# Patient Record
Sex: FEMALE | Race: WHITE | NOT HISPANIC OR LATINO | Employment: UNEMPLOYED | URBAN - METROPOLITAN AREA
[De-identification: names, ages, dates, MRNs, and addresses within clinical notes are randomized per-mention and may not be internally consistent; named-entity substitution may affect disease eponyms.]

---

## 2017-01-26 ENCOUNTER — ALLSCRIPTS OFFICE VISIT (OUTPATIENT)
Dept: OTHER | Facility: OTHER | Age: 52
End: 2017-01-26

## 2017-03-01 ENCOUNTER — GENERIC CONVERSION - ENCOUNTER (OUTPATIENT)
Dept: OTHER | Facility: OTHER | Age: 52
End: 2017-03-01

## 2017-04-20 ENCOUNTER — ALLSCRIPTS OFFICE VISIT (OUTPATIENT)
Dept: OTHER | Facility: OTHER | Age: 52
End: 2017-04-20

## 2017-08-31 ENCOUNTER — ALLSCRIPTS OFFICE VISIT (OUTPATIENT)
Dept: OTHER | Facility: OTHER | Age: 52
End: 2017-08-31

## 2018-01-04 ENCOUNTER — ALLSCRIPTS OFFICE VISIT (OUTPATIENT)
Dept: OTHER | Facility: OTHER | Age: 53
End: 2018-01-04

## 2018-01-09 NOTE — MISCELLANEOUS
Message   Recorded as Task   Date: 03/01/2017 10:55 AM, Created By: Carter Lawson   Task Name: Call Back   Assigned To: Clementine Campuzano   Regarding Patient: Gay Munoz, Status: Active   CommentSerge Candy - 01 Mar 2017 10:55 AM     TASK CREATED  Caller: Self; [phone] (Home)  pls call about restless leg syndrome she changed all her meds to am   Spoke with pt and complains of restless legs despite abilify in am  Will add requip 0 25 mgpo qhs        Plan  Bipolar 2 disorder    · ROPINIRole HCl - 0 25 MG Oral Tablet (Requip); 1 po qhs    Signatures   Electronically signed by : RAUDEL Bowman; Mar  1 2017  5:08PM EST                       (Author)

## 2018-01-11 NOTE — PSYCH
Psych Med Mgmt    Appearance: was calm and cooperative  Observed mood: anxious  Observed mood: affect appropriate  Speech: a normal rate  Thought processes: coherent/organized  Hallucinations: no hallucinations present  Thought Content: no delusions  Abnormal Thoughts: The patient has no suicidal thoughts and no homicidal thoughts  Orientation: The patient is oriented to person, place and time  Recent and Remote Memory: short term memory intact and long term memory intact  Attention Span And Concentration: concentration intact  Insight: Insight intact  Judgment: Her judgment was intact  Muscle Strength And Tone  slow andstaedy gait  Individual Psychotherapy minutes provided today  Goals addressed in session: Discussed residual grief with her step daughter and cocnerns with spouse       Treatment Recommendations: Cymbalta 60 mg po qd  Wellbutrin  mgpo qd  Lunesta 3 mg po qhs  Ativan 1 mg po qid prn  Risks, Benefits And Possible Side Effects Of Medications: Risks, benefits, and possible side effects of medications explained to patient and patient verbalizes understanding  Discussed with patient the risks of sedation, respiratory depression, impairment of ability to drive and potential for abuse and addiction related to treatment with benzodiazepine medications  The patient understands risk of treatment with benzodiazepine medications, agrees to not drive if feels impaired and agrees to take medications as prescribed  She reports normal appetite, normal energy level, no weight change and normal number of sleep hours  Pt seen for follow up Bipolar Disorder  Pt had back surgery fusion November 30, 2016 and states she had "horrible" experience  SHe did not get her depression or anxiety med when she was in [facility]  She went through withdrawal and was physically sick vomiting, etc  States when she got home she restarted her meds   She is off pain meds now  She appears in good spirits  She is back working and will be going back full time next week  She also states the holidays were good and family all go along  She is sleeping fairly well but has noted some restless legs  Question if due to abilify- will change time morning  Her husbands daughter unfortunately committed suicide by overdose November 19  He is struggling with that  Discussed grief counseling and support groups  DSM    Provisional Diagnosis: Bipolar Disorder 2  Assessment    1  Bipolar 2 disorder (974 03) (F31 81)    Plan    1  Eszopiclone 3 MG Oral Tablet; 1 po qhs prn   2  LORazepam 1 MG Oral Tablet; 1 tab po qid prn    Review of Systems    Constitutional: No fever, no chills, feels well, no tiredness, no recent weight gain or loss  Cardiovascular: no complaints of slow or fast heart rate, no chest pain, no palpitations  Respiratory: no complaints of shortness of breath, no wheezing, no dyspnea on exertion  Gastrointestinal: no complaints of abdominal pain, no constipation, no nausea, no diarrhea, no vomiting  Genitourinary: no complaints of dysuria, no incontinence, no pelvic pain, no urinary frequency  Musculoskeletal: back pain  Integumentary: no complaints of skin rash, no itching, no dry skin  Neurological: no complaints of headache, no confusion, no numbness, no dizziness  Active Problems    1  Bipolar 2 disorder (107 89) (F31 81)    Allergies    1  Erythromycin Derivatives    Current Meds   1  ARIPiprazole 5 MG Oral Tablet; 1 TAB PO QD; Therapy: 58Kjb2033 to (Last Rx:07Dec2016)  Requested for: 03GQW7366 Ordered   2  BuPROPion HCl ER (XL) 150 MG Oral Tablet Extended Release 24 Hour; 1 tab po q am;   Therapy: 00Hln7466 to (Last Rx:07Dec2016)  Requested for: 80PTP6148 Ordered   3  DULoxetine HCl - 60 MG Oral Capsule Delayed Release Particles; 1 TAB PO QD; Therapy: 57Toq5550 to (Last Rx:07Dec2016)  Requested for: 20CHX3639 Ordered   4   Eszopiclone 3 MG Oral Tablet; 1 po qhs prn; Therapy: 91Ldj4774 to (Last Rx:07Dec2016) Ordered   5  LORazepam 1 MG Oral Tablet; 1 tab po qid prn; Therapy: 50Bqq3186 to (Last Rx:07Dec2016) Ordered    The medication list was reviewed and updated today  Family Psych History  Mother    1  Family history of depression (V17 0) (Z81 8)    The family history was reviewed and updated today  Social History    · Current every day smoker (305 1) (F17 200)   · Light cigarette smoker (1-9 cigarettes per day) (305 1) (F17 210)   ·    · No alcohol use  The social history was reviewed and updated today  The social history was reviewed and is unchanged  End of Encounter Meds    1  ARIPiprazole 5 MG Oral Tablet (Abilify); 1 TAB PO QD; Therapy: 13Bql4506 to (Last Rx:07Dec2016)  Requested for: 95XMO4899 Ordered   2  BuPROPion HCl ER (XL) 150 MG Oral Tablet Extended Release 24 Hour; 1 tab po q am;   Therapy: 45Soj9480 to (Last Rx:07Dec2016)  Requested for: 39TMA6263 Ordered   3  DULoxetine HCl - 60 MG Oral Capsule Delayed Release Particles; 1 TAB PO QD; Therapy: 72Rzw6039 to (Last Rx:07Dec2016)  Requested for: 84XJM5246 Ordered   4  Eszopiclone 3 MG Oral Tablet; 1 po qhs prn; Therapy: 01Rmn3295 to (Last Rx:26Jan2017) Ordered   5  LORazepam 1 MG Oral Tablet; 1 tab po qid prn;    Therapy: 80Vlm6500 to (Last Rx:26Jan2017) Ordered    Signatures   Electronically signed by : Ceferino Gonzalez, [facility]; Jan 26 2017 10:29AM EST                       (Author)    Electronically signed by : Joseph Shannon MD; Jan 30 2017  4:44PM EST

## 2018-01-11 NOTE — PSYCH
Psych Med Mgmt    Appearance: was calm and cooperative, adequate hygiene and grooming and good eye contact  Observed mood: was dysphoric, but mood appropriate  Observed mood: affect appropriate  Speech: a normal rate  Thought processes: coherent/organized  Hallucinations: no hallucinations present  Thought Content: no delusions  Abnormal Thoughts: The patient has no suicidal thoughts and no homicidal thoughts  Orientation: The patient is oriented to person, place and time  Recent and Remote Memory: short term memory intact and long term memory intact  Attention Span And Concentration: concentration intact  Insight: Insight intact  Judgment: Her judgment was intact  Muscle Strength And Tone  Normal gait and station  Treatment Recommendations: Equip 0 5 mg po qhs  Abilify 5 mg po qd  Wellbutrin  mg po qam  Ativan 1 mg po qid prn  Lunesta 3 mg po qhs prn  Risks, Benefits And Possible Side Effects Of Medications: Risks, benefits, and possible side effects of medications explained to patient and patient verbalizes understanding  She reports normal appetite, normal energy level, no weight change and normal number of sleep hours  Pt seen for follow up Bipolar Disorder  Pt overall has been stable  SHe states her moods are okay  She has increased stressors at work due to a new boss/ new  and states he has been very overbearing  She is looking fro other employment although she has been there for 26 years  She also has stressors at home because her daughter and son-in-law are living there as well as her step-daughter  She has been doing much better with requip and restless legs are stable  She is overall feeling as though she is handling htings  DSM    Provisional Diagnosis: Bipolar Disorder II  Assessment    1  Bipolar 2 disorder (674 97) (F35 30)    Plan    1  ARIPiprazole 5 MG Oral Tablet (Abilify); take 1 tablet by mouth once daily   2   BuPROPion HCl ER (XL) 150 MG Oral Tablet Extended Release 24 Hour; take 1   tablet by mouth every morning   3  DULoxetine HCl - 60 MG Oral Capsule Delayed Release Particles; take 1 tablet   by mouth once daily   4  Eszopiclone 3 MG Oral Tablet; 1 po qhs prn   5  LORazepam 1 MG Oral Tablet; 1 tab po qid prn   6  ROPINIRole HCl - 0 5 MG Oral Tablet; Take 1 tablet by mouth at bedtime    Review of Systems    Constitutional: No fever, no chills, feels well, no tiredness, no recent weight gain or loss  Cardiovascular: no complaints of slow or fast heart rate, no chest pain, no palpitations  Respiratory: no complaints of shortness of breath, no wheezing, no dyspnea on exertion  Gastrointestinal: no complaints of abdominal pain, no constipation, no nausea, no diarrhea, no vomiting  Genitourinary: no complaints of dysuria, no incontinence, no pelvic pain, no urinary frequency  Musculoskeletal: no complaints of arthralgia, no myalgias, no limb pain, no joint stiffness  Integumentary: no complaints of skin rash, no itching, no dry skin  Active Problems    1  Bipolar 2 disorder (296 89) (F31 81)    Allergies    1  Erythromycin Derivatives    Current Meds   1  ARIPiprazole 5 MG Oral Tablet; take 1 tablet by mouth once daily; Therapy: 05Oep9547 to (Evaluate:10Sep2017)  Requested for: 09Brp3871; Last   Rx:12Jul2017 Ordered   2  BuPROPion HCl ER (XL) 150 MG Oral Tablet Extended Release 24 Hour; take 1 tablet by   mouth every morning; Therapy: 17Rox1421 to (Evaluate:08Sep2017)  Requested for: 74Cry2771; Last   Rx:78Sub3376 Ordered   3  DULoxetine HCl - 60 MG Oral Capsule Delayed Release Particles; take 1 tablet by mouth   once daily; Therapy: 89Cwc1912 to (Evaluate:10Sep2017)  Requested for: 06Kmn2850; Last   Rx:34Xcl2019 Ordered   4  Eszopiclone 3 MG Oral Tablet; 1 po qhs prn; Therapy: 75Tvy9038 to (Last Rx:20Apr2017) Ordered   5  LORazepam 1 MG Oral Tablet; 1 tab po qid prn;    Therapy: 28Ifh7961 to (Last Rx:20Apr2017) Ordered   6  ROPINIRole HCl - 0 5 MG Oral Tablet; Take 1 tablet by mouth at bedtime; Therapy: 42KNB9059 to (Evaluate:24Hox4567)  Requested for: 11Aug2017; Last   Rx:11Aug2017 Ordered    The medication list was reviewed and updated today  Family Psych History  Mother    1  Family history of depression (V17 0) (Z81 8)    Social History    · Current every day smoker (305 1) (F17 200)   · Light cigarette smoker (1-9 cigarettes per day) (305 1) (F17 210)   ·    · No alcohol use  The social history was reviewed and is unchanged  End of Encounter Meds    1  ARIPiprazole 5 MG Oral Tablet (Abilify); take 1 tablet by mouth once daily; Therapy: 77Rcu8756 to (Evaluate:94Zji8616)  Requested for: 34Ljn4604; Last   Rx:69Esc5694 Ordered   2  BuPROPion HCl ER (XL) 150 MG Oral Tablet Extended Release 24 Hour; take 1 tablet by   mouth every morning; Therapy: 60Ock2338 to (Evaluate:65Fnd1248)  Requested for: 70Ltl3546; Last   Rx:45Wqp8464 Ordered   3  DULoxetine HCl - 60 MG Oral Capsule Delayed Release Particles; take 1 tablet by mouth   once daily; Therapy: 73Sse8436 to (Evaluate:62Xty1440)  Requested for: 57Pfe3835; Last   Rx:29Jws3301 Ordered   4  Eszopiclone 3 MG Oral Tablet; 1 po qhs prn; Therapy: 85Uyo0893 to (Last Rx:45Uud1260) Ordered   5  LORazepam 1 MG Oral Tablet; 1 tab po qid prn; Therapy: 08Evh1077 to (Last Rx:40Iww8723) Ordered   6  ROPINIRole HCl - 0 5 MG Oral Tablet; Take 1 tablet by mouth at bedtime; Therapy: 44UUU8213 to (Evaluate:71Bhk7581)  Requested for: 96Wit5417; Last   Rx:04Wcp1419 Ordered    Signatures   Electronically signed by : Lura Holstein, [facility];  Aug 31 2017  4:03PM EST                       (Author)    Electronically signed by : Shanelle Dallas MD; Aug 31 2017  4:54PM EST

## 2018-01-12 NOTE — PSYCH
Psych Med Mgmt    Appearance: was calm and cooperative and good eye contact  Observed mood: mood appropriate  Observed mood: affect appropriate  Speech: a normal rate  Thought processes: coherent/organized  Hallucinations: no hallucinations present  Thought Content: no delusions  Abnormal Thoughts: The patient has no suicidal thoughts and no homicidal thoughts  Orientation: The patient is oriented to person, place and time  Recent and Remote Memory: short term memory intact and long term memory intact  Attention Span And Concentration: concentration intact  Insight: Insight intact  Judgment: Her judgment was intact  Muscle Strength And Tone  Normal gait and station  Treatment Recommendations: Increase Requip 0 5 mg po qhs  Ativan 1 mg po qid prn  Cymbalta 60 mg po qd   Wellbutrin  mg po qam   Abilify 5 mg po qam  Lunesta 3 mg po qhs prn  Risks, Benefits And Possible Side Effects Of Medications: Risks, benefits, and possible side effects of medications explained to patient and patient verbalizes understanding  Discussed with patient the risks of sedation, respiratory depression, impairment of ability to drive and potential for abuse and addiction related to treatment with benzodiazepine medications  The patient understands risk of treatment with benzodiazepine medications, agrees to not drive if feels impaired and agrees to take medications as prescribed  She reports normal appetite, normal energy level, no weight change and decrease in number of sleep hours   Pt seen for Bipolar Disorder  Pt states she has not been sleeping well and awakens often during the night  The Lunesta causes her to fall asleep but awakens after 2 hours and then repetitively during the night  She has a good appetite  Her back has been much better since the surgery in November  She reports the restless leg is better with the requip   SHe is not feeling depressed or anxious- meds are helping mood  No panic attacks or crying episodes  No side effects with meds and taking abilify in the morning  States her spouse is doing "okay" and he is seeing someone after his daughters suicide  She states her daughter visited with the 2 grandchildren  She continues with stressors/ difficulties with daughter but enjoyed seeing her grandchildren  Vitals  Signs   Recorded: 20Apr2017 09:12AM   Heart Rate: 71  Systolic: 109  Diastolic: 73  Recorded: 39WWU5543 09:05AM   Respiration: 16  Height: 5 ft 7 in  Weight: 180 lb   BMI Calculated: 28 19  BSA Calculated: 1 93    DSM    Provisional Diagnosis: Bipolar Disorder II  Assessment    1  Bipolar 2 disorder (625 89) (F31 81)    Plan    1  ARIPiprazole 5 MG Oral Tablet (Abilify); 1 TAB PO QD   2  BuPROPion HCl ER (XL) 150 MG Oral Tablet Extended Release 24 Hour; 1 tab   po q am   3  DULoxetine HCl - 60 MG Oral Capsule Delayed Release Particles; 1 TAB PO QD   4  Eszopiclone 3 MG Oral Tablet; 1 po qhs prn   5  LORazepam 1 MG Oral Tablet; 1 tab po qid prn   6  ROPINIRole HCl - 0 5 MG Oral Tablet; 1 po qhs    Review of Systems    Constitutional: feeling tired  Cardiovascular: no complaints of slow or fast heart rate, no chest pain, no palpitations  Respiratory: no complaints of shortness of breath, no wheezing, no dyspnea on exertion  Gastrointestinal: no complaints of abdominal pain, no constipation, no nausea, no diarrhea, no vomiting  Genitourinary: no complaints of dysuria, no incontinence, no pelvic pain, no urinary frequency  Musculoskeletal: no complaints of arthralgia, no myalgias, no limb pain, no joint stiffness  Integumentary: no complaints of skin rash, no itching, no dry skin  Neurological: no complaints of headache, no confusion, no numbness, no dizziness  Active Problems    1  Bipolar 2 disorder (327 89) (F31 81)    Allergies    1  Erythromycin Derivatives    Current Meds   1   ARIPiprazole 5 MG Oral Tablet; 1 TAB PO QD;   Therapy: 77Xbh0584 to (Last Rx:01Mar2017)  Requested for: 07CLX6004 Ordered   2  BuPROPion HCl ER (XL) 150 MG Oral Tablet Extended Release 24 Hour; 1 tab po q am;   Therapy: 58Bii1382 to (Last Rx:07Dec2016)  Requested for: 04EPX0983 Ordered   3  DULoxetine HCl - 60 MG Oral Capsule Delayed Release Particles; 1 TAB PO QD; Therapy: 45Jlw2457 to (Last Rx:07Dec2016)  Requested for: 31YZQ6052 Ordered   4  Eszopiclone 3 MG Oral Tablet; 1 po qhs prn; Therapy: 20Ytu4864 to (Last Rx:26Jan2017) Ordered   5  LORazepam 1 MG Oral Tablet; 1 tab po qid prn; Therapy: 15Gum2923 to (Last Rx:26Jan2017) Ordered   6  ROPINIRole HCl - 0 25 MG Oral Tablet; 1 po qhs;   Therapy: 49WEE5924 to (Last Rx:01Mar2017)  Requested for: 02SOE1764 Ordered    The medication list was reviewed and updated today  Family Psych History  Mother    1  Family history of depression (V17 0) (Z81 8)    Social History    · Current every day smoker (305 1) (F17 200)   · Light cigarette smoker (1-9 cigarettes per day) (305 1) (F17 210)   ·    · No alcohol use  The social history was reviewed and is unchanged  End of Encounter Meds    1  ARIPiprazole 5 MG Oral Tablet (Abilify); 1 TAB PO QD; Therapy: 51Xrs3701 to (Last Rx:20Apr2017)  Requested for: 62Ugr1061 Ordered   2  BuPROPion HCl ER (XL) 150 MG Oral Tablet Extended Release 24 Hour; 1 tab po q am;   Therapy: 13Van7421 to (Last Rx:20Apr2017)  Requested for: 03Nrj0756 Ordered   3  DULoxetine HCl - 60 MG Oral Capsule Delayed Release Particles; 1 TAB PO QD; Therapy: 95Wqb7191 to (Last Rx:20Apr2017)  Requested for: 75Vxo9916 Ordered   4  Eszopiclone 3 MG Oral Tablet; 1 po qhs prn; Therapy: 91Myk0788 to (Last Rx:20Apr2017) Ordered   5  LORazepam 1 MG Oral Tablet; 1 tab po qid prn; Therapy: 88Axb6437 to (Last Rx:20Apr2017) Ordered   6   ROPINIRole HCl - 0 5 MG Oral Tablet; 1 po qhs;   Therapy: 95QCO8445 to (Last Rx:20Apr2017)  Requested for: 20Apr2017 Ordered    Signatures Electronically signed by : Ashlyn Hill [facility];  Apr 20 2017  9:20AM EST                       (Author)    Electronically signed by : Deshawn Chery MD; Apr 24 2017  3:28PM EST

## 2018-01-14 VITALS
SYSTOLIC BLOOD PRESSURE: 108 MMHG | BODY MASS INDEX: 28.25 KG/M2 | DIASTOLIC BLOOD PRESSURE: 73 MMHG | WEIGHT: 180 LBS | RESPIRATION RATE: 16 BRPM | HEART RATE: 71 BPM | HEIGHT: 67 IN

## 2018-01-15 NOTE — PSYCH
Psych Med Mgmt    Appearance: was calm and cooperative  Observed mood: mood appropriate  Observed mood: affect appropriate  Speech: a normal rate  Thought processes: coherent/organized  Hallucinations: no hallucinations present  Thought Content: no delusions  Abnormal Thoughts: The patient has no suicidal thoughts  Orientation: The patient is oriented to person, place and time  Recent and Remote Memory: short term memory intact and long term memory intact  Attention Span And Concentration: concentration intact  Insight: Insight intact  Judgment: Her judgment was intact  Muscle Strength And Tone  Normal gait and station  Treatment Recommendations: Continue Abilify 5 mg po qd  Cymbalta 60 mg po qd  Wellbutrin  mg po qd  Lunesta 3 mg po qhs prn  Ativan 1 mg po qid prn  Risks, Benefits And Possible Side Effects Of Medications: Risks, benefits, and possible side effects of medications explained to patient and patient verbalizes understanding  She reports normal appetite, normal energy level, no weight change and normal number of sleep hours  Pt seems at her baseline  Stable relationship with family- her daughter Jenn Jovel still at home  She is sleeping well and appetite is good  Work has been busy but good  She unfortunately has herniated discs in back and will be having injections- she is seeing ortho next week  Vitals  Signs [Data Includes: Current Encounter]   Recorded: 97EFU5919 03:33PM   Heart Rate: 88  Respiration: 16  Systolic: 189  Diastolic: 68  Recorded: 21KAD7920 03:26PM   Height: 5 ft 7 in  Weight: 186 lb   BMI Calculated: 29 13  BSA Calculated: 1 96    DSM    Provisional Diagnosis: Bipolar 2 Disorder  Assessment    1  Bipolar 2 disorder (296 89) (F31 81)    Plan    1  ARIPiprazole 5 MG Oral Tablet (Abilify); 1 TAB PO QD   2  BuPROPion HCl ER (XL) 150 MG Oral Tablet Extended Release 24 Hour; 1 tab   po q am   3   DULoxetine HCl - 60 MG Oral Capsule Delayed Release Particles; 1 TAB PO QD   4  Eszopiclone 3 MG Oral Tablet; 1 po qhs prn   5  LORazepam 1 MG Oral Tablet; 1 tab po qid prn    Review of Systems    Constitutional: No fever, no chills, feels well, no tiredness, no recent weight gain or loss  Cardiovascular: no complaints of slow or fast heart rate, no chest pain, no palpitations  Respiratory: no complaints of shortness of breath, no wheezing, no dyspnea on exertion  Gastrointestinal: no complaints of abdominal pain, no constipation, no nausea, no diarrhea, no vomiting  Genitourinary: no complaints of dysuria, no incontinence, no pelvic pain, no urinary frequency  Musculoskeletal: back pain- herniated disc  Integumentary: no complaints of skin rash, no itching, no dry skin  Active Problems    1  Bipolar 2 disorder (296 89) (F31 81)    Allergies    1  Erythromycin Derivatives    Current Meds   1  ARIPiprazole 5 MG Oral Tablet; 1 TAB PO QD; Therapy: 04Due0572 to (Last Rx:24Mar2016)  Requested for: 24Mar2016 Ordered   2  BuPROPion HCl ER (XL) 150 MG Oral Tablet Extended Release 24 Hour; 1 tab po q am;   Therapy: 81Clr7943 to (Last Rx:20Nov2015)  Requested for: 20Nov2015 Ordered   3  DULoxetine HCl - 60 MG Oral Capsule Delayed Release Particles; 1 TAB PO QD; Therapy: 24Jck1002 to (Last Rx:24Mar2016)  Requested for: 24Mar2016 Ordered   4  Eszopiclone 3 MG Oral Tablet; 1 po qhs prn; Therapy: 48Tiz3022 to (Last Rx:20Nov2015) Ordered   5  LORazepam 1 MG Oral Tablet; 1 tab po qid prn; Therapy: 90Lew5872 to (Last Rx:20Nov2015) Ordered    The medication list was reviewed and updated today  Family Psych History    1  Family history of depression (V17 0) (Z81 8)    The family history was reviewed and updated today  Social History    · Current every day smoker (305 1) (F17 200)   · Light cigarette smoker (1-9 cigarettes per day) (305 1) (F17 210)   ·    · No alcohol use  The social history was reviewed and updated today   The social history was reviewed and is unchanged  End of Encounter Meds    1  ARIPiprazole 5 MG Oral Tablet (Abilify); 1 TAB PO QD; Therapy: 24Fae5417 to (Last Rx:07Apr2016)  Requested for: 07Apr2016 Ordered   2  BuPROPion HCl ER (XL) 150 MG Oral Tablet Extended Release 24 Hour; 1 tab po q am;   Therapy: 84Fmx5840 to (Last Rx:07Apr2016)  Requested for: 07Apr2016 Ordered   3  DULoxetine HCl - 60 MG Oral Capsule Delayed Release Particles; 1 TAB PO QD; Therapy: 14Xpq5253 to (Last Rx:07Apr2016)  Requested for: 07Apr2016 Ordered   4  Eszopiclone 3 MG Oral Tablet; 1 po qhs prn; Therapy: 62Qis0643 to (Last Rx:07Apr2016) Ordered   5  LORazepam 1 MG Oral Tablet; 1 tab po qid prn; Therapy: 76Ajy5831 to (Last Rx:07Apr2016) Ordered    Signatures   Electronically signed by : Rodrigue Nogueira [facility];  Apr 7 2016  3:36PM EST                       (Author)    Electronically signed by : Consuelo Laird MD; Apr 11 2016  3:56PM EST

## 2018-01-18 NOTE — PSYCH
Psych Med Mgmt    Appearance: was calm and cooperative  Observed mood: was dysphoric  Observed mood: affect was constricted  Speech: a normal rate  Thought processes: coherent/organized  Hallucinations: no hallucinations present  Thought Content: no delusions  Abnormal Thoughts: The patient has no suicidal thoughts and no homicidal thoughts  Orientation: The patient is oriented to person, place and time  Recent and Remote Memory: short term memory intact and long term memory intact  Attention Span And Concentration: concentration intact  Insight: Insight intact  Judgment: Her judgment was intact  Muscle Strength And Tone  Normal gait and station  Treatment Recommendations: Lorazepam 0 5 mg po qid prn  Lunesta 3 mg po qhs prn  Wellbutrin  mgo qam  Abilify 5 mgpo qd  Cymbalta 60 mgpo qd  Risks, Benefits And Possible Side Effects Of Medications: Risks, benefits, and possible side effects of medications explained to patient and patient verbalizes understanding  She reports normal appetite, normal energy level, no weight change and normal number of sleep hours  Pt seen for follow up Bipolar Disorder 2  She has had 2 back surgeries and did not help- she is now having ablation Saturday  She also had mammogram and was told has breast cancer- she had to have a repeat xray and ultrasound before insurance will cover MRI and then biopsy  SHe has MRI on Wednesday  She is frustrated with the insurances and "run around"  She is sleeping well- back feels best when she lays down  Her appetite is fair  Vitals  Signs   Recorded: [id number] 03:55PM   Respiration: 16  Height: 5 ft 7 in  Weight: 211 lb   BMI Calculated: 33 05  BSA Calculated: 2 07    DSM    Provisional Diagnosis: Bipolar Disorder 2  Assessment    1  Bipolar 2 disorder (296 89) (F31 81)    Plan    1  ARIPiprazole 5 MG Oral Tablet (Abilify); 1 TAB PO QD   2   BuPROPion HCl ER (XL) 150 MG Oral Tablet Extended Release 24 Hour; 1 tab   po q am   3  DULoxetine HCl - 60 MG Oral Capsule Delayed Release Particles; 1 TAB PO QD   4  Eszopiclone 3 MG Oral Tablet; 1 po qhs prn   5  LORazepam 1 MG Oral Tablet; 1 tab po qid prn    Review of Systems    Cardiovascular: no complaints of slow or fast heart rate, no chest pain, no palpitations  Respiratory: no complaints of shortness of breath, no wheezing, no dyspnea on exertion  Gastrointestinal: no complaints of abdominal pain, no constipation, no nausea, no diarrhea, no vomiting  Genitourinary: no complaints of dysuria, no incontinence, no pelvic pain, no urinary frequency  Musculoskeletal: no complaints of arthralgia, no myalgias, no limb pain, no joint stiffness  Integumentary: no complaints of skin rash, no itching, no dry skin  Active Problems    1  Bipolar 2 disorder (296 89) (F31 81)    Allergies    1  Erythromycin Derivatives    Current Meds   1  ARIPiprazole 5 MG Oral Tablet; 1 TAB PO QD; Therapy: 62Ryr5586 to (Last Rx:07Apr2016)  Requested for: 49Ibg8020 Ordered   2  BuPROPion HCl ER (XL) 150 MG Oral Tablet Extended Release 24 Hour; 1 tab po q am;   Therapy: 18Cma9386 to (Last Rx:74Obx7898)  Requested for: 06Xad1794 Ordered   3  DULoxetine HCl - 60 MG Oral Capsule Delayed Release Particles; 1 TAB PO QD; Therapy: 66Ykt4791 to (Last Rx:99Ihz6784)  Requested for: 64Ulc4335 Ordered   4  Eszopiclone 3 MG Oral Tablet; 1 po qhs prn; Therapy: 54Sba0850 to (Last Rx:99Wcm1691) Ordered   5  LORazepam 1 MG Oral Tablet; 1 tab po qid prn; Therapy: 01Pit8437 to (Last Rx:67Yly7600) Ordered    The medication list was reviewed and updated today  Family Psych History  Mother    1  Family history of depression (V17 0) (Z81 8)    The family history was reviewed and updated today         Social History    · Current every day smoker (305 1) (F17 200)   · Light cigarette smoker (1-9 cigarettes per day) (305 1) (F17 210)   ·    · No alcohol use  The social history was reviewed and updated today  The social history was reviewed and is unchanged  End of Encounter Meds    1  ARIPiprazole 5 MG Oral Tablet (Abilify); 1 TAB PO QD; Therapy: 82Vlp6161 to (Last Rx:78Bnq7639)  Requested for: 40Zev1420 Ordered   2  BuPROPion HCl ER (XL) 150 MG Oral Tablet Extended Release 24 Hour; 1 tab po q am;   Therapy: 92Fjs4147 to (Last Rx:40Nbv0939)  Requested for: 15Aug2016; Status:   ACTIVE - Transmit to Pharmacy - Awaiting Verification Ordered   3  DULoxetine HCl - 60 MG Oral Capsule Delayed Release Particles; 1 TAB PO QD; Therapy: 49Nno3902 to (Last Rx:65Src5157)  Requested for: 15Aug2016 Ordered   4  Eszopiclone 3 MG Oral Tablet; 1 po qhs prn; Therapy: 78Zoy7859 to (Last Rx:17Eqy2164) Ordered   5  LORazepam 1 MG Oral Tablet; 1 tab po qid prn; Therapy: 68Ygd4042 to (Last Rx:16Ivc6564) Ordered    Signatures   Electronically signed by : Lura Holstein, [facility];  Aug 15 2016  4:01PM EST                       (Author)    Electronically signed by : Shanelle Dallas MD; Aug 15 2016  4:02PM EST

## 2018-01-23 NOTE — PSYCH
Psych Med Mgmt    Appearance: was calm and cooperative, adequate hygiene and grooming and good eye contact  Observed mood: depressed, but mood appropriate  Observed mood: affect appropriate  Speech: a normal rate  Thought processes: coherent/organized  Hallucinations: no hallucinations present  Abnormal Thoughts: The patient has no homicidal thoughts  Orientation: The patient is oriented to person, place and time  Recent and Remote Memory: short term memory intact and long term memory intact  Attention Span And Concentration: concentration intact  Insight: Insight intact  Judgment: Her judgment was intact  Muscle Strength And Tone  Normal gait and station  Treatment Recommendations: Abilify 5 mg po qd  Ativan 1 mg po qid prn  Wellbutrin  qam  Cymbalta 60 mg po qd  Lunesta 3 mg po qd  Risks, Benefits And Possible Side Effects Of Medications: Risks, benefits, and possible side effects of medications explained to patient and patient verbalizes understanding  Discussed with patient Black Box warning on concurrent use of benzodiazepines and opioid medications including sedation, respiratory depression, coma and death  Patient understands the risk of treatment with benzodiazepines in addition to opioids and wants to continue taking those medications  She reports normal appetite, normal energy level, no weight change and normal number of sleep hours  Pt seen for follow up Bipolar Disorder 2  Pt unfortunately lost her job after 26 years due to new management  She has had stressors with that and unable to find a job  SHe is handling things  Overall moods are stable  She is doing well with her meds  No adverse effects with meds  Fair sleep and appetite  Family is doing okay  She had a good holiday and family got along okay  DSM    Provisional Diagnosis: Bipolar Disorder 2  Assessment    1  Bipolar 2 disorder (594 16) (O07 96)    Plan    1  ARIPiprazole 5 MG Oral Tablet (Abilify); take 1 tablet by mouth once daily   2  BuPROPion HCl ER (XL) 150 MG Oral Tablet Extended Release 24 Hour; take 1   tablet by mouth every morning   3  DULoxetine HCl - 60 MG Oral Capsule Delayed Release Particles; take 1 tablet   by mouth once daily   4  Eszopiclone 3 MG Oral Tablet; 1 po qhs prn   5  LORazepam 1 MG Oral Tablet; 1 tab po qid prn   6  ROPINIRole HCl - 0 5 MG Oral Tablet; Take 1 tablet by mouth at bedtime    Review of Systems    Constitutional: No fever, no chills, feels well, no tiredness, no recent weight gain or loss  Cardiovascular: no complaints of slow or fast heart rate, no chest pain, no palpitations  Respiratory: no complaints of shortness of breath, no wheezing, no dyspnea on exertion  Gastrointestinal: no complaints of abdominal pain, no constipation, no nausea, no diarrhea, no vomiting  Genitourinary: no complaints of dysuria, no incontinence, no pelvic pain, no urinary frequency  Musculoskeletal: no complaints of arthralgia, no myalgias, no limb pain, no joint stiffness  Integumentary: no complaints of skin rash, no itching, no dry skin  Active Problems    1  Bipolar 2 disorder (296 89) (F31 81)    Allergies    1  Erythromycin Derivatives    Current Meds   1  ARIPiprazole 5 MG Oral Tablet; take 1 tablet by mouth once daily; Therapy: 10Fse4620 to (Evaluate:53Nos0284)  Requested for: 31Aug2017; Last   Rx:75Pfc5671 Ordered   2  BuPROPion HCl ER (XL) 150 MG Oral Tablet Extended Release 24 Hour; take 1 tablet by   mouth every morning; Therapy: 15Nsv8790 to (Evaluate:47Qpg9725)  Requested for: 40Cmd5163; Last   Rx:81Slw4278 Ordered   3  DULoxetine HCl - 60 MG Oral Capsule Delayed Release Particles; take 1 tablet by mouth   once daily; Therapy: 30Ycr1787 to (Evaluate:07Hkf6397)  Requested for: 43Mrn5521; Last   Rx:30Wts6673 Ordered   4  Eszopiclone 3 MG Oral Tablet; 1 po qhs prn;    Therapy: 34Iwy6908 to (Last Rx:11Ntu2402) Ordered 5  LORazepam 1 MG Oral Tablet; 1 tab po qid prn; Therapy: 92Crp5989 to (Last Rx:85Nqs5710) Ordered   6  ROPINIRole HCl - 0 5 MG Oral Tablet; Take 1 tablet by mouth at bedtime; Therapy: 32CRS1775 to (Evaluate:14Tbz9272)  Requested for: 62Nfv4141; Last   Rx:87Xux7583 Ordered   7  VESIcare 10 MG Oral Tablet; Therapy: ([id number]) to Recorded    The medication list was reviewed and updated today  Family Psych History  Mother    1  Family history of depression (V17 0) (Z81 8)    Social History    · Current every day smoker (305 1) (F17 200)   · Light cigarette smoker (1-9 cigarettes per day) (305 1) (F17 210)   ·    · No alcohol use  The social history was reviewed and is unchanged  End of Encounter Meds    1  ARIPiprazole 5 MG Oral Tablet (Abilify); take 1 tablet by mouth once daily; Therapy: 28Aug2015 to ([employer])  Requested for: 73IOR5542; Last   Rx:04Jan2018 Ordered   2  BuPROPion HCl ER (XL) 150 MG Oral Tablet Extended Release 24 Hour; take 1 tablet by   mouth every morning; Therapy: 28Aug2015 to ([employer])  Requested for: 17NVL5280; Last   Rx:04Jan2018 Ordered   3  DULoxetine HCl - 60 MG Oral Capsule Delayed Release Particles; take 1 tablet by mouth   once daily; Therapy: 28Aug2015 to ([employer])  Requested for: 15ATS6457; Last   Rx:04Jan2018 Ordered   4  Eszopiclone 3 MG Oral Tablet; 1 po qhs prn; Therapy: 22Gqz2067 to (Last Rx:04Jan2018) Ordered   5  LORazepam 1 MG Oral Tablet; 1 tab po qid prn; Therapy: 81Ebf5654 to (Last Rx:04Jan2018) Ordered   6  ROPINIRole HCl - 0 5 MG Oral Tablet; Take 1 tablet by mouth at bedtime; Therapy: 06LZU9940 to ([employer])  Requested for: 52XWQ7163; Last   Rx:04Jan2018 Ordered    7  VESIcare 10 MG Oral Tablet;    Therapy: (KVNG:86BJY4734) to Recorded    Signatures   Electronically signed by : Patricia Franco, [facility]; Jan 4 2018  1:49PM EST                       (Author)    Electronically signed by : Ioana Alas MD; Jan 4 2018  3:24PM EST

## 2018-02-07 RX ORDER — ROPINIROLE 0.25 MG/1
TABLET, FILM COATED ORAL
Qty: 30 TABLET | Refills: 0 | OUTPATIENT
Start: 2018-02-07

## 2018-02-08 ENCOUNTER — TELEPHONE (OUTPATIENT)
Dept: PSYCHIATRY | Facility: CLINIC | Age: 53
End: 2018-02-08

## 2018-02-09 NOTE — TELEPHONE ENCOUNTER
She is no longer on this dose- I have notified [facility] now about this- can you please call [facility] and reinforce to take this off her list of meds? ?  thx

## 2018-02-15 ENCOUNTER — CONSULT (OUTPATIENT)
Dept: UROLOGY | Facility: AMBULATORY SURGERY CENTER | Age: 53
End: 2018-02-15
Payer: COMMERCIAL

## 2018-02-15 VITALS
BODY MASS INDEX: 30.45 KG/M2 | WEIGHT: 194 LBS | HEIGHT: 67 IN | SYSTOLIC BLOOD PRESSURE: 134 MMHG | DIASTOLIC BLOOD PRESSURE: 80 MMHG

## 2018-02-15 DIAGNOSIS — N39.3 STRESS INCONTINENCE: Primary | ICD-10-CM

## 2018-02-15 PROCEDURE — 99244 OFF/OP CNSLTJ NEW/EST MOD 40: CPT | Performed by: UROLOGY

## 2018-02-15 RX ORDER — BUPROPION HYDROCHLORIDE 150 MG/1
1 TABLET ORAL
COMMUNITY
Start: 2015-08-28 | End: 2018-04-26 | Stop reason: SDUPTHER

## 2018-02-15 RX ORDER — LORAZEPAM 1 MG/1
TABLET ORAL
COMMUNITY
Start: 2015-08-28 | End: 2018-04-26 | Stop reason: SDUPTHER

## 2018-02-15 RX ORDER — SOLIFENACIN SUCCINATE 10 MG/1
TABLET, FILM COATED ORAL
COMMUNITY
End: 2018-04-26

## 2018-02-15 RX ORDER — ESZOPICLONE 3 MG/1
TABLET, FILM COATED ORAL
COMMUNITY
Start: 2015-08-28 | End: 2018-04-16

## 2018-02-15 RX ORDER — ROPINIROLE 0.5 MG/1
1 TABLET, FILM COATED ORAL
COMMUNITY
Start: 2017-03-01 | End: 2018-04-26 | Stop reason: SDUPTHER

## 2018-02-15 RX ORDER — ARIPIPRAZOLE 5 MG/1
1 TABLET ORAL DAILY
COMMUNITY
Start: 2015-08-28 | End: 2018-04-26 | Stop reason: SDUPTHER

## 2018-02-15 RX ORDER — DULOXETIN HYDROCHLORIDE 60 MG/1
1 CAPSULE, DELAYED RELEASE ORAL DAILY
COMMUNITY
Start: 2015-08-28 | End: 2018-04-26 | Stop reason: SDUPTHER

## 2018-02-15 NOTE — PROGRESS NOTES
2/15/2018    Miguelina Sabillon Rom  1965  [id number]        Assessment  Mixed incontinence      Discussion  We discussed both her urgency of urination as well as her stress incontinence  It appears that the stress component is the major issue at hand  We discussed evaluation with flexible cystoscopy  Assuming that she demonstrates stress incontinence without significant prolapse we discussed insertion of a mid urethral sling  In the interim I recommend that she continue her VESIcare 10 mg daily  History of Present Illness  45 y o  female with a history of mixed incontinence  Symptoms have been present for at least 2-3 years  She reports 4 vaginal deliveries  She has significant stress incontinence  She is wearing 3-4 depends per day  She is wet every day  She is often wet at night  She has been using VESIcare 10 mg daily which has decreased her nighttime accidents  She requests evaluation for her incontinence  AUA Symptom Score      Review of Systems  Review of Systems   Constitutional: Negative  HENT: Negative  Eyes: Negative  Respiratory: Negative  Cardiovascular: Negative  Gastrointestinal: Negative  Endocrine: Negative  Genitourinary:        Mixed incontinence  3-4 depends per day  Musculoskeletal: Negative  Skin: Negative  Allergic/Immunologic: Negative  Neurological: Negative  Hematological: Negative  Psychiatric/Behavioral: Negative  Past Medical History  No past medical history on file  Past Social History  No past surgical history on file  Past Family History  No family history on file  Past Social history  Social History     Social History    Marital status: /Civil Union     Spouse name: N/A    Number of children: N/A    Years of education: N/A     Occupational History    Not on file       Social History Main Topics    Smoking status: Current Every Day Smoker     Packs/day: 1 00     Types: Cigarettes    Smokeless tobacco: Never Used    Alcohol use Yes      Comment: social     Drug use: No    Sexual activity: Not on file     Other Topics Concern    Not on file     Social History Narrative    No narrative on file       Current Medications  Current Outpatient Prescriptions   Medication Sig Dispense Refill    ARIPiprazole (ABILIFY) 5 mg tablet Take 1 tablet by mouth daily      buPROPion (WELLBUTRIN XL) 150 mg 24 hr tablet Take 1 tablet by mouth      DULoxetine (CYMBALTA) 60 mg delayed release capsule Take 1 tablet by mouth daily      eszopiclone (LUNESTA) tablet Take by mouth      LORazepam (ATIVAN) 1 mg tablet Take by mouth      rOPINIRole (REQUIP) 0 5 mg tablet Take 1 tablet by mouth      solifenacin (VESICARE) 10 MG tablet Take by mouth       No current facility-administered medications for this visit  Allergies  Allergies   Allergen Reactions    Erythromycin        Past Medical History, Social History, Family History, medications and allergies were reviewed  Vitals  Vitals:    02/15/18 0841   BP: 134/80   BP Location: Left arm   Weight: 88 kg (194 lb)   Height: 5' 7" (1 702 m)       Physical Exam  Physical Exam   On examination she is in no acute distress  Her abdomen is soft nontender nondistended   examination reveals no CVA tenderness  Pelvic examination is deferred until the time of cystoscopy  Skin is warm  Both extremities without edema    Neurologic is grossly intact and nonfocal   Gait normal   Affect normal         Results  No results found for: PSA  No results found for: GLUCOSE, CALCIUM, NA, K, CO2, CL, BUN, CREATININE  No results found for: WBC, HGB, HCT, MCV, PLT      Office Urine Dip  No results found for this or any previous visit (from the past 1 hour(s)) ]

## 2018-03-13 ENCOUNTER — PROCEDURE VISIT (OUTPATIENT)
Dept: UROLOGY | Facility: AMBULATORY SURGERY CENTER | Age: 53
End: 2018-03-13
Payer: COMMERCIAL

## 2018-03-13 VITALS
DIASTOLIC BLOOD PRESSURE: 80 MMHG | WEIGHT: 180 LBS | HEART RATE: 78 BPM | BODY MASS INDEX: 28.25 KG/M2 | SYSTOLIC BLOOD PRESSURE: 136 MMHG | HEIGHT: 67 IN

## 2018-03-13 DIAGNOSIS — N39.3 STRESS INCONTINENCE OF URINE: Primary | ICD-10-CM

## 2018-03-13 LAB
SL AMB  POCT GLUCOSE, UA: NORMAL
SL AMB LEUKOCYTE ESTERASE,UA: NORMAL
SL AMB POCT BILIRUBIN,UA: NORMAL
SL AMB POCT BLOOD,UA: NORMAL
SL AMB POCT CLARITY,UA: NORMAL
SL AMB POCT COLOR,UA: YELLOW
SL AMB POCT KETONES,UA: NORMAL
SL AMB POCT NITRITE,UA: NORMAL
SL AMB POCT PH,UA: 5
SL AMB POCT SPECIFIC GRAVITY,UA: 1.02
SL AMB POCT URINE PROTEIN: NORMAL
SL AMB POCT UROBILINOGEN: NORMAL

## 2018-03-13 PROCEDURE — 99213 OFFICE O/P EST LOW 20 MIN: CPT | Performed by: UROLOGY

## 2018-03-13 PROCEDURE — 52000 CYSTOURETHROSCOPY: CPT | Performed by: UROLOGY

## 2018-03-13 PROCEDURE — 81002 URINALYSIS NONAUTO W/O SCOPE: CPT | Performed by: UROLOGY

## 2018-03-13 RX ORDER — SODIUM CHLORIDE 9 MG/ML
125 INJECTION, SOLUTION INTRAVENOUS CONTINUOUS
Status: CANCELLED | OUTPATIENT
Start: 2018-03-13

## 2018-03-13 NOTE — PROGRESS NOTES
[address] is a 38-year-old female who complains of stress as well as urge incontinence  She has been taking VESIcare 10 mg daily which has helped with some of the urgency, however, she still reports market stress incontinence  She states that with a full bladder upon standing or with cough or laughing she will leak significantly  She is wearing multiple pads per day  (up to 3 or 4 depends)  She has had 4 children all through vaginal deliveries  She is status post open unilateral oophorectomy via Pfannenstiel incision  She returns to the office today to undergo flexible cystoscopy to evaluate her bladder, to evaluate for possible prolapse, and to assess for stress incontinence  Cystoscopy Procedure note    Risk and benefits of flexible cystoscopy were discussed  Informed consent was obtained  A urine dip is adequate for cystoscopy  The patient was placed into the modified supine position  Her genitalia was prepped and draped in a sterile fashion  Viscous lidocaine was instilled into the urethra  Flexible cystoscopy was then performed  The bladder was thoroughly inspected  Both ureteral orifices were visualized with clear efflux of urine  The bladder mucosa was thoroughly inspected  There was no evidence of mucosal abnormalities, stones, or lesions  Retroflexion was normal   Overall this was a negative cystoscopy  A female office staff member was present throughout the entire procedure  There was no evidence of prolapse identified  With a full bladder the patient was asked to cough and there was mild leakage  The patient was then asked to stand and with cough there was more significant incontinence appreciated  On examination she is in no acute distress  Lungs are clear  Cardiac is regular  Her abdomen is soft nontender nondistended  A Pfannenstiel scar is noted skin is warm  Extremities without edema    Neurologic is grossly intact and nonfocal   Gait normal   Affect normal     My impression is mixed incontinence with urge and stress components noted  I recommend that she continue the VESIcare 10 mg daily  We discussed Kegel exercises  The patient and I also discussed a possible mid urethral sling  The patient is advocate in for mid urethral sling at this time  Risk and benefits of the procedure were discussed and reviewed informed consent was obtained  The risk of mass insertion was extensively discussed and reviewed  Abisai Manifold

## 2018-03-13 NOTE — H&P
Stephanie Taylor is a 49-year-old female who complains of stress as well as urge incontinence  She has been taking VESIcare 10 mg daily which has helped with some of the urgency, however, she still reports market stress incontinence  She states that with a full bladder upon standing or with cough or laughing she will leak significantly  She is wearing multiple pads per day  (up to 3 or 4 depends)  She has had 4 children all through vaginal deliveries  She is status post open unilateral oophorectomy via Pfannenstiel incision  She returns to the office today to undergo flexible cystoscopy to evaluate her bladder, to evaluate for possible prolapse, and to assess for stress incontinence  Cystoscopy Procedure note    Risk and benefits of flexible cystoscopy were discussed  Informed consent was obtained  A urine dip is adequate for cystoscopy  The patient was placed into the modified supine position  Her genitalia was prepped and draped in a sterile fashion  Viscous lidocaine was instilled into the urethra  Flexible cystoscopy was then performed  The bladder was thoroughly inspected  Both ureteral orifices were visualized with clear efflux of urine  The bladder mucosa was thoroughly inspected  There was no evidence of mucosal abnormalities, stones, or lesions  Retroflexion was normal   Overall this was a negative cystoscopy  A female office staff member was present throughout the entire procedure  There was no evidence of prolapse identified  With a full bladder the patient was asked to cough and there was mild leakage  The patient was then asked to stand and with cough there was more significant incontinence appreciated  On examination she is in no acute distress  Lungs are clear  Cardiac is regular  Her abdomen is soft nontender nondistended  A Pfannenstiel scar is noted skin is warm  Extremities without edema    Neurologic is grossly intact and nonfocal   Gait normal   Affect normal     My impression is mixed incontinence with urge and stress components noted  I recommend that she continue the VESIcare 10 mg daily  We discussed Kegel exercises  The patient and I also discussed a possible mid urethral sling  The patient is advocate in for mid urethral sling at this time  Risk and benefits of the procedure were discussed and reviewed informed consent was obtained  The risk of mass insertion was extensively discussed and reviewed  Christo Antoine

## 2018-03-14 RX ORDER — ROPINIROLE 0.25 MG/1
TABLET, FILM COATED ORAL
Qty: 30 TABLET | Refills: 0 | OUTPATIENT
Start: 2018-03-14

## 2018-03-20 PROBLEM — N39.3 STRESS INCONTINENCE OF URINE: Status: ACTIVE | Noted: 2018-03-20

## 2018-04-04 RX ORDER — ROPINIROLE 0.25 MG/1
TABLET, FILM COATED ORAL
Qty: 30 TABLET | Refills: 0 | OUTPATIENT
Start: 2018-04-04

## 2018-04-04 NOTE — PROGRESS NOTES
4/6/2018  Miguelina Ornelas  1965  [id number]      Assessment  -Mixed urinary incontinence    Discussion  Camila Cifuentes is a 45 y o  female being managed by Dr Corine Andrade  She is scheduled for upcoming surgery for mid urethral sling on 4/24/18  We reviewed the risks and benefits of this procedure including but not limited to cardiopulmonary complications, bleeding, infection, damage to nearby structures such as bladder/ureter/kidney, need for nephrostomy tube, need for additional surgeries  Patient verbalized understanding  Consent will be obtained on the day of procedure by performing surgeon  All questions were answered  History of Present Illness  45 y o  female presents today to discuss her upcoming surgery  Patient is scheduled for mid urethral sling on  4/24/18  Patient last seen by Dr Corine Andrade on 3/13/18  Patient stated that she continued to have significant stress urinary incontinence despite being on VESIcare 10mg daily  She continues to wear up to 4 incontinent briefs/day  Review of Systems  Review of Systems   Constitutional: Negative  HENT: Negative  Respiratory: Negative  Cardiovascular: Negative  Gastrointestinal: Negative  Genitourinary: Positive for urgency  Negative for decreased urine volume, difficulty urinating, dysuria, flank pain, frequency and hematuria  Stress incontinence     Musculoskeletal: Negative  Skin: Negative  Neurological: Negative  Psychiatric/Behavioral: Negative  Past Medical History  No past medical history on file      Surgical History  Past Surgical History:   Procedure Laterality Date    CYSTOSCOPY  03/13/2018    Dr Mesa        Family History  Family History   Problem Relation Age of Onset    Family history unknown: Yes       Social History  Social History     Social History    Marital status: /Civil Union     Spouse name: N/A    Number of children: N/A    Years of education: N/A     Occupational History    Not on file  Social History Main Topics    Smoking status: Current Every Day Smoker     Packs/day: 1 00     Types: Cigarettes    Smokeless tobacco: Never Used    Alcohol use Yes      Comment: social     Drug use: No    Sexual activity: Not on file     Other Topics Concern    Not on file     Social History Narrative    No narrative on file       Current Medications  Current Outpatient Prescriptions   Medication Sig Dispense Refill    ARIPiprazole (ABILIFY) 5 mg tablet Take 1 tablet by mouth daily      buPROPion (WELLBUTRIN XL) 150 mg 24 hr tablet Take 1 tablet by mouth      DULoxetine (CYMBALTA) 60 mg delayed release capsule Take 1 tablet by mouth daily      eszopiclone (LUNESTA) tablet Take by mouth      LORazepam (ATIVAN) 1 mg tablet Take by mouth      rOPINIRole (REQUIP) 0 5 mg tablet Take 1 tablet by mouth      solifenacin (VESICARE) 10 MG tablet Take by mouth       No current facility-administered medications for this visit  Allergies  Allergies   Allergen Reactions    Erythromycin        Vitals  There were no vitals filed for this visit  Physical Exam  Physical Exam   Constitutional: She is oriented to person, place, and time  She appears well-developed and well-nourished  HENT:   Head: Normocephalic  Eyes: Pupils are equal, round, and reactive to light  Neck: Normal range of motion  Cardiovascular: Normal rate, regular rhythm, normal heart sounds and intact distal pulses  Pulmonary/Chest: Effort normal and breath sounds normal    Abdominal: Soft  Normal appearance  There is no CVA tenderness  Musculoskeletal: Normal range of motion  Neurological: She is alert and oriented to person, place, and time  She has normal reflexes  Skin: Skin is warm and dry  Psychiatric: She has a normal mood and affect   Her behavior is normal  Judgment and thought content normal

## 2018-04-06 ENCOUNTER — OFFICE VISIT (OUTPATIENT)
Dept: UROLOGY | Facility: AMBULATORY SURGERY CENTER | Age: 53
End: 2018-04-06
Payer: COMMERCIAL

## 2018-04-06 VITALS
HEART RATE: 76 BPM | BODY MASS INDEX: 30.76 KG/M2 | HEIGHT: 67 IN | WEIGHT: 196 LBS | DIASTOLIC BLOOD PRESSURE: 80 MMHG | SYSTOLIC BLOOD PRESSURE: 136 MMHG

## 2018-04-06 DIAGNOSIS — N39.46 MIXED STRESS AND URGE URINARY INCONTINENCE: Primary | ICD-10-CM

## 2018-04-06 PROCEDURE — 99213 OFFICE O/P EST LOW 20 MIN: CPT | Performed by: NURSE PRACTITIONER

## 2018-04-06 RX ORDER — MELOXICAM 15 MG/1
TABLET ORAL
Refills: 0 | COMMUNITY
Start: 2018-04-02 | End: 2019-01-25

## 2018-04-06 NOTE — LETTER
April 6, 2018     Ankita Dick MD  [address] [address]    Patient: Otto Galeazzi   YOB: 1965   Date of Visit: 4/6/2018       Dear Dr Zach Elias:    Thank you for referring Avis Cade to me for evaluation  Below are my notes for this consultation  If you have questions, please do not hesitate to call me  I look forward to following your patient along with you  Sincerely,        OLEGARIO Caruso        CC: No Recipients  OLEGARIO Caruso  4/6/2018 12:11 PM  Sign at close encounter  4/6/2018  Otto Galeazzi  1965  [id number]      Assessment  -Mixed urinary incontinence    Discussion  Charl Kayser is a 45 y o  female being managed by Dr Zach Elias  She is scheduled for upcoming surgery for mid urethral sling on 4/24/18  We reviewed the risks and benefits of this procedure including but not limited to cardiopulmonary complications, bleeding, infection, damage to nearby structures such as bladder/ureter/kidney, need for nephrostomy tube, need for additional surgeries  Patient verbalized understanding  Consent will be obtained on the day of procedure by performing surgeon  All questions were answered  History of Present Illness  45 y o  female presents today to discuss her upcoming surgery  Patient is scheduled for mid urethral sling on  4/24/18  Patient last seen by Dr Zach Elias on 3/13/18  Patient stated that she continued to have significant stress urinary incontinence despite being on VESIcare 10mg daily  She continues to wear up to 4 incontinent briefs/day  Review of Systems  Review of Systems   Constitutional: Negative  HENT: Negative  Respiratory: Negative  Cardiovascular: Negative  Gastrointestinal: Negative  Genitourinary: Positive for urgency  Negative for decreased urine volume, difficulty urinating, dysuria, flank pain, frequency and hematuria          Stress incontinence     Musculoskeletal: Negative  Skin: Negative  Neurological: Negative  Psychiatric/Behavioral: Negative  Past Medical History  No past medical history on file  Surgical History  Past Surgical History:   Procedure Laterality Date    CYSTOSCOPY  03/13/2018    Dr Mesa        Family History  Family History   Problem Relation Age of Onset    Family history unknown: Yes       Social History  Social History     Social History    Marital status: /Civil Union     Spouse name: N/A    Number of children: N/A    Years of education: N/A     Occupational History    Not on file  Social History Main Topics    Smoking status: Current Every Day Smoker     Packs/day: 1 00     Types: Cigarettes    Smokeless tobacco: Never Used    Alcohol use Yes      Comment: social     Drug use: No    Sexual activity: Not on file     Other Topics Concern    Not on file     Social History Narrative    No narrative on file       Current Medications  Current Outpatient Prescriptions   Medication Sig Dispense Refill    ARIPiprazole (ABILIFY) 5 mg tablet Take 1 tablet by mouth daily      buPROPion (WELLBUTRIN XL) 150 mg 24 hr tablet Take 1 tablet by mouth      DULoxetine (CYMBALTA) 60 mg delayed release capsule Take 1 tablet by mouth daily      eszopiclone (LUNESTA) tablet Take by mouth      LORazepam (ATIVAN) 1 mg tablet Take by mouth      rOPINIRole (REQUIP) 0 5 mg tablet Take 1 tablet by mouth      solifenacin (VESICARE) 10 MG tablet Take by mouth       No current facility-administered medications for this visit  Allergies  Allergies   Allergen Reactions    Erythromycin        Vitals  There were no vitals filed for this visit  Physical Exam  Physical Exam   Constitutional: She is oriented to person, place, and time  She appears well-developed and well-nourished  HENT:   Head: Normocephalic  Eyes: Pupils are equal, round, and reactive to light  Neck: Normal range of motion     Cardiovascular: Normal rate, regular rhythm, normal heart sounds and intact distal pulses  Pulmonary/Chest: Effort normal and breath sounds normal    Abdominal: Soft  Normal appearance  There is no CVA tenderness  Musculoskeletal: Normal range of motion  Neurological: She is alert and oriented to person, place, and time  She has normal reflexes  Skin: Skin is warm and dry  Psychiatric: She has a normal mood and affect   Her behavior is normal  Judgment and thought content normal

## 2018-04-09 RX ORDER — ARIPIPRAZOLE 5 MG/1
TABLET ORAL DAILY
Qty: 30 TABLET | Refills: 3 | OUTPATIENT
Start: 2018-04-09

## 2018-04-09 RX ORDER — DULOXETIN HYDROCHLORIDE 60 MG/1
CAPSULE, DELAYED RELEASE ORAL DAILY
Qty: 30 CAPSULE | Refills: 3 | OUTPATIENT
Start: 2018-04-09

## 2018-04-09 RX ORDER — BUPROPION HYDROCHLORIDE 150 MG/1
TABLET ORAL
Qty: 30 TABLET | Refills: 3 | OUTPATIENT
Start: 2018-04-09

## 2018-04-16 NOTE — PRE-PROCEDURE INSTRUCTIONS
Pre-Surgery Instructions:   Medication Instructions    ARIPiprazole (ABILIFY) 5 mg tablet Instructed patient per Anesthesia Guidelines   buPROPion (WELLBUTRIN XL) 150 mg 24 hr tablet Instructed patient per Anesthesia Guidelines   DULoxetine (CYMBALTA) 60 mg delayed release capsule Instructed patient per Anesthesia Guidelines   LORazepam (ATIVAN) 1 mg tablet Instructed patient per Anesthesia Guidelines   meloxicam (MOBIC) 15 mg tablet Instructed patient per Anesthesia Guidelines   rOPINIRole (REQUIP) 0 5 mg tablet Instructed patient per Anesthesia Guidelines   solifenacin (VESICARE) 10 MG tablet Instructed patient per Anesthesia Guidelines  REVIEWED  PRINTED SURGICAL INSTRUCTIONS WITH PATIENT , PATIENT VERBALIZED UNDERSTANDING   MEDICATIONS REVIEWED

## 2018-04-17 ENCOUNTER — APPOINTMENT (OUTPATIENT)
Dept: LAB | Facility: HOSPITAL | Age: 53
End: 2018-04-17
Attending: UROLOGY
Payer: COMMERCIAL

## 2018-04-17 ENCOUNTER — OFFICE VISIT (OUTPATIENT)
Dept: LAB | Facility: HOSPITAL | Age: 53
End: 2018-04-17
Attending: UROLOGY
Payer: COMMERCIAL

## 2018-04-17 DIAGNOSIS — N39.3 FEMALE STRESS INCONTINENCE: ICD-10-CM

## 2018-04-17 LAB
ANION GAP SERPL CALCULATED.3IONS-SCNC: 8 MMOL/L (ref 4–13)
BASOPHILS # BLD AUTO: 0.07 THOUSANDS/ΜL (ref 0–0.1)
BASOPHILS NFR BLD AUTO: 1 % (ref 0–1)
BUN SERPL-MCNC: 16 MG/DL (ref 5–25)
CALCIUM SERPL-MCNC: 9.4 MG/DL (ref 8.3–10.1)
CHLORIDE SERPL-SCNC: 110 MMOL/L (ref 100–108)
CO2 SERPL-SCNC: 25 MMOL/L (ref 21–32)
CREAT SERPL-MCNC: 0.96 MG/DL (ref 0.6–1.3)
EOSINOPHIL # BLD AUTO: 0.23 THOUSAND/ΜL (ref 0–0.61)
EOSINOPHIL NFR BLD AUTO: 3 % (ref 0–6)
ERYTHROCYTE [DISTWIDTH] IN BLOOD BY AUTOMATED COUNT: 14 % (ref 11.6–15.1)
GFR SERPL CREATININE-BSD FRML MDRD: 67 ML/MIN/1.73SQ M
GLUCOSE P FAST SERPL-MCNC: 99 MG/DL (ref 65–99)
HCT VFR BLD AUTO: 46.7 % (ref 34.8–46.1)
HGB BLD-MCNC: 15.3 G/DL (ref 11.5–15.4)
LYMPHOCYTES # BLD AUTO: 2.24 THOUSANDS/ΜL (ref 0.6–4.47)
LYMPHOCYTES NFR BLD AUTO: 26 % (ref 14–44)
MCH RBC QN AUTO: 33.2 PG (ref 26.8–34.3)
MCHC RBC AUTO-ENTMCNC: 32.8 G/DL (ref 31.4–37.4)
MCV RBC AUTO: 101 FL (ref 82–98)
MONOCYTES # BLD AUTO: 0.53 THOUSAND/ΜL (ref 0.17–1.22)
MONOCYTES NFR BLD AUTO: 6 % (ref 4–12)
NEUTROPHILS # BLD AUTO: 5.38 THOUSANDS/ΜL (ref 1.85–7.62)
NEUTS SEG NFR BLD AUTO: 64 % (ref 43–75)
NRBC BLD AUTO-RTO: 0 /100 WBCS
PLATELET # BLD AUTO: 216 THOUSANDS/UL (ref 149–390)
PMV BLD AUTO: 11.7 FL (ref 8.9–12.7)
POTASSIUM SERPL-SCNC: 3.8 MMOL/L (ref 3.5–5.3)
RBC # BLD AUTO: 4.61 MILLION/UL (ref 3.81–5.12)
SODIUM SERPL-SCNC: 143 MMOL/L (ref 136–145)
WBC # BLD AUTO: 8.48 THOUSAND/UL (ref 4.31–10.16)

## 2018-04-17 PROCEDURE — 80048 BASIC METABOLIC PNL TOTAL CA: CPT

## 2018-04-17 PROCEDURE — 85025 COMPLETE CBC W/AUTO DIFF WBC: CPT

## 2018-04-17 PROCEDURE — 36415 COLL VENOUS BLD VENIPUNCTURE: CPT

## 2018-04-17 PROCEDURE — 93005 ELECTROCARDIOGRAM TRACING: CPT

## 2018-04-18 LAB
ATRIAL RATE: 73 BPM
P AXIS: 25 DEGREES
PR INTERVAL: 128 MS
QRS AXIS: 25 DEGREES
QRSD INTERVAL: 76 MS
QT INTERVAL: 390 MS
QTC INTERVAL: 429 MS
T WAVE AXIS: 54 DEGREES
VENTRICULAR RATE: 73 BPM

## 2018-04-18 PROCEDURE — 93010 ELECTROCARDIOGRAM REPORT: CPT | Performed by: INTERNAL MEDICINE

## 2018-04-23 ENCOUNTER — ANESTHESIA EVENT (OUTPATIENT)
Dept: PERIOP | Facility: HOSPITAL | Age: 53
End: 2018-04-23
Payer: COMMERCIAL

## 2018-04-24 ENCOUNTER — HOSPITAL ENCOUNTER (OUTPATIENT)
Facility: HOSPITAL | Age: 53
Setting detail: OUTPATIENT SURGERY
Discharge: HOME | End: 2018-04-24
Attending: UROLOGY | Admitting: UROLOGY
Payer: COMMERCIAL

## 2018-04-24 ENCOUNTER — ANESTHESIA (OUTPATIENT)
Dept: PERIOP | Facility: HOSPITAL | Age: 53
End: 2018-04-24
Payer: COMMERCIAL

## 2018-04-24 VITALS
RESPIRATION RATE: 16 BRPM | SYSTOLIC BLOOD PRESSURE: 100 MMHG | TEMPERATURE: 97.6 F | HEART RATE: 82 BPM | DIASTOLIC BLOOD PRESSURE: 74 MMHG | OXYGEN SATURATION: 98 %

## 2018-04-24 DIAGNOSIS — N39.3 STRESS INCONTINENCE OF URINE: Primary | ICD-10-CM

## 2018-04-24 PROCEDURE — 57288 REPAIR BLADDER DEFECT: CPT | Performed by: UROLOGY

## 2018-04-24 PROCEDURE — C1771 REP DEV, URINARY, W/SLING: HCPCS | Performed by: UROLOGY

## 2018-04-24 DEVICE — SLING SYS MID URETHRAL HALO OBTRYX II: Type: IMPLANTABLE DEVICE | Site: VAGINA | Status: FUNCTIONAL

## 2018-04-24 RX ORDER — HYDROCODONE BITARTRATE AND ACETAMINOPHEN 5; 325 MG/1; MG/1
2 TABLET ORAL EVERY 4 HOURS PRN
Status: DISCONTINUED | OUTPATIENT
Start: 2018-04-24 | End: 2018-04-24 | Stop reason: HOSPADM

## 2018-04-24 RX ORDER — FENTANYL CITRATE/PF 50 MCG/ML
25 SYRINGE (ML) INJECTION
Status: DISCONTINUED | OUTPATIENT
Start: 2018-04-24 | End: 2018-04-24 | Stop reason: HOSPADM

## 2018-04-24 RX ORDER — MIDAZOLAM HYDROCHLORIDE 1 MG/ML
INJECTION INTRAMUSCULAR; INTRAVENOUS AS NEEDED
Status: DISCONTINUED | OUTPATIENT
Start: 2018-04-24 | End: 2018-04-24 | Stop reason: SURG

## 2018-04-24 RX ORDER — HYDROCODONE BITARTRATE AND ACETAMINOPHEN 5; 325 MG/1; MG/1
2 TABLET ORAL EVERY 6 HOURS PRN
Qty: 15 TABLET | Refills: 0 | Status: SHIPPED | OUTPATIENT
Start: 2018-04-24 | End: 2018-04-26

## 2018-04-24 RX ORDER — ONDANSETRON 2 MG/ML
4 INJECTION INTRAMUSCULAR; INTRAVENOUS ONCE AS NEEDED
Status: DISCONTINUED | OUTPATIENT
Start: 2018-04-24 | End: 2018-04-24 | Stop reason: HOSPADM

## 2018-04-24 RX ORDER — FENTANYL CITRATE 50 UG/ML
INJECTION, SOLUTION INTRAMUSCULAR; INTRAVENOUS AS NEEDED
Status: DISCONTINUED | OUTPATIENT
Start: 2018-04-24 | End: 2018-04-24 | Stop reason: SURG

## 2018-04-24 RX ORDER — GINSENG 100 MG
CAPSULE ORAL AS NEEDED
Status: DISCONTINUED | OUTPATIENT
Start: 2018-04-24 | End: 2018-04-24 | Stop reason: HOSPADM

## 2018-04-24 RX ORDER — SODIUM CHLORIDE 9 MG/ML
125 INJECTION, SOLUTION INTRAVENOUS CONTINUOUS
Status: DISCONTINUED | OUTPATIENT
Start: 2018-04-24 | End: 2018-04-24 | Stop reason: HOSPADM

## 2018-04-24 RX ORDER — SODIUM CHLORIDE, SODIUM LACTATE, POTASSIUM CHLORIDE, CALCIUM CHLORIDE 600; 310; 30; 20 MG/100ML; MG/100ML; MG/100ML; MG/100ML
125 INJECTION, SOLUTION INTRAVENOUS CONTINUOUS
Status: DISCONTINUED | OUTPATIENT
Start: 2018-04-24 | End: 2018-04-24 | Stop reason: HOSPADM

## 2018-04-24 RX ORDER — ONDANSETRON 2 MG/ML
INJECTION INTRAMUSCULAR; INTRAVENOUS AS NEEDED
Status: DISCONTINUED | OUTPATIENT
Start: 2018-04-24 | End: 2018-04-24 | Stop reason: SURG

## 2018-04-24 RX ORDER — PROPOFOL 10 MG/ML
INJECTION, EMULSION INTRAVENOUS AS NEEDED
Status: DISCONTINUED | OUTPATIENT
Start: 2018-04-24 | End: 2018-04-24 | Stop reason: SURG

## 2018-04-24 RX ORDER — KETOROLAC TROMETHAMINE 30 MG/ML
INJECTION, SOLUTION INTRAMUSCULAR; INTRAVENOUS AS NEEDED
Status: DISCONTINUED | OUTPATIENT
Start: 2018-04-24 | End: 2018-04-24 | Stop reason: SURG

## 2018-04-24 RX ORDER — BUPIVACAINE HYDROCHLORIDE AND EPINEPHRINE 2.5; 5 MG/ML; UG/ML
INJECTION, SOLUTION INFILTRATION; PERINEURAL AS NEEDED
Status: DISCONTINUED | OUTPATIENT
Start: 2018-04-24 | End: 2018-04-24 | Stop reason: HOSPADM

## 2018-04-24 RX ORDER — METOCLOPRAMIDE HYDROCHLORIDE 5 MG/ML
10 INJECTION INTRAMUSCULAR; INTRAVENOUS ONCE AS NEEDED
Status: DISCONTINUED | OUTPATIENT
Start: 2018-04-24 | End: 2018-04-24 | Stop reason: HOSPADM

## 2018-04-24 RX ORDER — ALBUTEROL SULFATE 2.5 MG/3ML
2.5 SOLUTION RESPIRATORY (INHALATION) AS NEEDED
Status: DISCONTINUED | OUTPATIENT
Start: 2018-04-24 | End: 2018-04-24 | Stop reason: HOSPADM

## 2018-04-24 RX ORDER — MIDAZOLAM HYDROCHLORIDE 1 MG/ML
INJECTION INTRAMUSCULAR; INTRAVENOUS
Status: COMPLETED
Start: 2018-04-24 | End: 2018-04-24

## 2018-04-24 RX ORDER — MAGNESIUM HYDROXIDE 1200 MG/15ML
LIQUID ORAL AS NEEDED
Status: DISCONTINUED | OUTPATIENT
Start: 2018-04-24 | End: 2018-04-24 | Stop reason: HOSPADM

## 2018-04-24 RX ADMIN — KETOROLAC TROMETHAMINE 30 MG: 30 INJECTION, SOLUTION INTRAMUSCULAR at 13:25

## 2018-04-24 RX ADMIN — PROPOFOL 200 MG: 10 INJECTION, EMULSION INTRAVENOUS at 12:47

## 2018-04-24 RX ADMIN — MIDAZOLAM 2 MG: 1 INJECTION INTRAMUSCULAR; INTRAVENOUS at 12:41

## 2018-04-24 RX ADMIN — LIDOCAINE HYDROCHLORIDE 100 MG: 20 INJECTION, SOLUTION INTRAVENOUS at 12:47

## 2018-04-24 RX ADMIN — SODIUM CHLORIDE, SODIUM LACTATE, POTASSIUM CHLORIDE, AND CALCIUM CHLORIDE 125 ML/HR: .6; .31; .03; .02 INJECTION, SOLUTION INTRAVENOUS at 11:21

## 2018-04-24 RX ADMIN — DEXAMETHASONE SODIUM PHOSPHATE 4 MG: 10 INJECTION INTRAMUSCULAR; INTRAVENOUS at 12:53

## 2018-04-24 RX ADMIN — FENTANYL CITRATE 25 MCG: 50 INJECTION, SOLUTION INTRAMUSCULAR; INTRAVENOUS at 14:15

## 2018-04-24 RX ADMIN — ONDANSETRON 4 MG: 2 INJECTION INTRAMUSCULAR; INTRAVENOUS at 12:53

## 2018-04-24 RX ADMIN — FENTANYL CITRATE 50 MCG: 50 INJECTION, SOLUTION INTRAMUSCULAR; INTRAVENOUS at 12:56

## 2018-04-24 RX ADMIN — Medication 2000 MG: at 12:43

## 2018-04-24 NOTE — DISCHARGE INSTRUCTIONS
Today you underwent insertion of a mid urethral sling  Make sure to remove the vaginal packing on the morning of Wednesday April 25, 2018  Call immediately if he were unable to urinate  Avoid strenuous activity or exercise for the next 3-4 weeks until the sling and incisions have healed  Call for follow-up with Dr William Fernandes in the next 3 weeks [phone]    Bladder Sling Procedure   WHAT YOU NEED TO KNOW:   A bladder sling procedure is surgery to treat urinary incontinence in women  The sling acts as a hammock to keep your urethra in place and hold it closed when your bladder is full  You may have vaginal bleeding or discharge for up to a week after your surgery  Use sanitary pads  Do not use tampons  You may have some pelvic discomfort or trouble urinating  DISCHARGE INSTRUCTIONS:   Call 911 for any of the following:   · You have sudden trouble breathing  Seek care immediately if:   · Your bleeding gets worse  · You have yellow or foul smelling discharge from your vagina  · You cannot urinate, or you are urinating less than what is normal for you  · You feel confused  Contact your healthcare provider if:   · You have a fever  · You do not feel like you are able to empty your bladder completely when you urinate  · You feel the need to urinate very suddenly  · You have burning or stinging when you urinate  · You have blood in your urine  · Your skin is itchy, swollen, or you have a rash  · You have questions or concerns about your condition or care  Medicines:   · Prescription pain medicine  may be given  Ask your how to take this medicine safely  · Take your medicine as directed  Contact your healthcare provider if you think your medicine is not helping or if you have side effects  Tell him or her if you are allergic to any medicine  Keep a list of the medicines, vitamins, and herbs you take  Include the amounts, and when and why you take them   Bring the list or the pill bottles to follow-up visits  Carry your medicine list with you in case of an emergency  Self-catheterization:  You may need to put a catheter into your bladder after you urinate to empty any remaining urine  A catheter is a small rubber tube used to drain urine  Healthcare providers will teach you how to put the catheter in safely  This may be needed until you are completely emptying your bladder  Nichole catheter: You may have a Nichole catheter for a short period of time  The Nichole is a tube put into your bladder to drain urine into a bag  Keep the bag below your waist  This will prevent urine from flowing back into your bladder and causing an infection or other problems  Also, keep the tube free of kinks so the urine will drain properly  Do not pull on the catheter  This can cause pain and bleeding, and may cause the catheter to come out  Activity:  Do not lift heavy objects for 6 weeks after your procedure  Do not have intercourse for 4 to 6 weeks  Do not use a tampon for 4 weeks  Ask your healthcare provider when you can return to work or your usual activities  Do pelvic muscle exercises: These are also called Kegel exercises  These exercises help strengthen your pelvic muscles and help prevent urine leakage  Tighten the muscles of your pelvis and hold them tight for 5 seconds, then relax for 5 seconds  Gradually work up to tightening them for 10 seconds and relaxing for 10 seconds  Do this 3 times each day  Keep a record:  Keep a record of when you urinate and if you leak any urine  Write down what you were doing when you leaked urine, such as coughing or sneezing  Bring the log to your follow-up visits  Prevent constipation:  Drink liquids as directed  You may need to drink more water than usual to soften your bowel movements  Eat a variety of healthy foods, especially fruit and foods high in fiber  You may need to use an over-the-counter bowel movement softener    Follow up with your healthcare provider as directed: You may need a test to check how much urine remains in your bladder after you urinate  This will help show how the sling is working  Write down your questions so you remember to ask them during your visits  © 2017 [employer] Information is for End User's use only and may not be sold, redistributed or otherwise used for commercial purposes  All illustrations and images included in CareNotes® are the copyrighted property of [employer]  or [employer]  The above information is an  only  It is not intended as medical advice for individual conditions or treatments  Talk to your doctor, nurse or pharmacist before following any medical regimen to see if it is safe and effective for you

## 2018-04-24 NOTE — ANESTHESIA PREPROCEDURE EVALUATION
Review of Systems/Medical History  Patient summary reviewed  Chart reviewed  No history of anesthetic complications     Cardiovascular  Negative cardio ROS Exercise tolerance: good,     Pulmonary  Smoker cigarette smoker  ,        GI/Hepatic  Negative GI/hepatic ROS   No GERD ,          Comment: Incontinence, for sling     Endo/Other  Negative endo/other ROS      GYN  Negative gynecology ROS          Hematology  Negative hematology ROS      Musculoskeletal  Negative musculoskeletal ROS        Neurology  Negative neurology ROS      Psychology   Anxiety, Depression ,              Physical Exam    Airway  Comment: Small mouth  Mallampati score: III  TM Distance: >3 FB  Neck ROM: full     Dental   No notable dental hx     Cardiovascular  Comment: Negative ROS,     Pulmonary      Other Findings      Lab Results   Component Value Date    WBC 8 48 04/17/2018    HGB 15 3 04/17/2018     04/17/2018     Lab Results   Component Value Date     04/17/2018    K 3 8 04/17/2018    BUN 16 04/17/2018    CREATININE 0 96 04/17/2018     Anesthesia Plan  ASA Score- 2     Anesthesia Type- general with ASA Monitors  Additional Monitors:   Airway Plan: LMA  Plan Factors-    Induction- intravenous  Postoperative Plan-     Informed Consent- Anesthetic plan and risks discussed with patient and spouse  I personally reviewed this patient with the CRNA  Discussed and agreed on the Anesthesia Plan with the CRNA  Domitila Goss

## 2018-04-24 NOTE — OP NOTE
OPERATIVE REPORT  PATIENT NAME: Galileo Morris    :  1965  MRN: [id number]  Pt Location: [facility] OR ROOM 05    SURGERY DATE: 2018    Surgeon(s) and Role:     * Wm Junior MD - Primary    Preop Diagnosis:  Stress incontinence of urine [N39 3]    Post-Op Diagnosis Codes:     * Stress incontinence of urine [N39 3]    Procedure(s) (LRB):  INSERTION PUBOVAGINAL SLING (FEMALE) Trans obturator mid urethral sling insertion, CYSTOSCOPY (N/A)    Specimen(s):  * No specimens in log *    Estimated Blood Loss:   Minimal    Drains:  None        Anesthesia Type:   General    Operative Indications:  Stress incontinence of urine [N39 3]      Operative Findings:  Standard trans obturator mid urethral sling insertion  Cystoscopy after trocar placement normal     Complications:   None    Procedure and Technique:   Mitchell Angeles is a 18-year-old female with primary stress urinary incontinence  She is wearing at least 3 pads per day  She leaks daily with stress maneuvers  Evaluation in the office confirmed stress incontinence with a full bladder  Risk and benefits of trans obturator mid urethral sling insertion were discussed and reviewed  Risk of mesh implantation was discussed and reviewed  Risk included, but are not limited to, mesh erosion, persistent incontinence, urinary retention, need for revision, painful intercourse  The patient was brought to the operating room 2018  After the smooth induction of general LMA anesthesia, the patient was placed in the dorsal lithotomy position  Venodyne boots were applied  Intravenous antibiotics were administered  A time-out was performed with all members of the operative team confirming the patient's identity and procedure to be performed  Her entire genitalia was prepped and draped in sterile fashion  A Nichole catheter was inserted in the bladder was drained  The Nichole catheter was then clamped  Labial stay sutures were placed   0 25% Marcaine with epinephrine was utilized to anesthetize the anterior vaginal wall  A sub 2 cm midline anterior vaginal wall incision was then made approximately 1 cm proximal to the urethral meatus  Flaps of vaginal mucosa were developed with Metzenbaum scissors  Dissection was performed to the level of the endopelvic fascia but the endopelvic fascia was not violated  A gloved finger passed easily into the incision on either side  Two small stab incisions were then made over the medial border of the obturator fossa at the superior aspect of the vaginal opening  Bilateral trans obturator [employer] trocars were then passed in an outside in fashion over a gloved finger as the Nichole catheter remained in place in the urethra was deviated with the gloved index finger in the incision  Once both trocars were passed cystoscopy was performed with a rigid scope with a 70 degree lens  There was no hematuria  Bladder mucosa was normal   The obturators were not visualized  There was no evidence of bladder perforation  Nichole catheter was reinserted  The mesh sling was then loaded into position the of the trocars  The sling was properly centered and tension was placed just to allow easy passage of Metzenbaum scissors between the urethra and the sling  The sling was then cut just below the level of the skin on either stab incision in the groin  All incisions were irrigated  The vaginal incision was closed with running 2 0 Vicryl  The 2 small skin incisions were closed with subcuticular 4 0 Monocryl and histacryl  The labial stay sutures were removed  The area was washed and dried  The Nichole catheter was removed  Vaginal packing with bacitracin ointment was placed      Patient Disposition:  PACU stable and extubated    SIGNATURE: Kalpesh Lomas MD  DATE: April 24, 2018  TIME: 2:06 PM

## 2018-04-24 NOTE — ANESTHESIA POSTPROCEDURE EVALUATION
Post-Op Assessment Note      CV Status:  Stable    Mental Status:  Alert and awake    Hydration Status:  Euvolemic    PONV Controlled:  Controlled    Airway Patency:  Patent    Post Op Vitals Reviewed: Yes          Staff: Anesthesiologist, CRNA           BP   145/63   Temp   97 3   Pulse  97   Resp   12   SpO2   95

## 2018-04-24 NOTE — INTERVAL H&P NOTE
H&P reviewed  After examining the patient I find no changes in the patients condition since the H&P had been written   + SHAHIDA  For transobturator mid urethral sling

## 2018-04-24 NOTE — H&P (VIEW-ONLY)
4/6/2018  Miguelina Wesley Signs  1965  [id number]      Assessment  -Mixed urinary incontinence    Discussion  Elvis Rome is a 45 y o  female being managed by Dr Gregorio Dawson  She is scheduled for upcoming surgery for mid urethral sling on 4/24/18  We reviewed the risks and benefits of this procedure including but not limited to cardiopulmonary complications, bleeding, infection, damage to nearby structures such as bladder/ureter/kidney, need for nephrostomy tube, need for additional surgeries  Patient verbalized understanding  Consent will be obtained on the day of procedure by performing surgeon  All questions were answered  History of Present Illness  45 y o  female presents today to discuss her upcoming surgery  Patient is scheduled for mid urethral sling on  4/24/18  Patient last seen by Dr Gregorio Dawson on 3/13/18  Patient stated that she continued to have significant stress urinary incontinence despite being on VESIcare 10mg daily  She continues to wear up to 4 incontinent briefs/day  Review of Systems  Review of Systems   Constitutional: Negative  HENT: Negative  Respiratory: Negative  Cardiovascular: Negative  Gastrointestinal: Negative  Genitourinary: Positive for urgency  Negative for decreased urine volume, difficulty urinating, dysuria, flank pain, frequency and hematuria  Stress incontinence     Musculoskeletal: Negative  Skin: Negative  Neurological: Negative  Psychiatric/Behavioral: Negative  Past Medical History  No past medical history on file      Surgical History  Past Surgical History:   Procedure Laterality Date    CYSTOSCOPY  03/13/2018    Dr Mesa        Family History  Family History   Problem Relation Age of Onset    Family history unknown: Yes       Social History  Social History     Social History    Marital status: /Civil Union     Spouse name: N/A    Number of children: N/A    Years of education: N/A     Occupational History    Not on file  Social History Main Topics    Smoking status: Current Every Day Smoker     Packs/day: 1 00     Types: Cigarettes    Smokeless tobacco: Never Used    Alcohol use Yes      Comment: social     Drug use: No    Sexual activity: Not on file     Other Topics Concern    Not on file     Social History Narrative    No narrative on file       Current Medications  Current Outpatient Prescriptions   Medication Sig Dispense Refill    ARIPiprazole (ABILIFY) 5 mg tablet Take 1 tablet by mouth daily      buPROPion (WELLBUTRIN XL) 150 mg 24 hr tablet Take 1 tablet by mouth      DULoxetine (CYMBALTA) 60 mg delayed release capsule Take 1 tablet by mouth daily      eszopiclone (LUNESTA) tablet Take by mouth      LORazepam (ATIVAN) 1 mg tablet Take by mouth      rOPINIRole (REQUIP) 0 5 mg tablet Take 1 tablet by mouth      solifenacin (VESICARE) 10 MG tablet Take by mouth       No current facility-administered medications for this visit  Allergies  Allergies   Allergen Reactions    Erythromycin        Vitals  There were no vitals filed for this visit  Physical Exam  Physical Exam   Constitutional: She is oriented to person, place, and time  She appears well-developed and well-nourished  HENT:   Head: Normocephalic  Eyes: Pupils are equal, round, and reactive to light  Neck: Normal range of motion  Cardiovascular: Normal rate, regular rhythm, normal heart sounds and intact distal pulses  Pulmonary/Chest: Effort normal and breath sounds normal    Abdominal: Soft  Normal appearance  There is no CVA tenderness  Musculoskeletal: Normal range of motion  Neurological: She is alert and oriented to person, place, and time  She has normal reflexes  Skin: Skin is warm and dry  Psychiatric: She has a normal mood and affect   Her behavior is normal  Judgment and thought content normal

## 2018-04-25 ENCOUNTER — TELEPHONE (OUTPATIENT)
Dept: UROLOGY | Facility: AMBULATORY SURGERY CENTER | Age: 53
End: 2018-04-25

## 2018-04-25 NOTE — TELEPHONE ENCOUNTER
PC from patient calling regarding her 3 week post op and question regarding Vesicare  Per Franco Nurse, if patient is doing well, she may stop Vesicare    Patient stated that she is doing well and ov scheduled

## 2018-04-26 ENCOUNTER — OFFICE VISIT (OUTPATIENT)
Dept: PSYCHIATRY | Facility: CLINIC | Age: 53
End: 2018-04-26
Payer: COMMERCIAL

## 2018-04-26 DIAGNOSIS — N39.3 STRESS INCONTINENCE OF URINE: ICD-10-CM

## 2018-04-26 DIAGNOSIS — F31.81 BIPOLAR 2 DISORDER (HCC): Primary | ICD-10-CM

## 2018-04-26 DIAGNOSIS — M79.7 FIBROMYALGIA: ICD-10-CM

## 2018-04-26 PROCEDURE — 99214 OFFICE O/P EST MOD 30 MIN: CPT | Performed by: PSYCHIATRY & NEUROLOGY

## 2018-04-26 RX ORDER — BUPROPION HYDROCHLORIDE 150 MG/1
150 TABLET ORAL EVERY MORNING
Qty: 30 TABLET | Refills: 3 | Status: SHIPPED | OUTPATIENT
Start: 2018-04-26 | End: 2018-07-18 | Stop reason: SDUPTHER

## 2018-04-26 RX ORDER — LORAZEPAM 1 MG/1
TABLET ORAL
Qty: 120 TABLET | Refills: 3 | Status: SHIPPED | OUTPATIENT
Start: 2018-04-26 | End: 2018-07-18 | Stop reason: SDUPTHER

## 2018-04-26 RX ORDER — ARIPIPRAZOLE 5 MG/1
5 TABLET ORAL DAILY
Qty: 30 TABLET | Refills: 3 | Status: SHIPPED | OUTPATIENT
Start: 2018-04-26 | End: 2018-07-18 | Stop reason: SDUPTHER

## 2018-04-26 RX ORDER — ROPINIROLE 1 MG/1
1 TABLET, FILM COATED ORAL
Qty: 30 TABLET | Refills: 3 | Status: SHIPPED | OUTPATIENT
Start: 2018-04-26 | End: 2018-07-18 | Stop reason: SDUPTHER

## 2018-04-26 RX ORDER — ZALEPLON 10 MG/1
CAPSULE ORAL
Qty: 30 CAPSULE | Refills: 3 | Status: SHIPPED | OUTPATIENT
Start: 2018-04-26 | End: 2018-07-18

## 2018-04-26 RX ORDER — DULOXETIN HYDROCHLORIDE 60 MG/1
60 CAPSULE, DELAYED RELEASE ORAL DAILY
Qty: 30 CAPSULE | Refills: 3 | Status: SHIPPED | OUTPATIENT
Start: 2018-04-26 | End: 2018-07-18 | Stop reason: SDUPTHER

## 2018-04-26 NOTE — PSYCH
PROGRESS NOTE        [facility]      Name and Date of Birth:  Daksha Madrid 45 y o  1965    Date of Visit: 04/26/18    SUBJECTIVE:  Pt seen for follow up  She states she has not been sleeping well the past few weeks and has been sleeping 3 hours  She does have restless legs  She denies any other issues, denies depression  No panic attacks and but high anxiety  She is stressed about not working and has been looking for work  Physically she also had bladder sling surgery on Tuesday  Recuperating well from this  She denies suicidal ideation, intent or plan at present, has no suicidal ideation, intent or plan at present  She denies any auditory hallucinations and visual hallucinations, denies any other delusional thinking, denies any delusional thinking  She denies any side effects from medications    HPI ROS Appetite Changes and Sleep: normal appetite, normal sleep    Review Of Systems:      Constitutional Negative   ENT Negative   Cardiovascular Negative   Respiratory Negative   Gastrointestinal Negative   Genitourinary Negative   Musculoskeletal Negative   Integumentary Negative   Neurological Negative   Endocrine Negative   Other Symptoms Negative and None       Laboratory Results: No results found for this or any previous visit  Substance Abuse History:    History   Drug Use No       Family Psychiatric History:     Family History   Problem Relation Age of Onset    No Known Problems Father     No Known Problems Mother        The following portions of the patient's history were reviewed and updated as appropriate: past family history, past medical history, past social history, past surgical history and problem list     Social History     Social History    Marital status: /Civil Union     Spouse name: N/A    Number of children: N/A    Years of education: N/A     Occupational History    Not on file       Social History Main Topics    Smoking status: Current Every Day Smoker     Packs/day: 0 50     Types: Cigarettes    Smokeless tobacco: Never Used    Alcohol use Yes      Comment: social     Drug use: No    Sexual activity: Not on file     Other Topics Concern    Not on file     Social History Narrative    No narrative on file     Social History     Social History Narrative    No narrative on file        Social History     Tobacco History     Smoking Status  Current Every Day Smoker Smoking Frequency  0 5 packs/day Smoking Tobacco Type  Cigarettes    Smokeless Tobacco Use  Never Used          Alcohol History     Alcohol Use Status  Yes Comment  social           Drug Use     Drug Use Status  No          Sexual Activity     Sexually Active  Not Asked          Activities of Daily Living    Not Asked                     OBJECTIVE:     Mental Status Evaluation:    Appearance age appropriate, casually dressed   Behavior pleasant, cooperative   Speech normal volume, normal pitch   Mood appropriate   Affect euthymic   Thought Processes logical   Associations intact associations   Thought Content normal   Perceptual Disturbances: none   Abnormal Thoughts  Risk Potential Suicidal ideation - None  Homicidal ideation - None  Potential for aggression - No   Orientation oriented to person, place, time/date and situation   Memory recent and remote memory grossly intact   Cosciousness alert and awake   Attention Span attention span and concentration are age appropriate   Intellect Appears to be of Average Intelligence   Insight age appropriate    Judgement good    Muscle Strength and  Gait muscle strength and tone were normal   Language no difficulty naming common objects   Fund of Knowledge displays adequate knowledge of current events   Pain none   Pain Scale 0       Assessment/Plan:       Diagnoses and all orders for this visit:    Bipolar 2 disorder (HCC)  -     zaleplon (SONATA) 10 MG capsule; 1 po qhs prn  -     rOPINIRole (REQUIP) 1 mg tablet; Take 1 tablet (1 mg total) by mouth daily at bedtime  -     ARIPiprazole (ABILIFY) 5 mg tablet; Take 1 tablet (5 mg total) by mouth daily  -     buPROPion (WELLBUTRIN XL) 150 mg 24 hr tablet; Take 1 tablet (150 mg total) by mouth every morning  -     DULoxetine (CYMBALTA) 60 mg delayed release capsule; Take 1 capsule (60 mg total) by mouth daily  -     LORazepam (ATIVAN) 1 mg tablet; 1 po qid prn    Fibromyalgia    Stress incontinence of urine          Treatment Recommendations/Precautions:  She has been on Ambien, Lunesta, Melatonin    Trial Sonata and if this ineffective will consider Belsomra  Increase requip to 1 mg po qhs      Risks/Benefits      Risks, Benefits And Possible Side Effects Of Medications:    Risks, benefits, and possible side effects of medications explained to patient and patient verbalizes understanding and agreement for treatment      Controlled Medication Discussion:     Not applicable    Psychotherapy Provided:     Individual psychotherapy provided: No

## 2018-05-04 RX ORDER — ROPINIROLE 0.25 MG/1
TABLET, FILM COATED ORAL
Qty: 30 TABLET | Refills: 0 | OUTPATIENT
Start: 2018-05-04

## 2018-05-16 ENCOUNTER — OFFICE VISIT (OUTPATIENT)
Dept: UROLOGY | Facility: AMBULATORY SURGERY CENTER | Age: 53
End: 2018-05-16

## 2018-05-16 VITALS
SYSTOLIC BLOOD PRESSURE: 108 MMHG | WEIGHT: 200.38 LBS | HEART RATE: 72 BPM | DIASTOLIC BLOOD PRESSURE: 72 MMHG | BODY MASS INDEX: 31.45 KG/M2 | HEIGHT: 67 IN

## 2018-05-16 DIAGNOSIS — N39.3 FEMALE STRESS INCONTINENCE: Primary | ICD-10-CM

## 2018-05-16 PROCEDURE — 99024 POSTOP FOLLOW-UP VISIT: CPT | Performed by: UROLOGY

## 2018-05-16 RX ORDER — SOLIFENACIN SUCCINATE 10 MG/1
TABLET, FILM COATED ORAL
Refills: 0 | COMMUNITY
Start: 2018-05-06 | End: 2019-01-25

## 2018-05-16 NOTE — PROGRESS NOTES
5/16/2018    Dawn Sanna Severance  1965  [id number]        Assessment  Stress urinary incontinence status post mid urethral sling insertion      Discussion  The patient is very pleased with her mid urethral sling  I provided her with samples of Toviaz 4 mg daily for her urgency  I recommend that she discontinue the Toviaz in approximately 4-5 weeks time  Follow-up will be in 1 year sooner if needed  History of Present Illness  52 y o  female with a history of significant stress incontinence wearing multiple pads  She is status post mid urethral sling  Overall she is quite pleased with the results of the procedure  She denies any further incontinence  She feels like she is voiding well with an adequate urinary stream   She complains only of some urgency of urination  AUA Symptom Score      Review of Systems  Review of Systems   Constitutional: Negative  HENT: Negative  Eyes: Negative  Respiratory: Negative  Cardiovascular: Negative  Gastrointestinal: Negative  Endocrine: Negative  Genitourinary: Negative  Musculoskeletal: Negative  Skin: Negative  Allergic/Immunologic: Negative  Neurological: Negative  Hematological: Negative  Psychiatric/Behavioral: Negative  All other systems reviewed and are negative          Past Medical History  Past Medical History:   Diagnosis Date    Anxiety     Depression     Urinary incontinence        Past Social History  Past Surgical History:   Procedure Laterality Date    BACK SURGERY      fusion    CERVICAL SPINE SURGERY      CYSTOSCOPY  03/13/2018    Dr Mesa     MI LAP,SLING OPERATION N/A 4/24/2018    Procedure: INSERTION PUBOVAGINAL SLING (FEMALE) Trans obturator mid urethral sling insertion, CYSTOSCOPY;  Surgeon: Gregorio Zapien MD;  Location: [facility];  Service: Urology       Past Family History  Family History   Problem Relation Age of Onset    No Known Problems Father     No Known Problems Mother Past Social history  Social History     Social History    Marital status: /Civil Union     Spouse name: N/A    Number of children: N/A    Years of education: N/A     Occupational History    Not on file  Social History Main Topics    Smoking status: Current Every Day Smoker     Packs/day: 0 50     Types: Cigarettes    Smokeless tobacco: Never Used    Alcohol use Yes      Comment: social     Drug use: No    Sexual activity: Not on file     Other Topics Concern    Not on file     Social History Narrative    No narrative on file       Current Medications  Current Outpatient Prescriptions   Medication Sig Dispense Refill    ARIPiprazole (ABILIFY) 5 mg tablet Take 1 tablet (5 mg total) by mouth daily 30 tablet 3    buPROPion (WELLBUTRIN XL) 150 mg 24 hr tablet Take 1 tablet (150 mg total) by mouth every morning 30 tablet 3    DULoxetine (CYMBALTA) 60 mg delayed release capsule Take 1 capsule (60 mg total) by mouth daily 30 capsule 3    LORazepam (ATIVAN) 1 mg tablet 1 po qid prn 120 tablet 3    meloxicam (MOBIC) 15 mg tablet take 1 tablet by mouth once daily after meals  0    rOPINIRole (REQUIP) 1 mg tablet Take 1 tablet (1 mg total) by mouth daily at bedtime 30 tablet 3    VESICARE 10 MG tablet   0    zaleplon (SONATA) 10 MG capsule 1 po qhs prn 30 capsule 3     No current facility-administered medications for this visit  Allergies  Allergies   Allergen Reactions    Azithromycin Rash    Erythromycin Rash       Past Medical History, Social History, Family History, medications and allergies were reviewed      Vitals  Vitals:    05/16/18 0855   BP: 108/72   BP Location: Left arm   Patient Position: Sitting   Cuff Size: Standard   Pulse: 72   Weight: 90 9 kg (200 lb 6 oz)   Height: 5' 7" (1 702 m)       Physical Exam  Physical Exam        Results  No results found for: PSA  Lab Results   Component Value Date    CALCIUM 9 4 04/17/2018     04/17/2018    K 3 8 04/17/2018    CO2 25 04/17/2018     (H) 04/17/2018    BUN 16 04/17/2018    CREATININE 0 96 04/17/2018     Lab Results   Component Value Date    WBC 8 48 04/17/2018    HGB 15 3 04/17/2018    HCT 46 7 (H) 04/17/2018     (H) 04/17/2018     04/17/2018         Office Urine Dip  No results found for this or any previous visit (from the past 1 hour(s))  ]      Total visit time was 15 minutes of which over 50% was spent on counseling

## 2018-06-04 RX ORDER — ROPINIROLE 0.25 MG/1
TABLET, FILM COATED ORAL
Qty: 30 TABLET | Refills: 0 | OUTPATIENT
Start: 2018-06-04

## 2018-06-11 ENCOUNTER — TELEPHONE (OUTPATIENT)
Dept: UROLOGY | Facility: AMBULATORY SURGERY CENTER | Age: 53
End: 2018-06-11

## 2018-06-11 NOTE — TELEPHONE ENCOUNTER
Patient of Dr William Fernandes needs to be evaluated for disability based on urological condition  I am unable to find an appointment before September  Please help secure an appointment any date and time, but must be at [facility]

## 2018-06-27 NOTE — TELEPHONE ENCOUNTER
Left VM for pt to call back to schedule appt  There is currently an opening at 10:00 6/28 in [place]  If still available when pt calls that can be offered  If this appt is given please let Doretha Vasquez know immediately so chart can be prepped

## 2018-07-03 RX ORDER — ROPINIROLE 0.25 MG/1
TABLET, FILM COATED ORAL
Qty: 30 TABLET | Refills: 0 | OUTPATIENT
Start: 2018-07-03

## 2018-07-03 NOTE — TELEPHONE ENCOUNTER
Please try to call patient and schedule an appointment for her in next available follow up appointment in [place] office  Message was left for her when there was cancelled appointment available but patient apparently did not call back

## 2018-07-05 RX ORDER — ESZOPICLONE 3 MG/1
TABLET, FILM COATED ORAL
Qty: 30 TABLET | Refills: 3 | OUTPATIENT
Start: 2018-07-05

## 2018-07-18 ENCOUNTER — OFFICE VISIT (OUTPATIENT)
Dept: PSYCHIATRY | Facility: CLINIC | Age: 53
End: 2018-07-18

## 2018-07-18 DIAGNOSIS — F31.81 BIPOLAR 2 DISORDER (HCC): Primary | ICD-10-CM

## 2018-07-18 PROCEDURE — 99214 OFFICE O/P EST MOD 30 MIN: CPT | Performed by: PHYSICIAN ASSISTANT

## 2018-07-18 RX ORDER — LORAZEPAM 1 MG/1
TABLET ORAL
Qty: 120 TABLET | Refills: 5 | Status: SHIPPED | OUTPATIENT
Start: 2018-07-18 | End: 2019-01-25 | Stop reason: SDUPTHER

## 2018-07-18 RX ORDER — DULOXETIN HYDROCHLORIDE 60 MG/1
60 CAPSULE, DELAYED RELEASE ORAL DAILY
Qty: 30 CAPSULE | Refills: 5 | Status: SHIPPED | OUTPATIENT
Start: 2018-07-18 | End: 2019-01-25 | Stop reason: SDUPTHER

## 2018-07-18 RX ORDER — DICLOFENAC POTASSIUM 50 MG/1
TABLET, FILM COATED ORAL
Refills: 0 | COMMUNITY
Start: 2018-07-03 | End: 2019-01-25

## 2018-07-18 RX ORDER — ARIPIPRAZOLE 5 MG/1
5 TABLET ORAL DAILY
Qty: 30 TABLET | Refills: 5 | Status: SHIPPED | OUTPATIENT
Start: 2018-07-18 | End: 2019-01-25

## 2018-07-18 RX ORDER — BUPROPION HYDROCHLORIDE 150 MG/1
150 TABLET ORAL EVERY MORNING
Qty: 30 TABLET | Refills: 5 | Status: SHIPPED | OUTPATIENT
Start: 2018-07-18 | End: 2019-01-25 | Stop reason: SDUPTHER

## 2018-07-18 RX ORDER — ROPINIROLE 1 MG/1
1 TABLET, FILM COATED ORAL
Qty: 30 TABLET | Refills: 5 | Status: SHIPPED | OUTPATIENT
Start: 2018-07-18 | End: 2019-04-26 | Stop reason: SDUPTHER

## 2018-07-18 NOTE — PSYCH
PROGRESS NOTE        [facility]      Name and Date of Birth:  Az Gooden 52 y o  1965    Date of Visit: 07/18/18    SUBJECTIVE:  Pt seen for follow up  She states the Sonata did not help  She falls aaleep okay but awakens after a couple hours  She wakes up feeling anxious  Physically she has been having increased back/hip pain  She has bursitis in her hips  She his continuing to have injections but thus far has not been helpful  She is having increased difficulty with walking and standing for any length of time  More lethargic, dysphoric due to pain and inablilty to do things as she had  She has also been looking for work and not found anything yet  Frustrated and has no health insurance  She denies suicidal ideation, intent or plan at present, has no suicidal ideation, intent or plan at present  She denies any auditory hallucinations and visual hallucinations, denies any other delusional thinking, denies any delusional thinking  She denies any side effects from medications    HPI ROS Appetite Changes and Sleep: normal appetite, decreased sleep    Review Of Systems:      Constitutional Negative   ENT Negative   Cardiovascular Negative   Respiratory Negative   Gastrointestinal Negative   Genitourinary Negative   Musculoskeletal Negative   Integumentary Negative   Neurological Negative   Endocrine Negative   Other Symptoms Negative and None       Laboratory Results: No results found for this or any previous visit      Substance Abuse History:    History   Drug Use No       Family Psychiatric History:     Family History   Problem Relation Age of Onset    No Known Problems Father     No Known Problems Mother        The following portions of the patient's history were reviewed and updated as appropriate: past family history, past medical history, past social history, past surgical history and problem list     Social History Social History    Marital status: /Civil Union     Spouse name: N/A    Number of children: N/A    Years of education: N/A     Occupational History    Not on file       Social History Main Topics    Smoking status: Current Every Day Smoker     Packs/day: 0 50     Types: Cigarettes    Smokeless tobacco: Never Used    Alcohol use Yes      Comment: social     Drug use: No    Sexual activity: Not on file     Other Topics Concern    Not on file     Social History Narrative    No narrative on file     Social History     Social History Narrative    No narrative on file        Social History     Tobacco History     Smoking Status  Current Every Day Smoker Smoking Frequency  0 5 packs/day Smoking Tobacco Type  Cigarettes    Smokeless Tobacco Use  Never Used          Alcohol History     Alcohol Use Status  Yes Comment  social           Drug Use     Drug Use Status  No          Sexual Activity     Sexually Active  Not Asked          Activities of Daily Living    Not Asked                     OBJECTIVE:     Mental Status Evaluation:    Appearance age appropriate, casually dressed   Behavior pleasant, cooperative   Speech normal volume, normal pitch   Mood dysphoric   Affect Mood congruent   Thought Processes logical   Associations intact associations   Thought Content normal   Perceptual Disturbances: none   Abnormal Thoughts  Risk Potential Suicidal ideation - None  Homicidal ideation - None  Potential for aggression - No   Orientation oriented to person, place, time/date and situation   Memory recent and remote memory grossly intact   Cosciousness alert and awake   Attention Span attention span and concentration are age appropriate   Intellect Appears to be of Average Intelligence   Insight age appropriate    Judgement good    Muscle Strength and  Gait muscle strength and tone were normal   Language no difficulty naming common objects   Fund of Knowledge displays adequate knowledge of current events Pain none   Pain Scale 0       Assessment/Plan:       Diagnoses and all orders for this visit:    Bipolar 2 disorder (HCC)  -     ARIPiprazole (ABILIFY) 5 mg tablet; Take 1 tablet (5 mg total) by mouth daily  -     buPROPion (WELLBUTRIN XL) 150 mg 24 hr tablet; Take 1 tablet (150 mg total) by mouth every morning  -     rOPINIRole (REQUIP) 1 mg tablet; Take 1 tablet (1 mg total) by mouth daily at bedtime  -     DULoxetine (CYMBALTA) 60 mg delayed release capsule; Take 1 capsule (60 mg total) by mouth daily  -     LORazepam (ATIVAN) 1 mg tablet; 1 po qid prn    Other orders  -     diclofenac potassium (CATAFLAM) 50 mg tablet;           Treatment Recommendations/Precautions: Add Neurontin 300 mg po qhs prn  Wellbutrin  m g po qam  Gabapentin 300 mg po qhs  Abilify 5 mg po qd  Cymblata 60 mg po qd  Ativan 1 mg po qid prn      Continue current medications    Risks/Benefits      Risks, Benefits And Possible Side Effects Of Medications:    Risks, benefits, and possible side effects of medications explained to patient and patient verbalizes understanding and agreement for treatment      Controlled Medication Discussion:     Not applicable    Psychotherapy Provided:     Individual psychotherapy provided: No

## 2018-07-27 ENCOUNTER — TELEPHONE (OUTPATIENT)
Dept: UROLOGY | Facility: HOSPITAL | Age: 53
End: 2018-07-27

## 2018-07-27 NOTE — TELEPHONE ENCOUNTER
[place] [employer] sent signed medical record release for records   Records faxed to [phone] total of [facility]

## 2018-07-30 ENCOUNTER — TELEPHONE (OUTPATIENT)
Dept: PSYCHIATRY | Facility: CLINIC | Age: 53
End: 2018-07-30

## 2018-08-01 DIAGNOSIS — F41.9 ANXIETY: Primary | ICD-10-CM

## 2018-08-01 RX ORDER — GABAPENTIN 300 MG/1
300 CAPSULE ORAL
Qty: 90 CAPSULE | Refills: 1 | Status: SHIPPED | OUTPATIENT
Start: 2018-08-01 | End: 2018-09-06 | Stop reason: SDUPTHER

## 2018-08-02 RX ORDER — ROPINIROLE 0.25 MG/1
TABLET, FILM COATED ORAL
Qty: 30 TABLET | Refills: 0 | OUTPATIENT
Start: 2018-08-02

## 2018-08-08 RX ORDER — ESZOPICLONE 3 MG/1
TABLET, FILM COATED ORAL
Qty: 30 TABLET | Refills: 3 | OUTPATIENT
Start: 2018-08-08

## 2018-09-05 RX ORDER — ESZOPICLONE 3 MG/1
TABLET, FILM COATED ORAL
Qty: 30 TABLET | Refills: 3 | OUTPATIENT
Start: 2018-09-05

## 2018-09-06 ENCOUNTER — TELEPHONE (OUTPATIENT)
Dept: PSYCHIATRY | Facility: CLINIC | Age: 53
End: 2018-09-06

## 2018-09-06 DIAGNOSIS — F41.9 ANXIETY: ICD-10-CM

## 2018-09-06 RX ORDER — GABAPENTIN 300 MG/1
300 CAPSULE ORAL
Qty: 90 CAPSULE | Refills: 0 | Status: SHIPPED | OUTPATIENT
Start: 2018-09-06 | End: 2019-01-25

## 2018-09-26 ENCOUNTER — OFFICE VISIT (OUTPATIENT)
Dept: FAMILY MEDICINE CLINIC | Facility: CLINIC | Age: 53
End: 2018-09-26

## 2018-09-26 VITALS
HEART RATE: 86 BPM | TEMPERATURE: 97.8 F | BODY MASS INDEX: 31.45 KG/M2 | RESPIRATION RATE: 16 BRPM | OXYGEN SATURATION: 95 % | DIASTOLIC BLOOD PRESSURE: 78 MMHG | SYSTOLIC BLOOD PRESSURE: 110 MMHG | HEIGHT: 67 IN | WEIGHT: 200.4 LBS

## 2018-09-26 DIAGNOSIS — L03.115 CELLULITIS OF RIGHT LOWER EXTREMITY: Primary | ICD-10-CM

## 2018-09-26 PROCEDURE — 99212 OFFICE O/P EST SF 10 MIN: CPT | Performed by: NURSE PRACTITIONER

## 2018-09-26 RX ORDER — CEPHALEXIN 500 MG/1
500 CAPSULE ORAL EVERY 8 HOURS SCHEDULED
Qty: 30 CAPSULE | Refills: 0 | Status: SHIPPED | OUTPATIENT
Start: 2018-09-26 | End: 2018-10-06

## 2018-09-26 NOTE — PROGRESS NOTES
Assessment/Plan:      Diagnoses and all orders for this visit:    Cellulitis of right lower extremity  Comments:  Mild cellulitis of the right lower leg  Will start on Keflex t i d  for 10 days  Patient is instructed to elevate legs and apply warm compresses for 20 minute  Orders:  -     cephalexin (KEFLEX) 500 mg capsule; Take 1 capsule (500 mg total) by mouth every 8 (eight) hours for 10 days          Subjective:     Patient ID: John Marquez is a 52 y o  female here post fall 3weeks ago    HPI  She fell down steps and hurt her right leg  It still is edematous and red  She has had a scab on her right knee which is healing but slowly  Review of Systems   Constitutional: Positive for activity change  Respiratory: Negative  Cardiovascular: Negative  Musculoskeletal: Positive for arthralgias  Skin: Positive for color change (redness at the right shin)  Allergic/Immunologic: Negative  Psychiatric/Behavioral: Negative  Objective:     Physical Exam   Constitutional: She is oriented to person, place, and time  She appears well-developed and well-nourished  HENT:   Head: Normocephalic  Eyes: Conjunctivae and EOM are normal    Cardiovascular: Normal rate, regular rhythm, normal heart sounds and intact distal pulses  Pulmonary/Chest: Effort normal    Musculoskeletal: She exhibits edema (right lower leg  Edema is +2)  Neurological: She is alert and oriented to person, place, and time  Skin: Skin is warm and dry  There is erythema (Right lower leg is red, edematous with peeling skin and increased warmth )  Psychiatric: Her behavior is normal  Judgment normal  Her affect is blunt  Her speech is delayed   Cognition and memory are normal

## 2018-10-03 RX ORDER — ESZOPICLONE 3 MG/1
TABLET, FILM COATED ORAL
Qty: 30 TABLET | Refills: 3 | OUTPATIENT
Start: 2018-10-03

## 2019-01-25 ENCOUNTER — OFFICE VISIT (OUTPATIENT)
Dept: PSYCHIATRY | Facility: CLINIC | Age: 54
End: 2019-01-25

## 2019-01-25 VITALS — BODY MASS INDEX: 31.86 KG/M2 | RESPIRATION RATE: 18 BRPM | HEIGHT: 67 IN | WEIGHT: 203 LBS

## 2019-01-25 DIAGNOSIS — F31.81 BIPOLAR 2 DISORDER (HCC): Primary | ICD-10-CM

## 2019-01-25 PROCEDURE — 99214 OFFICE O/P EST MOD 30 MIN: CPT | Performed by: PHYSICIAN ASSISTANT

## 2019-01-25 RX ORDER — TRAZODONE HYDROCHLORIDE 100 MG/1
100 TABLET ORAL
Qty: 30 TABLET | Refills: 3 | Status: SHIPPED | OUTPATIENT
Start: 2019-01-25 | End: 2019-04-26 | Stop reason: SDUPTHER

## 2019-01-25 RX ORDER — BUPROPION HYDROCHLORIDE 300 MG/1
300 TABLET ORAL EVERY MORNING
Qty: 30 TABLET | Refills: 3 | Status: SHIPPED | OUTPATIENT
Start: 2019-01-25 | End: 2019-04-26 | Stop reason: SDUPTHER

## 2019-01-25 RX ORDER — DULOXETIN HYDROCHLORIDE 60 MG/1
60 CAPSULE, DELAYED RELEASE ORAL DAILY
Qty: 30 CAPSULE | Refills: 3 | Status: SHIPPED | OUTPATIENT
Start: 2019-01-25 | End: 2019-05-06

## 2019-01-25 RX ORDER — LORAZEPAM 1 MG/1
TABLET ORAL
Qty: 120 TABLET | Refills: 3 | Status: SHIPPED | OUTPATIENT
Start: 2019-01-25 | End: 2019-04-26 | Stop reason: SDUPTHER

## 2019-01-25 NOTE — PSYCH
PROGRESS NOTE        [facility]      Name and Date of Birth:  Colin Mcclure 52 y o  1965    Date of Visit: 01/25/19    SUBJECTIVE:    Patient reports she no longer has insurance coverage so she stopped taking Abilify  Patient has not been sleeping well, able to fall asleep but will wake after 2 hours and has difficulty getting back to sleep  Patient rates her anxiety level at 10 out of 10, 10 being the worst    filed for bankruptcy, significant financial issues  Patient reports her depression at a 5/10, 10 being the worst   She states she has a decreased interest in her normal daily activities  She continues to report her pain at a 5/10, 10 being the worst which is chronic in nature in her back and bilateral hips  She continues to see pain management and receive injections for her bursitis  She reports difficulty with mobility though she is not using assistive devices at today's visit though she does use a cane from time to time  She is now on disability and reports that she needs to wait 2 years to receive Medicare benefits which is a significant stressor for her  She denies suicidal ideation, intent or plan at present, has no suicidal ideation, intent or plan at present  She denies any auditory hallucinations and visual hallucinations, denies any other delusional thinking, denies any delusional thinking  She denies any side effects from medications    HPI ROS Appetite Changes and Sleep:  Decreased appetite with no weight change, decreased sleep    Review Of Systems:      Constitutional Negative   ENT Negative   Cardiovascular Negative   Respiratory Negative   Gastrointestinal Negative   Genitourinary Negative   Musculoskeletal Negative   Integumentary Negative   Neurological Negative   Endocrine Negative   Other Symptoms Negative and None       Laboratory Results: No results found for this or any previous visit    Patient reports she has not received lab work recently due to lack of health insurance  Substance Abuse History:    History   Drug Use No       Family Psychiatric History:     Family History   Problem Relation Age of Onset    No Known Problems Father     No Known Problems Mother        The following portions of the patient's history were reviewed and updated as appropriate: past family history, past medical history, past social history, past surgical history and problem list     Social History     Social History    Marital status: /Civil Union     Spouse name: N/A    Number of children: N/A    Years of education: N/A     Occupational History    Not on file       Social History Main Topics    Smoking status: Current Every Day Smoker     Packs/day: 0 50     Types: Cigarettes    Smokeless tobacco: Never Used    Alcohol use Yes      Comment: social     Drug use: No    Sexual activity: Not on file     Other Topics Concern    Not on file     Social History Narrative    No narrative on file     Social History     Social History Narrative    No narrative on file        Social History     Tobacco History     Smoking Status  Current Every Day Smoker Smoking Frequency  0 5 packs/day Smoking Tobacco Type  Cigarettes    Smokeless Tobacco Use  Never Used          Alcohol History     Alcohol Use Status  Yes Comment  social           Drug Use     Drug Use Status  No          Sexual Activity     Sexually Active  Not Asked          Activities of Daily Living    Not Asked                     OBJECTIVE:     Mental Status Evaluation:    Appearance age appropriate, casually dressed   Behavior pleasant, cooperative   Speech Soft and decreased rate   Mood Depressed, anxious , dysphoric   Affect Mood congruent   Thought Processes logical   Associations intact associations   Thought Content normal   Perceptual Disturbances: none   Abnormal Thoughts  Risk Potential Suicidal ideation - None  Homicidal ideation - None  Potential for aggression - No   Orientation oriented to person, place, time/date and situation   Memory recent and remote memory grossly intact   Cosciousness alert and awake   Attention Span attention span and concentration are age appropriate   Intellect Appears to be of Average Intelligence   Insight age appropriate    Judgement good    Muscle Strength and  Gait muscle strength and tone were normal   Language no difficulty naming common objects   Fund of Knowledge displays adequate knowledge of current events   Pain none   Pain Scale 5 back and both hips sees pain management       Assessment/Plan:       Diagnoses and all orders for this visit:    Bipolar 2 disorder ([facility] [address] )  -     traZODone (DESYREL) 100 mg tablet; Take 1 tablet (100 mg total) by mouth daily at bedtime Take 1/2 tab to 1 tab PO Q HS PRN  -     buPROPion (WELLBUTRIN XL) 300 mg 24 hr tablet; Take 1 tablet (300 mg total) by mouth every morning  -     DULoxetine (CYMBALTA) 60 mg delayed release capsule; Take 1 capsule (60 mg total) by mouth daily  -     LORazepam (ATIVAN) 1 mg tablet; 1 po qid prn      Follow-up in 3 months    Treatment Recommendations/Precautions:    Continue current medications: Add trazodone 100 mg p o  Q h s  Take half to 1 tablet q h s  P r n  for decreased sleep  Increase Wellbutrin from 150 mg p o  Q a m  To 300 mg p o  Q a m  for continued depression  Continue Cymbalta 60 mg delayed release q a m  Continue Ativan 1 mg p o  Q i d  P r n  Patient discontinued on her own the following medications:  Neurontin 300 mg q h s  P r n  Abilify 5 mg p  O  Daily due to inability to afford  Requip 1 mg p o  Q h s  Risks/Benefits      Risks, Benefits And Possible Side Effects Of Medications:    Risks, benefits, and possible side effects of medications explained to patient and patient verbalizes understanding and agreement for treatment      Controlled Medication Discussion:     Not applicable    Psychotherapy Provided:     Individual psychotherapy provided: No

## 2019-01-29 DIAGNOSIS — F31.81 BIPOLAR 2 DISORDER (HCC): ICD-10-CM

## 2019-01-30 RX ORDER — BUPROPION HYDROCHLORIDE 150 MG/1
TABLET ORAL
Qty: 30 TABLET | Refills: 5 | OUTPATIENT
Start: 2019-01-30

## 2019-01-30 RX ORDER — DULOXETIN HYDROCHLORIDE 60 MG/1
CAPSULE, DELAYED RELEASE ORAL
Qty: 30 CAPSULE | Refills: 5 | OUTPATIENT
Start: 2019-01-30

## 2019-01-31 RX ORDER — ESZOPICLONE 3 MG/1
TABLET, FILM COATED ORAL
Qty: 30 TABLET | Refills: 3 | OUTPATIENT
Start: 2019-01-31

## 2019-01-31 NOTE — TELEPHONE ENCOUNTER
Received fax from [facility] requesting vesicare refill  Confirmed with pt , no longer on med  Faxed from back to pharmacy no longer on medication

## 2019-01-31 NOTE — TELEPHONE ENCOUNTER
Received voicemail and left patient a message regarding trazodone and instructed to take 150 mg total at bedtime  Patient instructed to call if any questions or issues

## 2019-02-28 ENCOUNTER — TELEPHONE (OUTPATIENT)
Dept: FAMILY MEDICINE CLINIC | Facility: CLINIC | Age: 54
End: 2019-02-28

## 2019-02-28 DIAGNOSIS — F31.81 BIPOLAR 2 DISORDER (HCC): ICD-10-CM

## 2019-02-28 RX ORDER — DULOXETIN HYDROCHLORIDE 60 MG/1
CAPSULE, DELAYED RELEASE ORAL
Qty: 30 CAPSULE | Refills: 5 | OUTPATIENT
Start: 2019-02-28

## 2019-02-28 RX ORDER — BUPROPION HYDROCHLORIDE 150 MG/1
TABLET ORAL
Qty: 30 TABLET | Refills: 5 | OUTPATIENT
Start: 2019-02-28

## 2019-03-02 RX ORDER — ESZOPICLONE 3 MG/1
TABLET, FILM COATED ORAL
Qty: 30 TABLET | Refills: 3 | OUTPATIENT
Start: 2019-03-02

## 2019-03-28 ENCOUNTER — TELEPHONE (OUTPATIENT)
Dept: PSYCHIATRY | Facility: CLINIC | Age: 54
End: 2019-03-28

## 2019-03-28 ENCOUNTER — TELEPHONE (OUTPATIENT)
Dept: FAMILY MEDICINE CLINIC | Facility: CLINIC | Age: 54
End: 2019-03-28

## 2019-03-28 NOTE — TELEPHONE ENCOUNTER
Spoke with patient's spouse, Kennyjen Abrahamjennifer had called during the night and left at a message with the answering service  He was upset that he did not receive a call back  He reports that Miguelina overdosed on her tramadol last night with alcohol in a suicide attempt and he called the police  She was subsequently taken to [facility]   He states that she is currently there in the emergency room and will be admitted to the psychiatric unit  Kennyjen Abrahamjennifer also tells me that she has been drinking for the past five years which she has been hiding from everyone including all her doctors  I will attempt to contact [facility] for continuity care  Thierry did tell me that they are aware that [facility] is seen here at our office

## 2019-03-28 NOTE — TELEPHONE ENCOUNTER
Jordan Rojas called to inform Kelly Lockwood pt tried to comit suicide last night and was taken to [facility]  Jazz Agarwal states to call him at [phone] should you have any questions

## 2019-03-28 NOTE — TELEPHONE ENCOUNTER
Demetrio Mas at number provided by the Emergency Room [phone]  For behavioral health and there was no answer and unable to leave a voicemail

## 2019-03-30 DIAGNOSIS — F31.81 BIPOLAR 2 DISORDER (HCC): ICD-10-CM

## 2019-04-01 RX ORDER — DULOXETIN HYDROCHLORIDE 60 MG/1
CAPSULE, DELAYED RELEASE ORAL
Qty: 30 CAPSULE | Refills: 5 | OUTPATIENT
Start: 2019-04-01

## 2019-04-01 RX ORDER — BUPROPION HYDROCHLORIDE 150 MG/1
TABLET ORAL
Qty: 30 TABLET | Refills: 5 | OUTPATIENT
Start: 2019-04-01

## 2019-04-01 RX ORDER — ESZOPICLONE 3 MG/1
TABLET, FILM COATED ORAL
Qty: 30 TABLET | Refills: 3 | OUTPATIENT
Start: 2019-04-01

## 2019-04-12 ENCOUNTER — TELEPHONE (OUTPATIENT)
Dept: PSYCHIATRY | Facility: CLINIC | Age: 54
End: 2019-04-12

## 2019-04-16 ENCOUNTER — OFFICE VISIT (OUTPATIENT)
Dept: FAMILY MEDICINE CLINIC | Facility: CLINIC | Age: 54
End: 2019-04-16

## 2019-04-16 VITALS
HEIGHT: 67 IN | BODY MASS INDEX: 29.35 KG/M2 | DIASTOLIC BLOOD PRESSURE: 80 MMHG | TEMPERATURE: 97 F | HEART RATE: 109 BPM | OXYGEN SATURATION: 97 % | WEIGHT: 187 LBS | RESPIRATION RATE: 16 BRPM | SYSTOLIC BLOOD PRESSURE: 112 MMHG

## 2019-04-16 DIAGNOSIS — M54.50 CHRONIC BILATERAL LOW BACK PAIN WITHOUT SCIATICA: ICD-10-CM

## 2019-04-16 DIAGNOSIS — G89.29 CHRONIC BILATERAL LOW BACK PAIN WITHOUT SCIATICA: ICD-10-CM

## 2019-04-16 DIAGNOSIS — R05.8 COUGH WITH EXPECTORATION: Primary | ICD-10-CM

## 2019-04-16 PROCEDURE — 99214 OFFICE O/P EST MOD 30 MIN: CPT | Performed by: NURSE PRACTITIONER

## 2019-04-16 RX ORDER — TIZANIDINE 4 MG/1
4 TABLET ORAL EVERY 8 HOURS PRN
Qty: 90 TABLET | Refills: 1 | Status: SHIPPED | OUTPATIENT
Start: 2019-04-16 | End: 2019-06-27 | Stop reason: SDUPTHER

## 2019-04-16 RX ORDER — AMOXICILLIN 500 MG/1
500 TABLET, FILM COATED ORAL 2 TIMES DAILY
Qty: 14 TABLET | Refills: 0 | Status: SHIPPED | OUTPATIENT
Start: 2019-04-16 | End: 2019-04-23

## 2019-04-16 RX ORDER — NAPROXEN 500 MG/1
500 TABLET ORAL 2 TIMES DAILY WITH MEALS
Qty: 60 TABLET | Refills: 1 | Status: SHIPPED | OUTPATIENT
Start: 2019-04-16 | End: 2020-03-11

## 2019-04-18 ENCOUNTER — TELEPHONE (OUTPATIENT)
Dept: FAMILY MEDICINE CLINIC | Facility: CLINIC | Age: 54
End: 2019-04-18

## 2019-04-26 ENCOUNTER — OFFICE VISIT (OUTPATIENT)
Dept: PSYCHIATRY | Facility: CLINIC | Age: 54
End: 2019-04-26

## 2019-04-26 VITALS — HEIGHT: 67 IN | WEIGHT: 187 LBS | RESPIRATION RATE: 18 BRPM | BODY MASS INDEX: 29.35 KG/M2

## 2019-04-26 DIAGNOSIS — F31.81 BIPOLAR 2 DISORDER (HCC): Primary | ICD-10-CM

## 2019-04-26 DIAGNOSIS — F10.10 ALCOHOL ABUSE: ICD-10-CM

## 2019-04-26 DIAGNOSIS — F31.81 BIPOLAR 2 DISORDER (HCC): ICD-10-CM

## 2019-04-26 PROCEDURE — 99213 OFFICE O/P EST LOW 20 MIN: CPT | Performed by: PHYSICIAN ASSISTANT

## 2019-04-26 RX ORDER — LORAZEPAM 1 MG/1
TABLET ORAL
Qty: 120 TABLET | Refills: 3 | Status: SHIPPED | OUTPATIENT
Start: 2019-04-26 | End: 2019-04-26 | Stop reason: SDUPTHER

## 2019-04-26 RX ORDER — TRAZODONE HYDROCHLORIDE 100 MG/1
100 TABLET ORAL
Qty: 30 TABLET | Refills: 3 | Status: SHIPPED | OUTPATIENT
Start: 2019-04-26 | End: 2019-07-05

## 2019-04-26 RX ORDER — OXCARBAZEPINE 150 MG/1
TABLET, FILM COATED ORAL
Qty: 120 TABLET | Refills: 3 | Status: SHIPPED | OUTPATIENT
Start: 2019-04-26 | End: 2019-05-08 | Stop reason: SDUPTHER

## 2019-04-26 RX ORDER — LORAZEPAM 1 MG/1
TABLET ORAL
Qty: 120 TABLET | Refills: 3 | Status: SHIPPED | OUTPATIENT
Start: 2019-04-26 | End: 2019-08-20 | Stop reason: SDUPTHER

## 2019-04-26 RX ORDER — BUPROPION HYDROCHLORIDE 300 MG/1
300 TABLET ORAL EVERY MORNING
Qty: 30 TABLET | Refills: 3 | Status: SHIPPED | OUTPATIENT
Start: 2019-04-26 | End: 2019-07-05 | Stop reason: SDUPTHER

## 2019-04-26 RX ORDER — ROPINIROLE 1 MG/1
1 TABLET, FILM COATED ORAL
Qty: 30 TABLET | Refills: 3 | Status: SHIPPED | OUTPATIENT
Start: 2019-04-26 | End: 2019-07-05 | Stop reason: SDUPTHER

## 2019-04-29 ENCOUNTER — TELEPHONE (OUTPATIENT)
Dept: PSYCHIATRY | Facility: CLINIC | Age: 54
End: 2019-04-29

## 2019-04-29 DIAGNOSIS — F31.81 BIPOLAR 2 DISORDER (HCC): ICD-10-CM

## 2019-04-29 RX ORDER — BUPROPION HYDROCHLORIDE 150 MG/1
TABLET ORAL
Qty: 30 TABLET | Refills: 5 | OUTPATIENT
Start: 2019-04-29

## 2019-04-29 RX ORDER — DULOXETIN HYDROCHLORIDE 60 MG/1
CAPSULE, DELAYED RELEASE ORAL
Qty: 30 CAPSULE | Refills: 5 | OUTPATIENT
Start: 2019-04-29

## 2019-04-30 ENCOUNTER — TELEPHONE (OUTPATIENT)
Dept: PSYCHIATRY | Facility: CLINIC | Age: 54
End: 2019-04-30

## 2019-05-01 RX ORDER — ESZOPICLONE 3 MG/1
TABLET, FILM COATED ORAL
Qty: 30 TABLET | Refills: 3 | OUTPATIENT
Start: 2019-05-01

## 2019-05-06 ENCOUNTER — OFFICE VISIT (OUTPATIENT)
Dept: FAMILY MEDICINE CLINIC | Facility: CLINIC | Age: 54
End: 2019-05-06

## 2019-05-06 VITALS
TEMPERATURE: 97.9 F | HEIGHT: 67 IN | OXYGEN SATURATION: 98 % | BODY MASS INDEX: 30.32 KG/M2 | SYSTOLIC BLOOD PRESSURE: 114 MMHG | DIASTOLIC BLOOD PRESSURE: 88 MMHG | HEART RATE: 87 BPM | RESPIRATION RATE: 16 BRPM | WEIGHT: 193.2 LBS

## 2019-05-06 DIAGNOSIS — Z12.11 ENCOUNTER FOR SCREENING FOR MALIGNANT NEOPLASM OF COLON: ICD-10-CM

## 2019-05-06 DIAGNOSIS — Z12.31 ENCOUNTER FOR SCREENING MAMMOGRAM FOR MALIGNANT NEOPLASM OF BREAST: Primary | ICD-10-CM

## 2019-05-06 DIAGNOSIS — J30.1 NON-SEASONAL ALLERGIC RHINITIS DUE TO POLLEN: ICD-10-CM

## 2019-05-06 PROCEDURE — 99213 OFFICE O/P EST LOW 20 MIN: CPT | Performed by: NURSE PRACTITIONER

## 2019-05-08 ENCOUNTER — TELEPHONE (OUTPATIENT)
Dept: PSYCHIATRY | Facility: CLINIC | Age: 54
End: 2019-05-08

## 2019-05-08 ENCOUNTER — TELEPHONE (OUTPATIENT)
Dept: FAMILY MEDICINE CLINIC | Facility: CLINIC | Age: 54
End: 2019-05-08

## 2019-05-08 DIAGNOSIS — F31.81 BIPOLAR 2 DISORDER (HCC): ICD-10-CM

## 2019-05-08 RX ORDER — OXCARBAZEPINE 300 MG/1
TABLET, FILM COATED ORAL
Qty: 90 TABLET | Refills: 1 | Status: SHIPPED | OUTPATIENT
Start: 2019-05-08 | End: 2019-05-17 | Stop reason: SINTOL

## 2019-05-09 ENCOUNTER — TELEPHONE (OUTPATIENT)
Dept: FAMILY MEDICINE CLINIC | Facility: CLINIC | Age: 54
End: 2019-05-09

## 2019-05-13 ENCOUNTER — TELEPHONE (OUTPATIENT)
Dept: PSYCHIATRY | Facility: CLINIC | Age: 54
End: 2019-05-13

## 2019-05-16 ENCOUNTER — TELEPHONE (OUTPATIENT)
Dept: FAMILY MEDICINE CLINIC | Facility: CLINIC | Age: 54
End: 2019-05-16

## 2019-05-17 ENCOUNTER — TELEPHONE (OUTPATIENT)
Dept: PSYCHIATRY | Facility: CLINIC | Age: 54
End: 2019-05-17

## 2019-05-17 DIAGNOSIS — F31.81 BIPOLAR 2 DISORDER (HCC): ICD-10-CM

## 2019-05-17 RX ORDER — LAMOTRIGINE 25 MG/1
TABLET ORAL
Qty: 60 TABLET | Refills: 1 | Status: SHIPPED | OUTPATIENT
Start: 2019-05-17 | End: 2019-07-05 | Stop reason: SDUPTHER

## 2019-05-22 ENCOUNTER — TELEPHONE (OUTPATIENT)
Dept: FAMILY MEDICINE CLINIC | Facility: CLINIC | Age: 54
End: 2019-05-22

## 2019-05-22 DIAGNOSIS — F31.81 BIPOLAR 2 DISORDER (HCC): ICD-10-CM

## 2019-05-24 RX ORDER — LORAZEPAM 1 MG/1
TABLET ORAL
Qty: 120 TABLET | Refills: 3 | OUTPATIENT
Start: 2019-05-24

## 2019-05-31 DIAGNOSIS — F31.81 BIPOLAR 2 DISORDER (HCC): ICD-10-CM

## 2019-05-31 RX ORDER — ESZOPICLONE 3 MG/1
TABLET, FILM COATED ORAL
Qty: 30 TABLET | Refills: 3 | OUTPATIENT
Start: 2019-05-31

## 2019-05-31 RX ORDER — DULOXETIN HYDROCHLORIDE 60 MG/1
CAPSULE, DELAYED RELEASE ORAL
Qty: 30 CAPSULE | Refills: 5 | OUTPATIENT
Start: 2019-05-31

## 2019-05-31 RX ORDER — BUPROPION HYDROCHLORIDE 150 MG/1
TABLET ORAL
Qty: 30 TABLET | Refills: 5 | OUTPATIENT
Start: 2019-05-31

## 2019-06-05 ENCOUNTER — OFFICE VISIT (OUTPATIENT)
Dept: PSYCHIATRY | Facility: CLINIC | Age: 54
End: 2019-06-05

## 2019-06-05 DIAGNOSIS — F10.11 HISTORY OF ALCOHOL ABUSE: ICD-10-CM

## 2019-06-05 DIAGNOSIS — F51.01 PRIMARY INSOMNIA: ICD-10-CM

## 2019-06-05 DIAGNOSIS — F31.62 BIPOLAR 1 DISORDER, MIXED, MODERATE (HCC): Primary | ICD-10-CM

## 2019-06-05 PROCEDURE — 99213 OFFICE O/P EST LOW 20 MIN: CPT | Performed by: PHYSICIAN ASSISTANT

## 2019-06-05 RX ORDER — GABAPENTIN 100 MG/1
CAPSULE ORAL
Qty: 180 CAPSULE | Refills: 1 | Status: SHIPPED | OUTPATIENT
Start: 2019-06-05 | End: 2019-07-05 | Stop reason: SDUPTHER

## 2019-06-24 ENCOUNTER — TELEPHONE (OUTPATIENT)
Dept: FAMILY MEDICINE CLINIC | Facility: CLINIC | Age: 54
End: 2019-06-24

## 2019-06-25 ENCOUNTER — TELEPHONE (OUTPATIENT)
Dept: OTHER | Facility: OTHER | Age: 54
End: 2019-06-25

## 2019-06-27 ENCOUNTER — TELEPHONE (OUTPATIENT)
Dept: FAMILY MEDICINE CLINIC | Facility: CLINIC | Age: 54
End: 2019-06-27

## 2019-06-27 DIAGNOSIS — M54.50 CHRONIC BILATERAL LOW BACK PAIN WITHOUT SCIATICA: ICD-10-CM

## 2019-06-27 DIAGNOSIS — G89.29 CHRONIC BILATERAL LOW BACK PAIN WITHOUT SCIATICA: ICD-10-CM

## 2019-06-28 RX ORDER — TIZANIDINE 4 MG/1
4 TABLET ORAL EVERY 8 HOURS PRN
Qty: 90 TABLET | Refills: 1 | Status: SHIPPED | OUTPATIENT
Start: 2019-06-28 | End: 2019-09-04 | Stop reason: SDUPTHER

## 2019-07-05 ENCOUNTER — OFFICE VISIT (OUTPATIENT)
Dept: PSYCHIATRY | Facility: CLINIC | Age: 54
End: 2019-07-05

## 2019-07-05 DIAGNOSIS — F31.62 BIPOLAR 1 DISORDER, MIXED, MODERATE (HCC): ICD-10-CM

## 2019-07-05 DIAGNOSIS — F31.81 BIPOLAR 2 DISORDER (HCC): ICD-10-CM

## 2019-07-05 PROCEDURE — 99213 OFFICE O/P EST LOW 20 MIN: CPT | Performed by: PHYSICIAN ASSISTANT

## 2019-07-05 RX ORDER — LAMOTRIGINE 150 MG/1
TABLET ORAL
Qty: 30 TABLET | Refills: 2 | Status: SHIPPED | OUTPATIENT
Start: 2019-07-05 | End: 2019-10-01 | Stop reason: SDUPTHER

## 2019-07-05 RX ORDER — BUPROPION HYDROCHLORIDE 300 MG/1
300 TABLET ORAL EVERY MORNING
Qty: 30 TABLET | Refills: 2 | Status: SHIPPED | OUTPATIENT
Start: 2019-07-05 | End: 2019-09-12 | Stop reason: SDUPTHER

## 2019-07-05 RX ORDER — ROPINIROLE 1 MG/1
1 TABLET, FILM COATED ORAL
Qty: 30 TABLET | Refills: 2 | Status: SHIPPED | OUTPATIENT
Start: 2019-07-05 | End: 2019-10-13 | Stop reason: SDUPTHER

## 2019-07-05 RX ORDER — GABAPENTIN 300 MG/1
CAPSULE ORAL
Qty: 120 CAPSULE | Refills: 2 | Status: SHIPPED | OUTPATIENT
Start: 2019-07-05 | End: 2019-09-26 | Stop reason: SDUPTHER

## 2019-07-05 NOTE — PSYCH
PROGRESS NOTE        Massimo Catalan      Name and Date of Birth:  Vasiliy Mar 62 y o  1965    Date of Visit: 07/05/19    SUBJECTIVE:  Patient seen for follow-up of bipolar disorder, insomnia and history of alcohol abuse  States she is continuing to have mood lability and some anger outbursts  She is feeling slightly better though since being on higher dose of lamictal   No side effects with meds, no rash  The stepdaughter moved out which has been better for her at home  States that her daughter is coming to visit though next week with the grandchildren so she has anxiety about that  Her and her daughter have also had stressors and the past and she states that her daughter is psychologist   Reports that at time she has been irritable with her spouse but states that this is also improving  She continues with some impulsivity and blurting things out  Overall she is slightly improved over the past couple months though  States that she is taking her medications as prescribed  Reports that the gabapentin has been helping more with her anxiety than the Ativan  Sleep has fair been disrupted but she is taking a nap in the afternoons  She denies suicidal ideation, intent or plan at present, has no suicidal ideation, intent or plan at present  She denies any auditory hallucinations and visual hallucinations, denies any other delusional thinking, denies any delusional thinking  She denies any side effects from medications      HPI ROS Appetite Changes and Sleep: normal appetite, disrupted sleep    Review Of Systems:      Constitutional Negative   ENT Negative   Cardiovascular Negative   Respiratory Negative   Gastrointestinal Negative   Genitourinary Negative   Musculoskeletal Negative   Integumentary Negative   Neurological Negative   Endocrine Negative   Other Symptoms Negative and None       Laboratory Results: No results found for this or any previous visit      Substance Abuse History:    Social History     Substance and Sexual Activity   Drug Use No       Family Psychiatric History:     Family History   Problem Relation Age of Onset    No Known Problems Father     No Known Problems Mother        The following portions of the patient's history were reviewed and updated as appropriate: past family history, past medical history, past social history, past surgical history and problem list     Social History     Socioeconomic History    Marital status: /Civil Union     Spouse name: Not on file    Number of children: Not on file    Years of education: Not on file    Highest education level: Not on file   Occupational History    Not on file   Social Needs    Financial resource strain: Not on file    Food insecurity:     Worry: Not on file     Inability: Not on file    Transportation needs:     Medical: Not on file     Non-medical: Not on file   Tobacco Use    Smoking status: Current Every Day Smoker     Packs/day: 0 50     Types: Cigarettes    Smokeless tobacco: Never Used   Substance and Sexual Activity    Alcohol use: Yes     Comment: social     Drug use: No    Sexual activity: Not on file   Lifestyle    Physical activity:     Days per week: Not on file     Minutes per session: Not on file    Stress: Not on file   Relationships    Social connections:     Talks on phone: Not on file     Gets together: Not on file     Attends Church service: Not on file     Active member of club or organization: Not on file     Attends meetings of clubs or organizations: Not on file     Relationship status: Not on file    Intimate partner violence:     Fear of current or ex partner: Not on file     Emotionally abused: Not on file     Physically abused: Not on file     Forced sexual activity: Not on file   Other Topics Concern    Not on file   Social History Narrative    Not on file     Social History     Social History Narrative    Not on file Social History     Tobacco History     Smoking Status  Current Every Day Smoker Smoking Frequency  0 5 packs/day Smoking Tobacco Type  Cigarettes    Smokeless Tobacco Use  Never Used          Alcohol History     Alcohol Use Status  Yes Comment  social           Drug Use     Drug Use Status  No          Sexual Activity     Sexually Active  Not Asked          Activities of Daily Living    Not Asked                     OBJECTIVE:     Mental Status Evaluation:    Appearance age appropriate, casually dressed   Behavior pleasant, cooperative   Speech normal volume, normal pitch   Mood Labile   Affect Congruent   Thought Processes logical   Associations intact associations   Thought Content normal   Perceptual Disturbances: none   Abnormal Thoughts  Risk Potential Suicidal ideation - None  Homicidal ideation - None  Potential for aggression - No   Orientation oriented to person, place, time/date and situation   Memory recent and remote memory grossly intact   Cosciousness alert and awake   Attention Span attention span and concentration are age appropriate   Intellect Appears to be of Average Intelligence   Insight age appropriate    Judgement good    Muscle Strength and  Gait muscle strength and tone were normal   Language no difficulty naming common objects   Fund of Knowledge displays adequate knowledge of current events   Pain none   Pain Scale 0       Assessment/Plan:       Diagnoses and all orders for this visit:    Bipolar 1 disorder, mixed, moderate (HCC)  -     gabapentin (NEURONTIN) 300 mg capsule; 1 po bid and 2 po qhs    Bipolar 2 disorder (HCC)  -     lamoTRIgine (LaMICtal) 150 MG tablet; 1 po qhs  -     buPROPion (WELLBUTRIN XL) 300 mg 24 hr tablet; Take 1 tablet (300 mg total) by mouth every morning  -     rOPINIRole (REQUIP) 1 mg tablet; Take 1 tablet (1 mg total) by mouth daily at bedtime          Treatment Recommendations/Precautions:  Increased Gabapentin to 300 mg b i d   And 600 mg at HS  Increased Lamictal 150 mg at HS starting July 8th  Continue Wellbutrin  mg q a m  Lorazepam q i d  P r n -decreased to 2-3 per day p r n  Requip 1 mg at HS  Discontinue Trazodone 100 mg HS p r n  due to inefficacy        Risks/Benefits      Risks, Benefits And Possible Side Effects Of Medications:    Risks, benefits, and possible side effects of medications explained to patient and patient verbalizes understanding and agreement for treatment      Controlled Medication Discussion:     Taking as prescribed    Psychotherapy Provided:     Individual psychotherapy provided: No

## 2019-07-09 ENCOUNTER — TELEPHONE (OUTPATIENT)
Dept: PSYCHIATRY | Facility: CLINIC | Age: 54
End: 2019-07-09

## 2019-07-09 NOTE — TELEPHONE ENCOUNTER
Miguelina called said she picked up her medications, she doesn't recall Oxcarbavepine 300mg as one of her medications and is wondering if the pharmacy made a mistake  Patient would like a call back        Contact# [phone]

## 2019-07-09 NOTE — TELEPHONE ENCOUNTER
Returned call and spoke with pt and reviewed her current meds which include lamictal, wellbutrin, requip, prn lorazepam and gabapentin

## 2019-07-18 DIAGNOSIS — F31.81 BIPOLAR 2 DISORDER (HCC): ICD-10-CM

## 2019-07-18 RX ORDER — LAMOTRIGINE 25 MG/1
TABLET ORAL
Qty: 60 TABLET | Refills: 1 | OUTPATIENT
Start: 2019-07-18

## 2019-08-15 DIAGNOSIS — F31.81 BIPOLAR 2 DISORDER (HCC): ICD-10-CM

## 2019-08-20 DIAGNOSIS — F31.81 BIPOLAR 2 DISORDER (HCC): ICD-10-CM

## 2019-08-20 RX ORDER — LORAZEPAM 1 MG/1
TABLET ORAL
Qty: 90 TABLET | Refills: 3 | Status: SHIPPED | OUTPATIENT
Start: 2019-08-20 | End: 2019-11-01 | Stop reason: SDUPTHER

## 2019-08-20 RX ORDER — LORAZEPAM 1 MG/1
TABLET ORAL
Qty: 120 TABLET | Refills: 3 | OUTPATIENT
Start: 2019-08-20

## 2019-09-04 DIAGNOSIS — M54.50 CHRONIC BILATERAL LOW BACK PAIN WITHOUT SCIATICA: ICD-10-CM

## 2019-09-04 DIAGNOSIS — G89.29 CHRONIC BILATERAL LOW BACK PAIN WITHOUT SCIATICA: ICD-10-CM

## 2019-09-04 RX ORDER — TIZANIDINE 4 MG/1
4 TABLET ORAL EVERY 8 HOURS PRN
Qty: 90 TABLET | Refills: 1 | Status: SHIPPED | OUTPATIENT
Start: 2019-09-04 | End: 2020-03-11

## 2019-09-04 NOTE — TELEPHONE ENCOUNTER
Pt requesting refill of Tizanidine 4mg    Said she is not able to get to the office until this month but has no appt

## 2019-09-12 ENCOUNTER — TELEPHONE (OUTPATIENT)
Dept: PSYCHIATRY | Facility: CLINIC | Age: 54
End: 2019-09-12

## 2019-09-12 DIAGNOSIS — F31.81 BIPOLAR 2 DISORDER (HCC): ICD-10-CM

## 2019-09-12 RX ORDER — BUPROPION HYDROCHLORIDE 150 MG/1
150 TABLET ORAL EVERY MORNING
Qty: 30 TABLET | Refills: 2 | Status: SHIPPED | OUTPATIENT
Start: 2019-09-12 | End: 2019-11-01

## 2019-09-12 RX ORDER — DULOXETIN HYDROCHLORIDE 30 MG/1
30 CAPSULE, DELAYED RELEASE ORAL DAILY
Qty: 30 CAPSULE | Refills: 2 | Status: SHIPPED | OUTPATIENT
Start: 2019-09-12 | End: 2019-11-01 | Stop reason: SDUPTHER

## 2019-09-12 NOTE — TELEPHONE ENCOUNTER
Thierry called asked if you can contact him regarding Miguelina's medication  The Neurontin she is still in pain and very emotional    Ghazal Sharp would also like to know why [facility] is not taking Cymbalta         Contact# [phone]

## 2019-09-12 NOTE — TELEPHONE ENCOUNTER
Spoke with patient's spouse  Patient stated that she felt better when she was taking the Cymbalta  This was discontinued in April after her hospital stay due to concerns for increasing mood lability  She had also been utilizing alcohol that time though  We will restart low-dose Cymbalta 30 mg daily but reduce her Wellbutrin to 150  she will call me if any issues before her appointment

## 2019-09-16 DIAGNOSIS — F31.81 BIPOLAR 2 DISORDER (HCC): ICD-10-CM

## 2019-09-16 RX ORDER — LORAZEPAM 1 MG/1
TABLET ORAL
Qty: 90 TABLET | Refills: 3 | OUTPATIENT
Start: 2019-09-16

## 2019-09-20 ENCOUNTER — TELEPHONE (OUTPATIENT)
Dept: FAMILY MEDICINE CLINIC | Facility: CLINIC | Age: 54
End: 2019-09-20

## 2019-09-20 NOTE — TELEPHONE ENCOUNTER
I spoke with pt she currently has no insurance for mammogram or colon screening   I did give her number to [facility] to at least schedule her mammogram  Pt did take down number and will call to schedule

## 2019-09-21 ENCOUNTER — TELEPHONE (OUTPATIENT)
Dept: OTHER | Facility: OTHER | Age: 54
End: 2019-09-21

## 2019-09-21 NOTE — TELEPHONE ENCOUNTER
Offered to look at schedule for appt today  Pt unable to be seen in office this am   She will be able to be seen at [address]       Health Call  Patient Name: Avis Cade  Gender: Female  : 1965  Age: 62 Y 4 M 11 D  Return Phone  Number: [phone] (Home)  Address: [address] [place] [address] [address]  City/State/Zip: [place] [address]  Practice Name: [address] [place] [address]  Practice Charged:  Physician:  [facility] Name:  Relationship To  Patient: Self  Return Phone Number: [phone] (Home)  Presenting Problem: "I woke up with a bladder infection "  Service Type: Triage  Charged Service 1: N/A  Pharmacy Name and  Number:  Nurse Assessment  Nurse: Fara Morris RN, Denise Date/Time: 2019 9:20:01 AM  Type of assessment required:  ---General (Adult or Child)  Duration of Current S/S  ---Today  Location/Radiation  ---Urinary  Temperature (F) and route:  ---98 7 (oral)  Symptom Specific Meds (Dose/Time):  ---None  Other S/S  ---Dysuria, hematuria, urgency/frequency  Unable to assess if back pain is worse due to  sciatica  "I have good days and bad days " Denies flank pain, N/V/D  Pain Scale on scale of 1-10, 10 being the worst:  ---4  Symptom progression:  ---worse  Intake and Output  ---Adequate see s/s  Protocol Title Nurse Date/Time  Urination Pain - Female TimerSTACY Dennison Harari 2019 9:21:34 AM  Question Caller Affirmed  Disp  Time Disposition Final User  2019 9:33:38 AM See Physician within 24 Hours STACY Stanley Dennison Harari  2019 9:34:15 AM RN Triaged Yes TimeSTACY pratt, [facility] Advice Given Per Protocol  SEE PHYSICIAN WITHIN 24 HOURS: REASSURANCE: This could be an urinary tract infection  You should see your PCP to be  examined and tested  FLUIDS: Drink extra fluids  Drink 8-10 glasses of liquids a day (Reason: to produce a dilute, non-irritating urine)  *  IF OFFICE WILL BE CLOSED AND NO PCP TRIAGE: You need to be seen within the next 24 hours   An urgent care center is often a  good source of care if your doctor's office is closed  CAUTION - FLUIDS: * Increased fluid intake may be contraindicated in adults with  renal failure or heart failure  * You can discuss this with your doctor  CRANBERRY JUICE: * Some people think that drinking cranberry  juice may help in fighting urinary tract infections  However, there is no good research that has ever proved this  * Dosage Cranberry Juice  Cocktail: 8 oz (240 ml) twice a day  * Dosage 100% Cranberry Juice: 1 oz (30 ml) twice a day  CAUTION - CRANBERRY JUICE: *  Do not drink more than 16 oz (480 ml) of cranberry juice cocktail per day (Reason: too much cranberry juice can also be irritating to the  bladder)  CARE ADVICE given per Urination Pain - Female (Adult) guideline  CALL BACK IF: * Fever or back pain occurs * You  become worse  WARM SALINE SITZ BATHS TO REDUCE PAIN: Sit in a warm saline bath for 20 minutes to cleanse the area and to  reduce pain  Add 2 oz  of table salt or baking soda to a tub of water    Caller Understands: Yes  Caller Disagree/Comply: Comply  PreDisposition: Unsure

## 2019-09-23 NOTE — TELEPHONE ENCOUNTER
Called pt to f/u  Pt states she did not got to urgent care  Offered appt for today, pt declined appt  States she no longer has UTI symproms but has been vomiting since 1:30 am  Again offered appt and pt declined , pt will go to urgent care if needed

## 2019-09-26 DIAGNOSIS — F31.62 BIPOLAR 1 DISORDER, MIXED, MODERATE (HCC): ICD-10-CM

## 2019-09-26 RX ORDER — GABAPENTIN 300 MG/1
CAPSULE ORAL
Qty: 120 CAPSULE | Refills: 2 | Status: SHIPPED | OUTPATIENT
Start: 2019-09-26 | End: 2019-11-01 | Stop reason: SDUPTHER

## 2019-10-01 DIAGNOSIS — F31.81 BIPOLAR 2 DISORDER (HCC): ICD-10-CM

## 2019-10-02 ENCOUNTER — OFFICE VISIT (OUTPATIENT)
Dept: FAMILY MEDICINE CLINIC | Facility: CLINIC | Age: 54
End: 2019-10-02

## 2019-10-02 VITALS
SYSTOLIC BLOOD PRESSURE: 124 MMHG | RESPIRATION RATE: 16 BRPM | WEIGHT: 195 LBS | HEIGHT: 67 IN | BODY MASS INDEX: 30.61 KG/M2 | TEMPERATURE: 98.6 F | OXYGEN SATURATION: 97 % | HEART RATE: 105 BPM | DIASTOLIC BLOOD PRESSURE: 84 MMHG

## 2019-10-02 DIAGNOSIS — Z12.11 COLON CANCER SCREENING: ICD-10-CM

## 2019-10-02 DIAGNOSIS — Z12.39 BREAST CANCER SCREENING: ICD-10-CM

## 2019-10-02 DIAGNOSIS — M54.50 CHRONIC BILATERAL LOW BACK PAIN WITHOUT SCIATICA: Primary | ICD-10-CM

## 2019-10-02 DIAGNOSIS — G89.29 CHRONIC BILATERAL LOW BACK PAIN WITHOUT SCIATICA: Primary | ICD-10-CM

## 2019-10-02 LAB
SL AMB  POCT GLUCOSE, UA: ABNORMAL
SL AMB LEUKOCYTE ESTERASE,UA: ABNORMAL
SL AMB POCT BILIRUBIN,UA: ABNORMAL
SL AMB POCT BLOOD,UA: ABNORMAL
SL AMB POCT CLARITY,UA: CLEAR
SL AMB POCT COLOR,UA: YELLOW
SL AMB POCT KETONES,UA: ABNORMAL
SL AMB POCT NITRITE,UA: ABNORMAL
SL AMB POCT PH,UA: 7
SL AMB POCT SPECIFIC GRAVITY,UA: 1
SL AMB POCT URINE PROTEIN: ABNORMAL
SL AMB POCT UROBILINOGEN: ABNORMAL

## 2019-10-02 PROCEDURE — 81002 URINALYSIS NONAUTO W/O SCOPE: CPT | Performed by: PHYSICIAN ASSISTANT

## 2019-10-02 PROCEDURE — 99213 OFFICE O/P EST LOW 20 MIN: CPT | Performed by: PHYSICIAN ASSISTANT

## 2019-10-02 RX ORDER — LAMOTRIGINE 150 MG/1
TABLET ORAL
Qty: 30 TABLET | Refills: 2 | Status: SHIPPED | OUTPATIENT
Start: 2019-10-02 | End: 2019-11-01 | Stop reason: SDUPTHER

## 2019-10-02 NOTE — PROGRESS NOTES
Assessment/Plan:    Urine dip done today is negative except for mild ketones  I did advise her on the importance of doing daily stretches with regards to her chronic back pain  I did recommend she take the tizanidine only as needed  Recommend heat 3 times daily for 10 minutes to low back    Patient Instructions   Recommend calling the [facility] at [phone] for information about free breast cancer screening    Check online for [facility]  for the price for the fit test/stool check for colon cancer screen  The sample would need to be taken to a [email or web address] so you could pay the cheaper price at the time of registration      M*Modal software was used to dictate this note  It may contain errors with dictating incorrect words/spelling  Please contact provider directly for any questions  BMI Counseling: Body mass index is 30 54 kg/m²  The BMI is above normal  Nutrition recommendations include reducing portion sizes and decreasing overall calorie intake  Exercise recommendations include exercising 3-5 times per week  Diagnoses and all orders for this visit:    Chronic bilateral low back pain without sciatica    Breast cancer screening  -     Mammo screening bilateral w cad; Future    Colon cancer screening  -     Occult Blood, Fecal Immunochemical; Future          Subjective:      Patient ID: Fernando Mills is a 62 y o  female  Patient presents today for her chronic low back pain  She does take tizanidine regularly every 8 hours for her chronic pain  She states she did have back surgery in the past   She has been noticing some more soreness over the last couple a days  She was unsure if it was related to a possible urinary tract infection  She denies any mid back pain  She does notice a little bit of pressure above the bladder  She denies any dysuria, increased urinary frequency or blood in the urine, nausea, vomiting, fever, chills    She does not do any type of home stretches  She states that exercise has made her symptoms worse  The following portions of the patient's history were reviewed and updated as appropriate:   She  has a past medical history of Anxiety, Depression, Ovarian cyst, and Urinary incontinence  She   Patient Active Problem List    Diagnosis Date Noted    Breast cancer screening 10/02/2019    Colon cancer screening 10/02/2019    Cough with expectoration 04/16/2019    Chronic bilateral low back pain without sciatica 04/16/2019    Cellulitis of right lower extremity 09/26/2018    Stress incontinence of urine 03/20/2018    Bipolar 1 disorder ([facility] ) 08/28/2015    Major depression, recurrent ([facility] ) 04/24/2015    Fibromyalgia 04/24/2015     She  has a past surgical history that includes Cystoscopy (03/13/2018); Back surgery; Cervical spine surgery; pr lap,sling operation (N/A, 4/24/2018); and Oophorectomy (Right)  Her family history includes No Known Problems in her father and mother  She  reports that she has been smoking cigarettes  She has been smoking about 0 50 packs per day  She has never used smokeless tobacco  She reports that she drinks alcohol  She reports that she does not use drugs    Current Outpatient Medications   Medication Sig Dispense Refill    buPROPion (WELLBUTRIN XL) 150 mg 24 hr tablet Take 1 tablet (150 mg total) by mouth every morning 30 tablet 2    DULoxetine (CYMBALTA) 30 mg delayed release capsule Take 1 capsule (30 mg total) by mouth daily 30 capsule 2    gabapentin (NEURONTIN) 300 mg capsule TAKE ONE CAPSULE BY MOUTH TWICE A DAY AND TAKE TWO CAPSULES BY MOUTH AT BEDTIME 120 capsule 2    lamoTRIgine (LaMICtal) 150 MG tablet TAKE ONE TABLET BY MOUTH AT BEDTIME 30 tablet 2    LORazepam (ATIVAN) 1 mg tablet one tablet as needed 2 to 3 times daily 90 tablet 3    naproxen (NAPROSYN) 500 mg tablet Take 1 tablet (500 mg total) by mouth 2 (two) times a day with meals 60 tablet 1    OXcarbazepine (TRILEPTAL) 300 mg tablet Take 300 mg by mouth 3 (three) times a day  1    rOPINIRole (REQUIP) 1 mg tablet Take 1 tablet (1 mg total) by mouth daily at bedtime 30 tablet 2    tiZANidine (ZANAFLEX) 4 mg tablet Take 1 tablet (4 mg total) by mouth every 8 (eight) hours as needed for muscle spasms 90 tablet 1    traZODone (DESYREL) 100 mg tablet Take 100 mg by mouth daily at bedtime  3     No current facility-administered medications for this visit  Current Outpatient Medications on File Prior to Visit   Medication Sig    buPROPion (WELLBUTRIN XL) 150 mg 24 hr tablet Take 1 tablet (150 mg total) by mouth every morning    DULoxetine (CYMBALTA) 30 mg delayed release capsule Take 1 capsule (30 mg total) by mouth daily    gabapentin (NEURONTIN) 300 mg capsule TAKE ONE CAPSULE BY MOUTH TWICE A DAY AND TAKE TWO CAPSULES BY MOUTH AT BEDTIME    lamoTRIgine (LaMICtal) 150 MG tablet TAKE ONE TABLET BY MOUTH AT BEDTIME    LORazepam (ATIVAN) 1 mg tablet one tablet as needed 2 to 3 times daily    naproxen (NAPROSYN) 500 mg tablet Take 1 tablet (500 mg total) by mouth 2 (two) times a day with meals    OXcarbazepine (TRILEPTAL) 300 mg tablet Take 300 mg by mouth 3 (three) times a day    rOPINIRole (REQUIP) 1 mg tablet Take 1 tablet (1 mg total) by mouth daily at bedtime    tiZANidine (ZANAFLEX) 4 mg tablet Take 1 tablet (4 mg total) by mouth every 8 (eight) hours as needed for muscle spasms    traZODone (DESYREL) 100 mg tablet Take 100 mg by mouth daily at bedtime     No current facility-administered medications on file prior to visit  She is allergic to azithromycin and erythromycin       Review of Systems   Constitutional: Negative for chills and fever     Genitourinary:        As stated in HPI   Musculoskeletal:        As stated in HPI         Objective:      /84 (BP Location: Left arm, Patient Position: Sitting, Cuff Size: Large)   Pulse 105   Temp 98 6 °F (37 °C) (Tympanic)   Resp 16   Ht 5' 7" (1 702 m)   Wt 88 5 kg (195 lb)   SpO2 97%   BMI 30 54 kg/m²          Physical Exam   Constitutional: She appears well-developed and well-nourished  No distress  Patient examined in a chair because she states that she is not able to get up on the exam table because of her back pain  HENT:   Head: Normocephalic and atraumatic  Neck: Neck supple  Cardiovascular: Normal rate, regular rhythm and normal heart sounds  No murmur heard  Pulmonary/Chest: Effort normal and breath sounds normal  No respiratory distress  She has no wheezes  She has no rales  Abdominal: Soft  Bowel sounds are normal  There is no tenderness  No CVA tenderness bilaterally   Lymphadenopathy:     She has no cervical adenopathy

## 2019-10-02 NOTE — PATIENT INSTRUCTIONS
Recommend calling the [facility] at [phone] for information about free breast cancer screening    Check online for [facility]  for the price for the fit test/stool check for colon cancer screen    The sample would need to be taken to a [facility] so you could pay the cheaper price at the time of registration

## 2019-10-13 DIAGNOSIS — F31.81 BIPOLAR 2 DISORDER (HCC): ICD-10-CM

## 2019-10-15 RX ORDER — ROPINIROLE 1 MG/1
TABLET, FILM COATED ORAL
Qty: 30 TABLET | Refills: 2 | Status: SHIPPED | OUTPATIENT
Start: 2019-10-15 | End: 2019-11-01 | Stop reason: SDUPTHER

## 2019-11-01 ENCOUNTER — OFFICE VISIT (OUTPATIENT)
Dept: PSYCHIATRY | Facility: CLINIC | Age: 54
End: 2019-11-01

## 2019-11-01 VITALS — RESPIRATION RATE: 18 BRPM | BODY MASS INDEX: 30.61 KG/M2 | WEIGHT: 195 LBS | HEIGHT: 67 IN

## 2019-11-01 DIAGNOSIS — F31.62 BIPOLAR 1 DISORDER, MIXED, MODERATE (HCC): Primary | ICD-10-CM

## 2019-11-01 DIAGNOSIS — F31.81 BIPOLAR 2 DISORDER (HCC): ICD-10-CM

## 2019-11-01 DIAGNOSIS — F10.11 HISTORY OF ALCOHOL ABUSE: ICD-10-CM

## 2019-11-01 DIAGNOSIS — F51.01 PRIMARY INSOMNIA: ICD-10-CM

## 2019-11-01 PROCEDURE — 99213 OFFICE O/P EST LOW 20 MIN: CPT | Performed by: PHYSICIAN ASSISTANT

## 2019-11-01 RX ORDER — LORAZEPAM 1 MG/1
TABLET ORAL
Qty: 90 TABLET | Refills: 3 | Status: SHIPPED | OUTPATIENT
Start: 2019-11-01 | End: 2019-11-04 | Stop reason: SDUPTHER

## 2019-11-01 RX ORDER — ROPINIROLE 1 MG/1
TABLET, FILM COATED ORAL
Qty: 60 TABLET | Refills: 3 | Status: SHIPPED | OUTPATIENT
Start: 2019-11-01 | End: 2020-02-05

## 2019-11-01 RX ORDER — LAMOTRIGINE 200 MG/1
TABLET ORAL
Qty: 30 TABLET | Refills: 3 | Status: SHIPPED | OUTPATIENT
Start: 2019-11-01 | End: 2020-02-12 | Stop reason: SDUPTHER

## 2019-11-01 RX ORDER — DULOXETIN HYDROCHLORIDE 60 MG/1
60 CAPSULE, DELAYED RELEASE ORAL DAILY
Qty: 30 CAPSULE | Refills: 3 | Status: SHIPPED | OUTPATIENT
Start: 2019-11-01 | End: 2020-01-14

## 2019-11-01 RX ORDER — GABAPENTIN 400 MG/1
CAPSULE ORAL
Qty: 90 CAPSULE | Refills: 3 | Status: SHIPPED | OUTPATIENT
Start: 2019-11-01 | End: 2019-12-18

## 2019-11-01 NOTE — PSYCH
PROGRESS NOTE        [facility]      Name and Date of Birth:  Diego Acevedo 62 y o  1965    Date of Visit: 11/01/19    SUBJECTIVE:  Patient seen for follow-up of bipolar disorder and anxiety  Patient reports that overall she is feeling better with her moods  Decreased depression anxiety  Continues with chronic pain from fibromyalgia which had been quite exacerbated for the past few weeks  States that since being back on the Cymbalta this has been helpful  She does continue to report disrupted sleep pattern  States that she will be awake for two nights and then she will sleep  She reports that her irritability is decreased however  She reports that she has had a good supportive relationship with her spouse  States that her and her stepdaughter made amends as well and her stepdaughter has since moved out though  Denies any adverse effects with her medications, no rash  Overall she does appear to be much brighter  States that she has not had any alcohol in some time  She denies suicidal ideation, intent or plan at present, has no suicidal ideation, intent or plan at present  She denies any auditory hallucinations and visual hallucinations, denies any other delusional thinking, denies any delusional thinking  She denies any side effects from medications    HPI ROS Appetite Changes and Sleep: normal appetite, decreased sleep    Review Of Systems:      Constitutional Negative   ENT Negative   Cardiovascular Negative   Respiratory Negative   Gastrointestinal Negative   Genitourinary Negative   Musculoskeletal Chronic fibromyalgia pain   Integumentary Negative   Neurological Negative   Endocrine Negative   Other Symptoms Negative and None       Laboratory Results: No results found for this or any previous visit      Substance Abuse History:    Social History     Substance and Sexual Activity   Drug Use No       Family Psychiatric History: Family History   Problem Relation Age of Onset    No Known Problems Father     No Known Problems Mother        The following portions of the patient's history were reviewed and updated as appropriate: past family history, past medical history, past social history, past surgical history and problem list     Social History     Socioeconomic History    Marital status: /Civil Union     Spouse name: Not on file    Number of children: Not on file    Years of education: Not on file    Highest education level: Not on file   Occupational History    Not on file   Social Needs    Financial resource strain: Not on file    Food insecurity:     Worry: Not on file     Inability: Not on file    Transportation needs:     Medical: Not on file     Non-medical: Not on file   Tobacco Use    Smoking status: Current Every Day Smoker     Packs/day: 0 50     Types: Cigarettes    Smokeless tobacco: Never Used   Substance and Sexual Activity    Alcohol use: Yes     Comment: social     Drug use: No    Sexual activity: Not on file   Lifestyle    Physical activity:     Days per week: Not on file     Minutes per session: Not on file    Stress: Not on file   Relationships    Social connections:     Talks on phone: Not on file     Gets together: Not on file     Attends Gnosticism service: Not on file     Active member of club or organization: Not on file     Attends meetings of clubs or organizations: Not on file     Relationship status: Not on file    Intimate partner violence:     Fear of current or ex partner: Not on file     Emotionally abused: Not on file     Physically abused: Not on file     Forced sexual activity: Not on file   Other Topics Concern    Not on file   Social History Narrative    Not on file     Social History     Social History Narrative    Not on file        Social History     Tobacco History     Smoking Status  Current Every Day Smoker Smoking Frequency  0 5 packs/day Smoking Tobacco Type  Cigarettes    Smokeless Tobacco Use  Never Used          Alcohol History     Alcohol Use Status  Yes Comment  social           Drug Use     Drug Use Status  No          Sexual Activity     Sexually Active  Not Asked          Activities of Daily Living    Not Asked                     OBJECTIVE:     Mental Status Evaluation:    Appearance age appropriate, casually dressed   Behavior pleasant, cooperative   Speech normal volume, normal pitch   Mood Euthymic   Affect Congruent   Thought Processes logical   Associations intact associations   Thought Content normal   Perceptual Disturbances: none   Abnormal Thoughts  Risk Potential Suicidal ideation - None  Homicidal ideation - None  Potential for aggression - No   Orientation oriented to person, place, time/date and situation   Memory recent and remote memory grossly intact   Cosciousness alert and awake   Attention Span attention span and concentration are age appropriate   Intellect Appears to be of Average Intelligence   Insight age appropriate    Judgement good    Muscle Strength and  Gait muscle strength and tone were normal   Language no difficulty naming common objects   Fund of Knowledge displays adequate knowledge of current events   Pain Moderate   Pain Scale Pain       Assessment/Plan:       Diagnoses and all orders for this visit:    Bipolar 1 disorder, mixed, moderate (HCC)  -     gabapentin (NEURONTIN) 400 mg capsule; TAKE ONE CAPSULE BY MOUTH TWICE A DAY AND TAKE TWO CAPSULES BY MOUTH AT BEDTIME    Primary insomnia    History of alcohol abuse    Bipolar 2 disorder (HCC)  -     DULoxetine (CYMBALTA) 60 mg delayed release capsule;  Take 1 capsule (60 mg total) by mouth daily  -     lamoTRIgine (LaMICtal) 200 MG tablet; TAKE ONE TABLET BY MOUTH AT BEDTIME  -     LORazepam (ATIVAN) 1 mg tablet; 1/2 - 1 tablet as needed 2 to 3 times daily  -     rOPINIRole (REQUIP) 1 mg tablet; 1 po bid          Treatment Recommendations/Precautions:  Increase Lamictal to 200 mg at bedtime  Discussed potential side effect of rash  Continue with lorazepam but try to reduce to half to one tablet to 3 times a day p r n  Increase Requip to 1 mg b i d  Increase Cymbalta to 60 mg daily  Discontinue Wellbutrin  mg q a m  Increase gabapentin to 400 mg b i d  And 800 at bedtime  Follow-up three months        Risks/Benefits      Risks, Benefits And Possible Side Effects Of Medications:    Risks, benefits, and possible side effects of medications explained to patient and patient verbalizes understanding and agreement for treatment      Controlled Medication Discussion:     Taking as prescribed    Psychotherapy Provided:     Individual psychotherapy provided: No

## 2019-11-04 ENCOUNTER — TELEPHONE (OUTPATIENT)
Dept: PSYCHIATRY | Facility: CLINIC | Age: 54
End: 2019-11-04

## 2019-11-04 DIAGNOSIS — F31.81 BIPOLAR 2 DISORDER (HCC): ICD-10-CM

## 2019-11-04 RX ORDER — LORAZEPAM 1 MG/1
TABLET ORAL
Qty: 90 TABLET | Refills: 3 | Status: SHIPPED | OUTPATIENT
Start: 2019-11-04 | End: 2019-12-18

## 2019-11-04 NOTE — TELEPHONE ENCOUNTER
Miguelina called on 11/1/19 to explain that all of her recent prescriptions sent to [address] [place] [address] were to expensive, so she was able to have them transferred to [facility] pharmacy in [place]  Except for the ativan   [facility] pharmacy in [place] is requesting a new script for ativan sent electronically  Then they'll fill it for [facility]

## 2019-12-04 ENCOUNTER — TELEPHONE (OUTPATIENT)
Dept: PSYCHIATRY | Facility: CLINIC | Age: 54
End: 2019-12-04

## 2019-12-10 ENCOUNTER — TELEPHONE (OUTPATIENT)
Dept: PSYCHIATRY | Facility: CLINIC | Age: 54
End: 2019-12-10

## 2019-12-10 NOTE — TELEPHONE ENCOUNTER
Miguelina called said that she stopped taking the Ativan and Neurontin and she can't stop sleeping  Also her moods are, talkative, happy and blah  Patient would like a call back        Upcoming appt 2/5/20    Contact# [phone]

## 2019-12-11 NOTE — TELEPHONE ENCOUNTER
Spoke with patient she will take Cymbalta 60 mg at bedtime, Requip 2 mg at bedtime, Lamictal 200 mg at bedtime she will call back if any further issues

## 2019-12-12 DIAGNOSIS — F31.81 BIPOLAR 2 DISORDER (HCC): ICD-10-CM

## 2019-12-12 RX ORDER — BUPROPION HYDROCHLORIDE 150 MG/1
TABLET ORAL
Qty: 30 TABLET | Refills: 2 | OUTPATIENT
Start: 2019-12-12

## 2019-12-17 ENCOUNTER — TELEPHONE (OUTPATIENT)
Dept: PSYCHIATRY | Facility: CLINIC | Age: 54
End: 2019-12-17

## 2019-12-18 DIAGNOSIS — F31.81 BIPOLAR 2 DISORDER (HCC): Primary | ICD-10-CM

## 2019-12-18 RX ORDER — BUPROPION HYDROCHLORIDE 150 MG/1
150 TABLET ORAL EVERY MORNING
Qty: 30 TABLET | Refills: 2 | Status: SHIPPED | OUTPATIENT
Start: 2019-12-18 | End: 2020-02-12 | Stop reason: SDUPTHER

## 2019-12-18 NOTE — TELEPHONE ENCOUNTER
Spoke with Swetha Iglesias who reports that she is going to bed at night sleep about 4 hours then awake that she will go back to bed for a few hours and has been not having any motivation accomplishing anything  She does deny feeling overly depressed or irritable  Denies any recent mood swings  States that she has been getting along well with her family  We will restart the patient on Wellbutrin  mg the morning and continue with the lamotrigine 200 mg at night, Cymbalta 60 mg at night the Requip 2 mg at night    she will call back if any issues

## 2020-01-14 DIAGNOSIS — F31.81 BIPOLAR 2 DISORDER (HCC): ICD-10-CM

## 2020-01-14 RX ORDER — DULOXETIN HYDROCHLORIDE 60 MG/1
CAPSULE, DELAYED RELEASE ORAL
Qty: 30 CAPSULE | Refills: 0 | Status: SHIPPED | OUTPATIENT
Start: 2020-01-14 | End: 2020-02-12 | Stop reason: SDUPTHER

## 2020-01-21 ENCOUNTER — TELEPHONE (OUTPATIENT)
Dept: PSYCHIATRY | Facility: CLINIC | Age: 55
End: 2020-01-21

## 2020-01-21 NOTE — TELEPHONE ENCOUNTER
Puneet White has an appointment on 2/5/20  She called on 1/13/20 to see if something else can be prescribed to help with her "bad nerves"     Would you like me to call her to let her know this can be discussed at her next appointment? ?

## 2020-01-22 NOTE — TELEPHONE ENCOUNTER
Her number is now not in service  So if she calls me back from an alternate number I'll let her know what to do

## 2020-01-23 ENCOUNTER — TELEPHONE (OUTPATIENT)
Dept: PSYCHIATRY | Facility: CLINIC | Age: 55
End: 2020-01-23

## 2020-01-23 NOTE — TELEPHONE ENCOUNTER
Spoke with pt who has been having increased anxiety and stressors related to daughter  Continues same meds and discussed/reinforced coping skills  She will call sooner if any concerns prior or her appt

## 2020-01-23 NOTE — TELEPHONE ENCOUNTER
Miguelina called said she is having an extreme personal situation and would like to speak with you        Phone number was updated to [phone]

## 2020-02-04 DIAGNOSIS — F31.81 BIPOLAR 2 DISORDER (HCC): ICD-10-CM

## 2020-02-05 RX ORDER — ROPINIROLE 1 MG/1
2 TABLET, FILM COATED ORAL
Qty: 60 TABLET | Refills: 1 | Status: SHIPPED | OUTPATIENT
Start: 2020-02-05 | End: 2020-02-12 | Stop reason: SDUPTHER

## 2020-02-12 ENCOUNTER — OFFICE VISIT (OUTPATIENT)
Dept: PSYCHIATRY | Facility: CLINIC | Age: 55
End: 2020-02-12

## 2020-02-12 DIAGNOSIS — F31.81 BIPOLAR 2 DISORDER (HCC): ICD-10-CM

## 2020-02-12 DIAGNOSIS — F41.9 ANXIETY: ICD-10-CM

## 2020-02-12 DIAGNOSIS — F10.11 HISTORY OF ALCOHOL ABUSE: ICD-10-CM

## 2020-02-12 DIAGNOSIS — F31.62 BIPOLAR 1 DISORDER, MIXED, MODERATE (HCC): Primary | ICD-10-CM

## 2020-02-12 DIAGNOSIS — F51.01 PRIMARY INSOMNIA: ICD-10-CM

## 2020-02-12 PROCEDURE — 99213 OFFICE O/P EST LOW 20 MIN: CPT | Performed by: PHYSICIAN ASSISTANT

## 2020-02-12 RX ORDER — ROPINIROLE 1 MG/1
2 TABLET, FILM COATED ORAL
Qty: 60 TABLET | Refills: 3 | Status: SHIPPED | OUTPATIENT
Start: 2020-02-12 | End: 2020-04-03 | Stop reason: SDUPTHER

## 2020-02-12 RX ORDER — BUPROPION HYDROCHLORIDE 150 MG/1
150 TABLET ORAL EVERY MORNING
Qty: 30 TABLET | Refills: 3 | Status: SHIPPED | OUTPATIENT
Start: 2020-02-12 | End: 2020-04-29 | Stop reason: SDUPTHER

## 2020-02-12 RX ORDER — LAMOTRIGINE 200 MG/1
TABLET ORAL
Qty: 30 TABLET | Refills: 3 | Status: SHIPPED | OUTPATIENT
Start: 2020-02-12 | End: 2020-04-29 | Stop reason: SDUPTHER

## 2020-02-12 RX ORDER — DULOXETIN HYDROCHLORIDE 30 MG/1
30 CAPSULE, DELAYED RELEASE ORAL DAILY
Qty: 30 CAPSULE | Refills: 3 | Status: SHIPPED | OUTPATIENT
Start: 2020-02-12 | End: 2020-04-29 | Stop reason: SDUPTHER

## 2020-02-12 RX ORDER — LAMOTRIGINE 25 MG/1
TABLET ORAL
Qty: 60 TABLET | Refills: 3 | Status: SHIPPED | OUTPATIENT
Start: 2020-02-12 | End: 2020-04-29 | Stop reason: SDUPTHER

## 2020-02-12 NOTE — PSYCH
PROGRESS NOTE        [facility]      Name and Date of Birth:  Trenton Moreno 62 y o  1965    Date of Visit: 02/12/20    SUBJECTIVE:  Pt seen for follow up Bipolar disorder, anxiety and med check  Pt continues with stressors with her daughter and the dog  States that she has been taking care of the dog for 8 and 1/2 years and now the daughter wants to take the dog back  Her daughter now lives in [place]  She has been more irritable than usual and bothered by everything  States she is preoccupied with things from that weekend  States she has been maintaining her sobriety  She is sleeping better lately  Appetite is good  No SI or HI  Spouse is good support  She denies suicidal ideation, intent or plan at present, has no suicidal ideation, intent or plan at present  She denies any auditory hallucinations and visual hallucinations, denies any other delusional thinking, denies any delusional thinking  She denies any side effects from medications    HPI ROS Appetite Changes and Sleep: normal appetite, normal sleep    Review Of Systems:      Constitutional Negative   ENT Negative   Cardiovascular Negative   Respiratory Negative   Gastrointestinal Negative   Genitourinary Negative   Musculoskeletal Negative   Integumentary Negative   Neurological Negative   Endocrine Negative   Other Symptoms Negative and None       Laboratory Results: No results found for this or any previous visit      Substance Abuse History:    Social History     Substance and Sexual Activity   Drug Use No       Family Psychiatric History:     Family History   Problem Relation Age of Onset    No Known Problems Father     No Known Problems Mother        The following portions of the patient's history were reviewed and updated as appropriate: past family history, past medical history, past social history, past surgical history and problem list     Social History Socioeconomic History    Marital status: /Civil Union     Spouse name: Not on file    Number of children: Not on file    Years of education: Not on file    Highest education level: Not on file   Occupational History    Not on file   Social Needs    Financial resource strain: Not on file    Food insecurity:     Worry: Not on file     Inability: Not on file    Transportation needs:     Medical: Not on file     Non-medical: Not on file   Tobacco Use    Smoking status: Current Every Day Smoker     Packs/day: 0 50     Types: Cigarettes    Smokeless tobacco: Never Used   Substance and Sexual Activity    Alcohol use: Yes     Comment: social     Drug use: No    Sexual activity: Not on file   Lifestyle    Physical activity:     Days per week: Not on file     Minutes per session: Not on file    Stress: Not on file   Relationships    Social connections:     Talks on phone: Not on file     Gets together: Not on file     Attends Islam service: Not on file     Active member of club or organization: Not on file     Attends meetings of clubs or organizations: Not on file     Relationship status: Not on file    Intimate partner violence:     Fear of current or ex partner: Not on file     Emotionally abused: Not on file     Physically abused: Not on file     Forced sexual activity: Not on file   Other Topics Concern    Not on file   Social History Narrative    Not on file     Social History     Social History Narrative    Not on file        Social History     Tobacco History     Smoking Status  Current Every Day Smoker Smoking Frequency  0 5 packs/day Smoking Tobacco Type  Cigarettes    Smokeless Tobacco Use  Never Used          Alcohol History     Alcohol Use Status  Yes Comment  social           Drug Use     Drug Use Status  No          Sexual Activity     Sexually Active  Not Asked          Activities of Daily Living    Not Asked                     OBJECTIVE:     Mental Status Evaluation:    Appearance age appropriate, casually dressed   Behavior Good eye contact, cooperative   Speech normal volume, normal pitch   Mood labile   Affect labile   Thought Processes logical   Associations intact associations   Thought Content normal   Perceptual Disturbances: none   Abnormal Thoughts  Risk Potential Suicidal ideation - None  Homicidal ideation - None  Potential for aggression - No   Orientation oriented to person, place, time/date and situation   Memory recent and remote memory grossly intact   Cosciousness alert and awake   Attention Span attention span and concentration are age appropriate   Intellect Appears to be of Average Intelligence   Insight age appropriate    Judgement good    Muscle Strength and  Gait muscle strength and tone were normal   Language no difficulty naming common objects   Fund of Knowledge displays adequate knowledge of current events   Pain none   Pain Scale 0       Assessment/Plan:       Diagnoses and all orders for this visit:    Bipolar 1 disorder, mixed, moderate (HCC)  -     lamoTRIgine (LaMICtal) 25 mg tablet; 1 po qam x 14 days then 2 po q qam    Primary insomnia    History of alcohol abuse    Anxiety    Bipolar 2 disorder ([facility])  -     DULoxetine (CYMBALTA) 30 mg delayed release capsule; Take 1 capsule (30 mg total) by mouth daily  -     buPROPion (WELLBUTRIN XL) 150 mg 24 hr tablet; Take 1 tablet (150 mg total) by mouth every morning  -     lamoTRIgine (LaMICtal) 200 MG tablet; TAKE ONE TABLET BY MOUTH AT BEDTIME  -     rOPINIRole (REQUIP) 1 mg tablet; Take 2 tablets (2 mg total) by mouth daily at bedtime          Treatment Recommendations/Precautions:   Wellbutrin  qam  Cymbalta 60 mg po qd-decrease to 30 mg po qd  Lamictal 200 mg po qhs  Add Lamictal 25 mg po qd x 14 days then 2 po qam  Requip 2 mg po qhs    Continue current medication    Risks/Benefits      Risks, Benefits And Possible Side Effects Of Medications:    Risks, benefits, and possible side effects of medications explained to patient and patient verbalizes understanding and agreement for treatment      Controlled Medication Discussion:     Not applicable    Psychotherapy Provided:     Individual psychotherapy provided: No

## 2020-02-13 DIAGNOSIS — F31.81 BIPOLAR 2 DISORDER (HCC): ICD-10-CM

## 2020-02-13 RX ORDER — DULOXETIN HYDROCHLORIDE 60 MG/1
CAPSULE, DELAYED RELEASE ORAL
Qty: 30 CAPSULE | Refills: 0 | OUTPATIENT
Start: 2020-02-13

## 2020-02-13 RX ORDER — ROPINIROLE 1 MG/1
TABLET, FILM COATED ORAL
Qty: 60 TABLET | Refills: 2 | OUTPATIENT
Start: 2020-02-13

## 2020-02-20 ENCOUNTER — DOCUMENTATION (OUTPATIENT)
Dept: PSYCHIATRY | Facility: CLINIC | Age: 55
End: 2020-02-20

## 2020-02-20 NOTE — PROGRESS NOTES
Treatment Plan Tracking    Treatment Plan not completed within required time limits due to: client did not schedule an appointment within 120 days when next treatment plan was due November 2018

## 2020-03-11 ENCOUNTER — OFFICE VISIT (OUTPATIENT)
Dept: PSYCHIATRY | Facility: CLINIC | Age: 55
End: 2020-03-11

## 2020-03-11 VITALS — RESPIRATION RATE: 18 BRPM | BODY MASS INDEX: 30.61 KG/M2 | WEIGHT: 195 LBS | HEIGHT: 67 IN

## 2020-03-11 DIAGNOSIS — F31.81 BIPOLAR 2 DISORDER (HCC): Primary | ICD-10-CM

## 2020-03-11 DIAGNOSIS — F51.01 PRIMARY INSOMNIA: ICD-10-CM

## 2020-03-11 DIAGNOSIS — F41.9 ANXIETY: ICD-10-CM

## 2020-03-11 DIAGNOSIS — F10.11 HISTORY OF ALCOHOL ABUSE: ICD-10-CM

## 2020-03-11 PROCEDURE — 99213 OFFICE O/P EST LOW 20 MIN: CPT | Performed by: PHYSICIAN ASSISTANT

## 2020-03-11 PROCEDURE — 3008F BODY MASS INDEX DOCD: CPT | Performed by: PHYSICIAN ASSISTANT

## 2020-03-11 NOTE — PSYCH
PROGRESS NOTE        [facility]      Name and Date of Birth:  Stalin Larkin 62 y o  1965    Date of Visit: 03/11/20    SUBJECTIVE:  Patient seen for follow-up of bipolar disorder, anxiety and medication check  Pt seen accompanied by spouse  Overall she is doing better in handling things better than her last visit  She sent an email to her daughter but has not heard back  Feels better that she was able to say which she wanted and states that she is not anticipating hearing back from her daughter soon but left the email open-ended that her daughter may contact her if she desires  She is maintaining her sobriety and doing well with that  She has lost 15 pounds and doing well with watching diet  She is also more physically active now and getting off the couch more"  No SE with lamictal   Continues with residual anxiety but this has also been better with the increase  Spouse notes stressors as well at home  They currently reside with their other 20-year-old daughter and son  There son is looking for house and planning on moving out which would mean they would not be able to financially afford the house anymore and need to downsize     There daughter then would also have to move  Discussed stressors with that  Overall spouse notes that she has been handling things and refraining from alcohol  She denies suicidal ideation, intent or plan at present, has no suicidal ideation, intent or plan at present  She denies any auditory hallucinations and visual hallucinations, denies any other delusional thinking, denies any delusional thinking  She denies any side effects from medications      HPI ROS Appetite Changes and Sleep: normal appetite, normal sleep    Review Of Systems:      Constitutional Negative   ENT Negative   Cardiovascular Negative   Respiratory Negative   Gastrointestinal Negative   Genitourinary Negative   Musculoskeletal Negative Integumentary Negative   Neurological Negative   Endocrine Negative   Other Symptoms Negative and None       Laboratory Results: No results found for this or any previous visit      Substance Abuse History:    Social History     Substance and Sexual Activity   Drug Use No       Family Psychiatric History:     Family History   Problem Relation Age of Onset    No Known Problems Father     No Known Problems Mother        The following portions of the patient's history were reviewed and updated as appropriate: past family history, past medical history, past social history, past surgical history and problem list     Social History     Socioeconomic History    Marital status: /Civil Union     Spouse name: Not on file    Number of children: Not on file    Years of education: Not on file    Highest education level: Not on file   Occupational History    Not on file   Social Needs    Financial resource strain: Not on file    Food insecurity:     Worry: Not on file     Inability: Not on file    Transportation needs:     Medical: Not on file     Non-medical: Not on file   Tobacco Use    Smoking status: Current Every Day Smoker     Packs/day: 0 50     Types: Cigarettes    Smokeless tobacco: Never Used   Substance and Sexual Activity    Alcohol use: Yes     Comment: social     Drug use: No    Sexual activity: Not on file   Lifestyle    Physical activity:     Days per week: Not on file     Minutes per session: Not on file    Stress: Not on file   Relationships    Social connections:     Talks on phone: Not on file     Gets together: Not on file     Attends Christianity service: Not on file     Active member of club or organization: Not on file     Attends meetings of clubs or organizations: Not on file     Relationship status: Not on file    Intimate partner violence:     Fear of current or ex partner: Not on file     Emotionally abused: Not on file     Physically abused: Not on file     Forced sexual activity: Not on file   Other Topics Concern    Not on file   Social History Narrative    Not on file     Social History     Social History Narrative    Not on file        Social History     Tobacco History     Smoking Status  Current Every Day Smoker Smoking Frequency  0 5 packs/day Smoking Tobacco Type  Cigarettes    Smokeless Tobacco Use  Never Used          Alcohol History     Alcohol Use Status  Yes Comment  social           Drug Use     Drug Use Status  No          Sexual Activity     Sexually Active  Not Asked          Activities of Daily Living    Not Asked                     OBJECTIVE:     Mental Status Evaluation:    Appearance age appropriate, casually dressed   Behavior pleasant, cooperative, good eye contact   Speech normal volume, normal pitch   Mood Anxious   Affect Congruent   Thought Processes logical   Associations intact associations   Thought Content normal   Perceptual Disturbances: none   Abnormal Thoughts  Risk Potential Suicidal ideation - None  Homicidal ideation - None  Potential for aggression - No   Orientation oriented to person, place, time/date and situation   Memory recent and remote memory grossly intact   Cosciousness alert and awake   Attention Span attention span and concentration are age appropriate   Intellect Appears to be of Average Intelligence   Insight age appropriate    Judgement good    Muscle Strength and  Gait muscle strength and tone were normal   Language no difficulty naming common objects   Fund of Knowledge displays adequate knowledge of current events   Pain none   Pain Scale 0       Assessment/Plan:       Diagnoses and all orders for this visit:    Bipolar 2 disorder ([facility] [address] )    Primary insomnia    Anxiety    History of alcohol abuse          Treatment Recommendations/Precautions: Wellbutrin  mg q a m  Cymbalta 30 mg daily  Lamictal 250 mg total per day  Requip 2 mg p o  Q h s    Follow up next month  Continue current medications    Risks/Benefits Risks, Benefits And Possible Side Effects Of Medications:    Risks, benefits, and possible side effects of medications explained to patient and patient verbalizes understanding and agreement for treatment      Controlled Medication Discussion:     Not applicable    Psychotherapy Provided:     Individual psychotherapy provided: No

## 2020-03-14 DIAGNOSIS — F31.81 BIPOLAR 2 DISORDER (HCC): ICD-10-CM

## 2020-03-17 RX ORDER — LORAZEPAM 1 MG/1
TABLET ORAL
Qty: 90 TABLET | Refills: 3 | OUTPATIENT
Start: 2020-03-17

## 2020-03-17 RX ORDER — DULOXETIN HYDROCHLORIDE 60 MG/1
CAPSULE, DELAYED RELEASE ORAL
Qty: 30 CAPSULE | Refills: 0 | OUTPATIENT
Start: 2020-03-17

## 2020-04-03 DIAGNOSIS — F31.81 BIPOLAR 2 DISORDER (HCC): ICD-10-CM

## 2020-04-03 RX ORDER — ROPINIROLE 1 MG/1
TABLET, FILM COATED ORAL
Qty: 60 TABLET | Refills: 2 | OUTPATIENT
Start: 2020-04-03

## 2020-04-03 RX ORDER — ROPINIROLE 1 MG/1
2 TABLET, FILM COATED ORAL
Qty: 60 TABLET | Refills: 3 | Status: SHIPPED | OUTPATIENT
Start: 2020-04-03 | End: 2020-04-29 | Stop reason: SDUPTHER

## 2020-04-29 ENCOUNTER — TELEMEDICINE (OUTPATIENT)
Dept: PSYCHIATRY | Facility: CLINIC | Age: 55
End: 2020-04-29

## 2020-04-29 DIAGNOSIS — F31.62 BIPOLAR 1 DISORDER, MIXED, MODERATE (HCC): Primary | ICD-10-CM

## 2020-04-29 DIAGNOSIS — F41.9 ANXIETY: ICD-10-CM

## 2020-04-29 DIAGNOSIS — F10.11 HISTORY OF ALCOHOL ABUSE: ICD-10-CM

## 2020-04-29 DIAGNOSIS — F31.81 BIPOLAR 2 DISORDER (HCC): ICD-10-CM

## 2020-04-29 DIAGNOSIS — F51.01 PRIMARY INSOMNIA: ICD-10-CM

## 2020-04-29 PROCEDURE — G2012 BRIEF CHECK IN BY MD/QHP: HCPCS | Performed by: PHYSICIAN ASSISTANT

## 2020-04-29 RX ORDER — LAMOTRIGINE 200 MG/1
TABLET ORAL
Qty: 30 TABLET | Refills: 3 | Status: SHIPPED | OUTPATIENT
Start: 2020-04-29 | End: 2020-06-24

## 2020-04-29 RX ORDER — LAMOTRIGINE 25 MG/1
TABLET ORAL
Qty: 60 TABLET | Refills: 3 | Status: SHIPPED | OUTPATIENT
Start: 2020-04-29 | End: 2020-07-14 | Stop reason: SDUPTHER

## 2020-04-29 RX ORDER — DULOXETIN HYDROCHLORIDE 30 MG/1
30 CAPSULE, DELAYED RELEASE ORAL DAILY
Qty: 30 CAPSULE | Refills: 3 | Status: SHIPPED | OUTPATIENT
Start: 2020-04-29 | End: 2020-06-24

## 2020-04-29 RX ORDER — OMEPRAZOLE 20 MG/1
20 CAPSULE, DELAYED RELEASE ORAL DAILY
COMMUNITY
End: 2021-10-15

## 2020-04-29 RX ORDER — ROPINIROLE 2 MG/1
TABLET, FILM COATED ORAL
Qty: 30 TABLET | Refills: 3 | Status: SHIPPED | OUTPATIENT
Start: 2020-04-29 | End: 2020-07-14 | Stop reason: SDUPTHER

## 2020-04-29 RX ORDER — BUPROPION HYDROCHLORIDE 150 MG/1
150 TABLET ORAL EVERY MORNING
Qty: 30 TABLET | Refills: 3 | Status: SHIPPED | OUTPATIENT
Start: 2020-04-29 | End: 2020-07-07

## 2020-05-25 DIAGNOSIS — F31.81 BIPOLAR 2 DISORDER (HCC): ICD-10-CM

## 2020-05-25 RX ORDER — BUPROPION HYDROCHLORIDE 150 MG/1
TABLET ORAL
Qty: 30 TABLET | Refills: 3 | OUTPATIENT
Start: 2020-05-25

## 2020-05-28 DIAGNOSIS — F31.62 BIPOLAR 1 DISORDER, MIXED, MODERATE (HCC): ICD-10-CM

## 2020-05-28 DIAGNOSIS — F31.81 BIPOLAR 2 DISORDER (HCC): ICD-10-CM

## 2020-05-28 RX ORDER — BUPROPION HYDROCHLORIDE 150 MG/1
TABLET ORAL
Qty: 30 TABLET | Refills: 3 | OUTPATIENT
Start: 2020-05-28

## 2020-05-28 RX ORDER — LAMOTRIGINE 25 MG/1
TABLET ORAL
Qty: 60 TABLET | Refills: 3 | OUTPATIENT
Start: 2020-05-28

## 2020-06-09 DIAGNOSIS — F31.62 BIPOLAR 1 DISORDER, MIXED, MODERATE (HCC): ICD-10-CM

## 2020-06-09 RX ORDER — LAMOTRIGINE 25 MG/1
TABLET ORAL
Qty: 60 TABLET | Refills: 3 | OUTPATIENT
Start: 2020-06-09

## 2020-06-24 DIAGNOSIS — F31.81 BIPOLAR 2 DISORDER (HCC): ICD-10-CM

## 2020-06-24 RX ORDER — DULOXETIN HYDROCHLORIDE 30 MG/1
CAPSULE, DELAYED RELEASE ORAL
Qty: 30 CAPSULE | Refills: 3 | Status: SHIPPED | OUTPATIENT
Start: 2020-06-24 | End: 2020-10-30

## 2020-06-24 RX ORDER — LAMOTRIGINE 200 MG/1
TABLET ORAL
Qty: 30 TABLET | Refills: 3 | Status: SHIPPED | OUTPATIENT
Start: 2020-06-24 | End: 2021-03-05 | Stop reason: SDUPTHER

## 2020-06-27 DIAGNOSIS — F31.81 BIPOLAR 2 DISORDER (HCC): ICD-10-CM

## 2020-06-27 RX ORDER — BUPROPION HYDROCHLORIDE 150 MG/1
TABLET ORAL
Qty: 30 TABLET | Refills: 3 | OUTPATIENT
Start: 2020-06-27

## 2020-07-02 ENCOUNTER — TELEPHONE (OUTPATIENT)
Dept: NEUROLOGY | Facility: CLINIC | Age: 55
End: 2020-07-02

## 2020-07-02 NOTE — TELEPHONE ENCOUNTER
1st attempt to reach from email  Patient needs sleep medicine for issues sleeping  Connected to sleep medicine

## 2020-07-06 DIAGNOSIS — F31.81 BIPOLAR 2 DISORDER (HCC): ICD-10-CM

## 2020-07-07 RX ORDER — BUPROPION HYDROCHLORIDE 150 MG/1
TABLET ORAL
Qty: 30 TABLET | Refills: 3 | Status: SHIPPED | OUTPATIENT
Start: 2020-07-07 | End: 2020-07-14

## 2020-07-14 ENCOUNTER — TELEMEDICINE (OUTPATIENT)
Dept: PSYCHIATRY | Facility: CLINIC | Age: 55
End: 2020-07-14

## 2020-07-14 DIAGNOSIS — F51.01 PRIMARY INSOMNIA: ICD-10-CM

## 2020-07-14 DIAGNOSIS — F41.9 ANXIETY: ICD-10-CM

## 2020-07-14 DIAGNOSIS — F31.62 BIPOLAR 1 DISORDER, MIXED, MODERATE (HCC): Primary | ICD-10-CM

## 2020-07-14 PROCEDURE — 99214 OFFICE O/P EST MOD 30 MIN: CPT | Performed by: PHYSICIAN ASSISTANT

## 2020-07-14 RX ORDER — QUETIAPINE FUMARATE 50 MG/1
TABLET, FILM COATED ORAL
Qty: 60 TABLET | Refills: 3 | Status: SHIPPED | OUTPATIENT
Start: 2020-07-14 | End: 2020-07-16

## 2020-07-14 RX ORDER — ROPINIROLE 1 MG/1
TABLET, FILM COATED ORAL
Qty: 90 TABLET | Refills: 3 | Status: SHIPPED | OUTPATIENT
Start: 2020-07-14 | End: 2020-08-24

## 2020-07-14 RX ORDER — LAMOTRIGINE 25 MG/1
TABLET ORAL
Qty: 60 TABLET | Refills: 3 | Status: SHIPPED | OUTPATIENT
Start: 2020-07-14 | End: 2020-09-23

## 2020-07-14 NOTE — PSYCH
Virtual Brief Visit    Assessment/Plan:    Problem List Items Addressed This Visit     None      Visit Diagnoses     Bipolar 1 disorder, mixed, moderate (HCC)    -  Primary    Relevant Medications    rOPINIRole (REQUIP) 1 mg tablet    QUEtiapine (SEROquel) 50 mg tablet    lamoTRIgine (LaMICtal) 25 mg tablet    Primary insomnia        Anxiety                    Reason for visit is   Chief Complaint   Patient presents with    Virtual Brief Visit        Encounter provider Maribel Shah PA-C    Provider located at [address] [id number]    Recent Visits  No visits were found meeting these conditions  Showing recent visits within past 7 days and meeting all other requirements     Today's Visits  Date Type Provider Dept   07/14/20 Telemedicine [facility] [place] [facility] today's visits and meeting all other requirements     Future Appointments  No visits were found meeting these conditions  Showing future appointments within next 150 days and meeting all other requirements        After connecting through telephone, the patient was identified by name and date of birth  Khushboo Wan was informed that this is a telemedicine visit and that the visit is being conducted through telephone  My office door was closed  No one else was in the room  She acknowledged consent and understanding of privacy and security of the platform  The patient has agreed to participate and understands she can discontinue the visit at any time  Patient is aware this is a billable service  Subjective    Khushboo Wan is a 60 y o  female with history of bipolar disorder  HPI   Patient reports that she has been having significant issues with sleep for the past couple months  States shortly after our last visit in April as she was having difficulty with sleep    States that she will stay awake for four days at a time and then she will crash  During the four days that she is awake she is accomplishing a lot of things  States she has been cooking and baking and cleaning  Also states that she has increased physical pain from her arthritis  Irritable at times  Denies any significant depressive episodes recently  No suicidal or homicidal ideation  Taking her medications as prescribed  Reports that she has a good stable relationship with her spouse  Nothing has changed as far as her daughter  Denies any significant change in stressors  No new medications are medical issues  Past Medical History:   Diagnosis Date    Anxiety     Depression     Ovarian cyst     Urinary incontinence        Past Surgical History:   Procedure Laterality Date    BACK SURGERY      fusion    CERVICAL SPINE SURGERY      CYSTOSCOPY  03/13/2018    Dr Mesa     OOPHORECTOMY Right     [place] LAP,SLING OPERATION N/A 4/24/2018    Procedure: INSERTION PUBOVAGINAL SLING (FEMALE) Trans obturator mid urethral sling insertion, CYSTOSCOPY;  Surgeon: Lucila Galdamez MD;  [facility];  Service: Urology       Current Outpatient Medications   Medication Sig Dispense Refill    DULoxetine (CYMBALTA) 30 mg delayed release capsule TAKE ONE CAPSULE BY MOUTH EVERY DAY 30 capsule 3    lamoTRIgine (LaMICtal) 200 MG tablet TAKE ONE TABLET BY MOUTH AT BEDTIME 30 tablet 3    lamoTRIgine (LaMICtal) 25 mg tablet 2 po  qam 60 tablet 3    omeprazole (PriLOSEC) 20 mg delayed release capsule Take 20 mg by mouth daily      predniSONE 10 mg tablet 5x2, 4x2, 3x2, 2x2, 1x2, then stop 30 tablet 0    QUEtiapine (SEROquel) 50 mg tablet 1-2 po qhs 60 tablet 3    rOPINIRole (REQUIP) 1 mg tablet 1 tab po tid 90 tablet 3     No current facility-administered medications for this visit           Allergies   Allergen Reactions    Azithromycin Rash    Erythromycin Rash       Review of Systems   Generalized pain from arthritis, sleep decreased, adequate appetite    Mental status exam:  Speech is somewhat pressured, coherent  Mood is anxious, irritable at times, hypomanic  Circumstantial thought process  No suicidal or homicidal ideation  No psychotic signs or symptoms, no hallucinations or delusions noted  Cognition is intact  Insight and judgment is fair    Medications as follows: Add Seroquel 50 mg 1-2 at nighttime  Discontinue Wellbutrin  mg q a m  Continue Cymbalta 30 mg daily, since being back on this this has been helpful for her pain as well as her mood  Continue lamotrigine 50 mg the morning and 200 at night  Requip 1 mg 3 times a day, states this has been helpful for her restlessness and restless legs  Will follow up in the office in about three months and she will call sooner if no improvement    There were no vitals filed for this visit  I spent 25 minutes directly with the patient during this visit    Vivekmulugeta William acknowledges that she has consented to an online visit or consultation  She understands that the online visit is based solely on information provided by her, and that, in the absence of a face-to-face physical evaluation by the physician, the diagnosis she receives is both limited and provisional in terms of accuracy and completeness  This is not intended to replace a full medical face-to-face evaluation by the physician  Nicholas Hernadez understands and accepts these terms

## 2020-07-14 NOTE — BH TREATMENT PLAN
TREATMENT PLAN (Medication Management Only)        [facility]    Name and Date of Birth:  Martha Snyder 60 y o  1965  Date of Treatment Plan: July 14, 2020  Diagnosis/Diagnoses:    1  Bipolar 1 disorder, mixed, moderate ([address] )    2  Primary insomnia    3  Anxiety      Strengths/Personal Resources for Self-Care: supportive family, taking medications as prescribed  Area/Areas of need (in own words): "Sleep"  1  Long Term Goal: Improved sleep, stable moods  Target Date: 2 months - 9/14/2020  Person/Persons responsible for completion of goal: Miguelina  2  Short Term Objective (s) - How will we reach this goal?:   A  Provider new recommended medication/dosage changes and/or continue medication(s):  Add Seroquel  mg at nighttime and discontinue Wellbutrin   B  N/A   C  N/A  Target Date: 3 months - 10/14/2020  Person/Persons Responsible for Completion of Goal: Miguelina  Progress Towards Goals: continuing treatment  Treatment Modality: medication management every 3 months  Review due 90 to 120 days from date of this plan: Six months  Expected length of service: ongoing treatment  My Physician/PA/NP and I have developed this plan together and I agree to work on the goals and objectives  I understand the treatment goals that were developed for my treatment     Patient's appointment done via telephone due to Lala Mendez so unable to sign treatment plan

## 2020-07-15 ENCOUNTER — TELEPHONE (OUTPATIENT)
Dept: PSYCHIATRY | Facility: CLINIC | Age: 55
End: 2020-07-15

## 2020-07-15 NOTE — TELEPHONE ENCOUNTER
Marge Fierro started seroquel yesterday, but feels as though she's "bouncing off of walls" the medication is not helping her

## 2020-07-16 DIAGNOSIS — F31.62 BIPOLAR 1 DISORDER, MIXED, MODERATE (HCC): Primary | ICD-10-CM

## 2020-07-16 RX ORDER — OLANZAPINE 5 MG/1
5 TABLET ORAL
Qty: 30 TABLET | Refills: 2 | Status: SHIPPED | OUTPATIENT
Start: 2020-07-16 | End: 2020-07-22 | Stop reason: SDUPTHER

## 2020-07-16 NOTE — TELEPHONE ENCOUNTER
Patient states that she took 100 mg at Seroquel at night and was still awake all night  She repeated the dose in the morning and felt as though she was bouncing off the walls  She does not want to continue with that medication  We will trial olanzapine 5 mg at bedtime prescription was sent to [facility] pharmacy    Will call with update

## 2020-07-22 ENCOUNTER — TELEPHONE (OUTPATIENT)
Dept: PSYCHIATRY | Facility: CLINIC | Age: 55
End: 2020-07-22

## 2020-07-22 DIAGNOSIS — F31.62 BIPOLAR 1 DISORDER, MIXED, MODERATE (HCC): ICD-10-CM

## 2020-07-22 RX ORDER — OLANZAPINE 10 MG/1
10 TABLET ORAL
Qty: 30 TABLET | Refills: 2 | Status: SHIPPED | OUTPATIENT
Start: 2020-07-22 | End: 2020-11-20

## 2020-07-22 RX ORDER — BENZTROPINE MESYLATE 1 MG/1
1 TABLET ORAL
Qty: 30 TABLET | Refills: 2 | Status: SHIPPED | OUTPATIENT
Start: 2020-07-22 | End: 2020-11-20

## 2020-07-22 NOTE — TELEPHONE ENCOUNTER
Miguelina called said that the medication Olanzapine 5 mg is making her feel Hyper and bouncing off the wall  Patient would like a call back          Contact# [phone]

## 2020-07-22 NOTE — TELEPHONE ENCOUNTER
Increase olanzapine to 10 mg at bedtime and add Cogentin 1 mg at HS    She will call with follow-up and as needed

## 2020-07-27 DIAGNOSIS — F31.81 BIPOLAR 2 DISORDER (HCC): ICD-10-CM

## 2020-07-27 RX ORDER — BUPROPION HYDROCHLORIDE 150 MG/1
TABLET ORAL
Qty: 30 TABLET | Refills: 3 | OUTPATIENT
Start: 2020-07-27

## 2020-08-23 DIAGNOSIS — F31.62 BIPOLAR 1 DISORDER, MIXED, MODERATE (HCC): ICD-10-CM

## 2020-08-24 RX ORDER — ROPINIROLE 1 MG/1
TABLET, FILM COATED ORAL
Qty: 60 TABLET | Refills: 3 | Status: SHIPPED | OUTPATIENT
Start: 2020-08-24 | End: 2020-09-28

## 2020-09-22 DIAGNOSIS — F31.62 BIPOLAR 1 DISORDER, MIXED, MODERATE (HCC): ICD-10-CM

## 2020-09-23 RX ORDER — LAMOTRIGINE 25 MG/1
TABLET ORAL
Qty: 60 TABLET | Refills: 3 | Status: SHIPPED | OUTPATIENT
Start: 2020-09-23 | End: 2020-12-23

## 2020-09-27 DIAGNOSIS — F31.62 BIPOLAR 1 DISORDER, MIXED, MODERATE (HCC): ICD-10-CM

## 2020-09-28 RX ORDER — ROPINIROLE 1 MG/1
TABLET, FILM COATED ORAL
Qty: 60 TABLET | Refills: 3 | Status: SHIPPED | OUTPATIENT
Start: 2020-09-28 | End: 2020-10-30

## 2020-10-30 DIAGNOSIS — F31.62 BIPOLAR 1 DISORDER, MIXED, MODERATE (HCC): ICD-10-CM

## 2020-10-30 DIAGNOSIS — F31.81 BIPOLAR 2 DISORDER (HCC): ICD-10-CM

## 2020-10-30 RX ORDER — ROPINIROLE 1 MG/1
TABLET, FILM COATED ORAL
Qty: 60 TABLET | Refills: 3 | Status: SHIPPED | OUTPATIENT
Start: 2020-10-30 | End: 2020-11-18

## 2020-10-30 RX ORDER — DULOXETIN HYDROCHLORIDE 30 MG/1
CAPSULE, DELAYED RELEASE ORAL
Qty: 30 CAPSULE | Refills: 3 | Status: SHIPPED | OUTPATIENT
Start: 2020-10-30 | End: 2021-03-05 | Stop reason: SDUPTHER

## 2020-11-17 DIAGNOSIS — F31.62 BIPOLAR 1 DISORDER, MIXED, MODERATE (HCC): ICD-10-CM

## 2020-11-18 RX ORDER — ROPINIROLE 1 MG/1
1 TABLET, FILM COATED ORAL 3 TIMES DAILY
Qty: 90 TABLET | Refills: 1 | Status: SHIPPED | OUTPATIENT
Start: 2020-11-18 | End: 2020-11-20

## 2020-11-19 ENCOUNTER — TELEPHONE (OUTPATIENT)
Dept: OTHER | Facility: OTHER | Age: 55
End: 2020-11-19

## 2020-11-20 ENCOUNTER — TELEMEDICINE (OUTPATIENT)
Dept: PSYCHIATRY | Facility: CLINIC | Age: 55
End: 2020-11-20
Payer: MEDICARE

## 2020-11-20 DIAGNOSIS — F41.9 ANXIETY: ICD-10-CM

## 2020-11-20 DIAGNOSIS — F51.01 PRIMARY INSOMNIA: ICD-10-CM

## 2020-11-20 DIAGNOSIS — F31.81 BIPOLAR 2 DISORDER (HCC): ICD-10-CM

## 2020-11-20 DIAGNOSIS — F31.62 BIPOLAR 1 DISORDER, MIXED, MODERATE (HCC): Primary | ICD-10-CM

## 2020-11-20 DIAGNOSIS — F10.11 HISTORY OF ALCOHOL ABUSE: ICD-10-CM

## 2020-11-20 PROCEDURE — 99443 PR PHYS/QHP TELEPHONE EVALUATION 21-30 MIN: CPT | Performed by: PHYSICIAN ASSISTANT

## 2020-11-20 RX ORDER — ROPINIROLE 1 MG/1
1 TABLET, FILM COATED ORAL 4 TIMES DAILY
Qty: 120 TABLET | Refills: 5 | Status: SHIPPED | OUTPATIENT
Start: 2020-11-20 | End: 2021-07-08

## 2020-11-25 ENCOUNTER — TELEPHONE (OUTPATIENT)
Dept: PSYCHIATRY | Facility: CLINIC | Age: 55
End: 2020-11-25

## 2020-11-25 DIAGNOSIS — F51.01 PRIMARY INSOMNIA: Primary | ICD-10-CM

## 2020-11-25 RX ORDER — RAMELTEON 8 MG/1
8 TABLET ORAL
Qty: 30 TABLET | Refills: 2 | Status: SHIPPED | OUTPATIENT
Start: 2020-11-25 | End: 2020-12-02

## 2020-11-30 ENCOUNTER — TELEPHONE (OUTPATIENT)
Dept: PSYCHIATRY | Facility: CLINIC | Age: 55
End: 2020-11-30

## 2020-12-02 ENCOUNTER — TELEPHONE (OUTPATIENT)
Dept: PSYCHIATRY | Facility: CLINIC | Age: 55
End: 2020-12-02

## 2020-12-02 DIAGNOSIS — F31.62 BIPOLAR 1 DISORDER, MIXED, MODERATE (HCC): Primary | ICD-10-CM

## 2020-12-02 DIAGNOSIS — F51.01 PRIMARY INSOMNIA: ICD-10-CM

## 2020-12-02 DIAGNOSIS — F41.9 ANXIETY: Primary | ICD-10-CM

## 2020-12-02 RX ORDER — ARIPIPRAZOLE 5 MG/1
5 TABLET ORAL DAILY
Qty: 30 TABLET | Refills: 2 | Status: SHIPPED | OUTPATIENT
Start: 2020-12-02 | End: 2021-03-05 | Stop reason: SDUPTHER

## 2020-12-02 RX ORDER — HYDROXYZINE HYDROCHLORIDE 25 MG/1
TABLET, FILM COATED ORAL
Qty: 60 TABLET | Refills: 2 | Status: SHIPPED | OUTPATIENT
Start: 2020-12-02 | End: 2020-12-02

## 2020-12-18 ENCOUNTER — TELEPHONE (OUTPATIENT)
Dept: PSYCHIATRY | Facility: CLINIC | Age: 55
End: 2020-12-18

## 2020-12-21 DIAGNOSIS — F31.62 BIPOLAR 1 DISORDER, MIXED, MODERATE (HCC): ICD-10-CM

## 2020-12-23 RX ORDER — LAMOTRIGINE 25 MG/1
TABLET ORAL
Qty: 60 TABLET | Refills: 3 | Status: SHIPPED | OUTPATIENT
Start: 2020-12-23 | End: 2020-12-25

## 2020-12-24 DIAGNOSIS — F31.62 BIPOLAR 1 DISORDER, MIXED, MODERATE (HCC): ICD-10-CM

## 2020-12-25 RX ORDER — LAMOTRIGINE 25 MG/1
TABLET ORAL
Qty: 60 TABLET | Refills: 3 | Status: SHIPPED | OUTPATIENT
Start: 2020-12-25 | End: 2021-03-05 | Stop reason: SDUPTHER

## 2021-01-09 ENCOUNTER — OFFICE VISIT (OUTPATIENT)
Dept: URGENT CARE | Age: 56
End: 2021-01-09
Payer: MEDICARE

## 2021-01-09 VITALS
HEIGHT: 67 IN | TEMPERATURE: 97 F | WEIGHT: 200 LBS | HEART RATE: 90 BPM | BODY MASS INDEX: 31.39 KG/M2 | RESPIRATION RATE: 18 BRPM | OXYGEN SATURATION: 96 %

## 2021-01-09 DIAGNOSIS — J39.8 CONGESTION OF UPPER RESPIRATORY TRACT: ICD-10-CM

## 2021-01-09 DIAGNOSIS — J02.9 SORE THROAT: Primary | ICD-10-CM

## 2021-01-09 PROCEDURE — 99213 OFFICE O/P EST LOW 20 MIN: CPT | Performed by: NURSE PRACTITIONER

## 2021-01-09 PROCEDURE — G0463 HOSPITAL OUTPT CLINIC VISIT: HCPCS | Performed by: NURSE PRACTITIONER

## 2021-01-09 PROCEDURE — U0003 INFECTIOUS AGENT DETECTION BY NUCLEIC ACID (DNA OR RNA); SEVERE ACUTE RESPIRATORY SYNDROME CORONAVIRUS 2 (SARS-COV-2) (CORONAVIRUS DISEASE [COVID-19]), AMPLIFIED PROBE TECHNIQUE, MAKING USE OF HIGH THROUGHPUT TECHNOLOGIES AS DESCRIBED BY CMS-2020-01-R: HCPCS

## 2021-01-09 PROCEDURE — U0005 INFEC AGEN DETEC AMPLI PROBE: HCPCS

## 2021-01-09 NOTE — PATIENT INSTRUCTIONS
We saw you today for sore throat and ear pain  We did test you for COVID today  Rest   Drink plenty of fluids  Take Tylenol and Motrin for fever or pain  Go to the ER for chest pain or shortness of breath  Follow-up with your family doctor for any other concerns  Consider taking a vitamin C your vitamin-D supplement  You will receive an e-mail with your results  You will also receive a phone call if you are positive

## 2021-01-09 NOTE — PROGRESS NOTES
Assessment/Plan    Sore throat [J02 9]  1  Sore throat     2  Congestion of upper respiratory tract  Novel Coronavirus (COVID-19), PCR [facility] - Office Collection         Subjective:     Patient ID: Meño Gardner is a 60 y o  female  Reason For Visit / Chief Complaint  Chief Complaint   Patient presents with    COVID-19     ear pain, sinus pressure, sore throat that started a few days ago         This is a 10-year-old female patient who presents to the urgent care today  Patient is complaining of a sore throat, congestion and sinus pressure for approximately 3 days  Patient denies fever or chills  Patient denies chest pain or shortness of breath  Patient denies abdominal pain, nausea, vomiting or diarrhea  Patient denies back pain  Patient denies painful swallowing  Patient denies using over-the-counter medicine  Past Medical History:   Diagnosis Date    Anxiety     Depression     Ovarian cyst     Urinary incontinence        Past Surgical History:   Procedure Laterality Date    BACK SURGERY      fusion    CERVICAL SPINE SURGERY      CYSTOSCOPY  03/13/2018    Dr Mesa     OOPHORECTOMY Right     [place] LAP,SLING OPERATION N/A 4/24/2018    Procedure: INSERTION PUBOVAGINAL SLING (FEMALE) Trans obturator mid urethral sling insertion, CYSTOSCOPY;  Surgeon: Ricki Odom MD;  [facility];  Service: Urology       Family History   Problem Relation Age of Onset    No Known Problems Father     No Known Problems Mother        Review of Systems   Constitutional: Negative for chills and fever  HENT: Positive for congestion, ear pain, rhinorrhea, sinus pressure, sinus pain and sore throat  Negative for trouble swallowing  Respiratory: Negative for cough, shortness of breath, wheezing and stridor  Cardiovascular: Negative for chest pain  Gastrointestinal: Negative for abdominal pain, constipation, diarrhea, nausea and vomiting     Musculoskeletal: Negative for back pain and neck pain    Skin: Negative for color change  Neurological: Negative for headaches  Objective:    Pulse 90   Temp (!) 97 °F (36 1 °C)   Resp 18   Ht 5' 7" (1 702 m)   Wt 90 7 kg (200 lb)   SpO2 96%   BMI 31 32 kg/m²     Physical Exam  Constitutional:       General: She is not in acute distress  Appearance: Normal appearance  She is normal weight  She is not ill-appearing  HENT:      Head: Normocephalic and atraumatic  Right Ear: Tympanic membrane is not erythematous  Left Ear: Tympanic membrane is not erythematous  Nose: Congestion and rhinorrhea present  Right Sinus: No maxillary sinus tenderness or frontal sinus tenderness  Left Sinus: No maxillary sinus tenderness or frontal sinus tenderness  Mouth/Throat:      Mouth: Mucous membranes are moist       Pharynx: No oropharyngeal exudate or posterior oropharyngeal erythema  Tonsils: No tonsillar exudate  Neck:      Musculoskeletal: Normal range of motion  Cardiovascular:      Rate and Rhythm: Normal rate and regular rhythm  Pulses: Normal pulses  Heart sounds: Normal heart sounds  No murmur  No friction rub  No gallop  Pulmonary:      Effort: Pulmonary effort is normal  No respiratory distress  Breath sounds: Normal breath sounds  No stridor  No wheezing, rhonchi or rales  Chest:      Chest wall: No tenderness  Lymphadenopathy:      Cervical: No cervical adenopathy  Skin:     General: Skin is warm and dry  Capillary Refill: Capillary refill takes less than 2 seconds  Neurological:      Mental Status: She is alert and oriented to person, place, and time     Psychiatric:         Mood and Affect: Mood normal

## 2021-01-11 LAB — SARS-COV-2 RNA SPEC QL NAA+PROBE: NOT DETECTED

## 2021-03-05 ENCOUNTER — TELEMEDICINE (OUTPATIENT)
Dept: PSYCHIATRY | Facility: CLINIC | Age: 56
End: 2021-03-05
Payer: MEDICARE

## 2021-03-05 DIAGNOSIS — F51.01 PRIMARY INSOMNIA: ICD-10-CM

## 2021-03-05 DIAGNOSIS — F31.81 BIPOLAR 2 DISORDER (HCC): ICD-10-CM

## 2021-03-05 DIAGNOSIS — F31.62 BIPOLAR 1 DISORDER, MIXED, MODERATE (HCC): Primary | ICD-10-CM

## 2021-03-05 DIAGNOSIS — F41.9 ANXIETY: ICD-10-CM

## 2021-03-05 PROCEDURE — 99442 PR PHYS/QHP TELEPHONE EVALUATION 11-20 MIN: CPT | Performed by: PHYSICIAN ASSISTANT

## 2021-03-05 RX ORDER — LAMOTRIGINE 25 MG/1
50 TABLET ORAL EVERY MORNING
Qty: 60 TABLET | Refills: 5 | Status: SHIPPED | OUTPATIENT
Start: 2021-03-05 | End: 2021-04-25

## 2021-03-05 RX ORDER — DULOXETIN HYDROCHLORIDE 30 MG/1
30 CAPSULE, DELAYED RELEASE ORAL DAILY
Qty: 30 CAPSULE | Refills: 5 | Status: SHIPPED | OUTPATIENT
Start: 2021-03-05 | End: 2021-04-23

## 2021-03-05 RX ORDER — LAMOTRIGINE 100 MG/1
200 TABLET ORAL
Qty: 60 TABLET | Refills: 5 | Status: SHIPPED | OUTPATIENT
Start: 2021-03-05 | End: 2021-07-08 | Stop reason: SDUPTHER

## 2021-03-05 RX ORDER — CELECOXIB 200 MG/1
200 CAPSULE ORAL 2 TIMES DAILY
COMMUNITY
Start: 2021-01-20 | End: 2021-10-15 | Stop reason: ALTCHOICE

## 2021-03-05 RX ORDER — ERGOCALCIFEROL 1.25 MG/1
50000 CAPSULE ORAL WEEKLY
COMMUNITY
Start: 2021-01-11 | End: 2022-02-18 | Stop reason: ALTCHOICE

## 2021-03-05 RX ORDER — ARIPIPRAZOLE 10 MG/1
10 TABLET ORAL DAILY
Qty: 30 TABLET | Refills: 5 | Status: SHIPPED | OUTPATIENT
Start: 2021-03-05 | End: 2021-07-08 | Stop reason: SDUPTHER

## 2021-03-05 NOTE — BH TREATMENT PLAN
TREATMENT PLAN (Medication Management Only)        [facility]    Name and Date of Birth:  Diego Acevedo 60 y o  1965  Date of Treatment Plan: March 5, 2021  Diagnosis/Diagnoses:    1  Bipolar 1 disorder, mixed, moderate ([address] )    2  Anxiety    3  Primary insomnia    4  Bipolar 2 disorder [facility])      Strengths/Personal Resources for Self-Care: supportive family, ability to understand psychiatric illness  Area/Areas of need (in own words): sleep problems  1  Long Term Goal: continue improvement insleep  Target Date:4 months - 7/5/2021  Person/Persons responsible for completion of goal: Miguelina  2  Short Term Objective (s) - How will we reach this goal?:   A  Provider new recommended medication/dosage changes and/or continue medication(s): Increase  Abilify to 10 mg, continue other medications  B  N/A   C  N/A  Target Date:4 months - 7/5/2021  Person/Persons Responsible for Completion of Goal: Miguelina  Progress Towards Goals: stable  Treatment Modality: medication management every 4 months  Review due 180 days from date of this plan: 6 months - 9/5/2021  Expected length of service: maintenance  My Physician/PA/NP and I have developed this plan together and I agree to work on the goals and objectives  I understand the treatment goals that were developed for my treatment

## 2021-03-05 NOTE — PSYCH
Virtual Brief Visit    Assessment/Plan:    Problem List Items Addressed This Visit     None      Visit Diagnoses     Bipolar 1 disorder, mixed, moderate (HCC)    -  Primary    Relevant Medications    ARIPiprazole (ABILIFY) 10 mg tablet    lamoTRIgine (LaMICtal) 25 mg tablet    DULoxetine (CYMBALTA) 30 mg delayed release capsule    Anxiety        Primary insomnia        Bipolar 2 disorder (HCC)        Relevant Medications    lamoTRIgine (LaMICtal) 100 mg tablet    ARIPiprazole (ABILIFY) 10 mg tablet    DULoxetine (CYMBALTA) 30 mg delayed release capsule                Reason for visit is   Chief Complaint   Patient presents with    Follow-up    Medication Management    Virtual Brief Visit        Encounter provider Saint Nordmann, PA-C    Provider located at [facility] [id number]    Recent Visits  No visits were found meeting these conditions  Showing recent visits within past 7 days and meeting all other requirements     Today's Visits  Date Type Provider Dept   03/05/21 Telemedicine Saint Nordmann, PA-C [address] today's visits and meeting all other requirements     Future Appointments  No visits were found meeting these conditions  Showing future appointments within next 150 days and meeting all other requirements        After connecting through telephone, the patient was identified by name and date of birth  Chelsea Mendez was informed that this is a telemedicine visit and that the visit is being conducted through telephone  My office door was closed  No one else was in the room  She acknowledged consent and understanding of privacy and security of the platform  The patient has agreed to participate and understands she can discontinue the visit at any time  Patient is aware this is a billable service       Subjective    Chelsea Mendez is a 60 y o  female bipolar disorder  HPI   Miguelina reports that overall she is doing better with her moods  Since being on Abilify  No panic attacks or significant depressive episodes  No joe  States that occasionally she will have episodes where she feels irritable and "snarky"   She is more aware of this  andstates she will walk away when she feels like that  No verbal or physical aggression  Reports that she has a good stable relationship with her spouse  She is sleeping 3-5 hours at night but states she feels rested  She has been taking Seroquel 50 mg at bedtime though about 3-4 times per week  Physically states that she has arthritis pain  Medication list was reviewed and updated  She is exercising on a more regular basis  Also trying to watch her diet and states she has lost 8 lb  tolerating medications well without adverse effect    No rash noted    Past Medical History:   Diagnosis Date    Anxiety     Depression     Ovarian cyst     Urinary incontinence        Past Surgical History:   Procedure Laterality Date    BACK SURGERY      fusion    CERVICAL SPINE SURGERY      CYSTOSCOPY  03/13/2018    Dr Mesa     OOPHORECTOMY Right     [place] LAP,SLING OPERATION N/A 4/24/2018    Procedure: INSERTION PUBOVAGINAL SLING (FEMALE) Trans obturator mid urethral sling insertion, CYSTOSCOPY;  Surgeon: Hector Robles MD;  [facility];  Service: Urology       Current Outpatient Medications   Medication Sig Dispense Refill    celecoxib (CeleBREX) 200 mg capsule Take 200 mg by mouth 2 (two) times a day      ARIPiprazole (ABILIFY) 10 mg tablet Take 1 tablet (10 mg total) by mouth daily 30 tablet 5    DULoxetine (CYMBALTA) 30 mg delayed release capsule Take 1 capsule (30 mg total) by mouth daily 30 capsule 5    ergocalciferol (VITAMIN D2) 50,000 units Take 50,000 Units by mouth once a week      lamoTRIgine (LaMICtal) 100 mg tablet Take 2 tablets (200 mg total) by mouth daily at bedtime 60 tablet 5    lamoTRIgine (LaMICtal) 25 mg tablet Take 2 tablets (50 mg total) by mouth every morning 60 tablet 5    omeprazole (PriLOSEC) 20 mg delayed release capsule Take 20 mg by mouth daily      predniSONE 10 mg tablet 5x2, 4x2, 3x2, 2x2, 1x2, then stop 30 tablet 0    rOPINIRole (REQUIP) 1 mg tablet Take 1 tablet (1 mg total) by mouth 4 (four) times a day 120 tablet 5     No current facility-administered medications for this visit  Allergies   Allergen Reactions    Azithromycin Rash    Erythromycin Rash       Review of Systems    as noted in HPI     Mental status exam:    pleasant, calm, cooperative  Speech is clear and coherent, normal rate and volume   Mood is euthymic  Linear coherent thought process   No suicidal or homicidal ideation   No psychotic signs or symptoms, no hallucinations or delusions  Cognition is intact   Insight and judgment is intact    There were no vitals filed for this visit  Medications as follows:     Due to her needing Seroquel several times a week will increase Abilify to 10 mg daily  To decrease polypharmacy  Lamotrigine 50 mg the morning 200 mg at night   Requip 1 mg  Taking as needed   Cymbalta 30 mg daily  Will follow-up in four months and she will call sooner if any questions or concerns      I spent 20 minutes directly with the patient during this visit, reviewing chart, medical records and medications    VIRTUAL VISIT Warren Alegre acknowledges that she has consented to an online visit or consultation  She understands that the online visit is based solely on information provided by her, and that, in the absence of a face-to-face physical evaluation by the physician, the diagnosis she receives is both limited and provisional in terms of accuracy and completeness  This is not intended to replace a full medical face-to-face evaluation by the physician  Shana Beltran understands and accepts these terms

## 2021-03-23 DIAGNOSIS — F31.62 BIPOLAR 1 DISORDER, MIXED, MODERATE (HCC): ICD-10-CM

## 2021-03-24 RX ORDER — ARIPIPRAZOLE 5 MG/1
TABLET ORAL
Qty: 30 TABLET | Refills: 2 | OUTPATIENT
Start: 2021-03-24

## 2021-03-26 DIAGNOSIS — F31.62 BIPOLAR 1 DISORDER, MIXED, MODERATE (HCC): ICD-10-CM

## 2021-03-26 RX ORDER — QUETIAPINE FUMARATE 50 MG/1
TABLET, FILM COATED ORAL
Qty: 60 TABLET | Refills: 3 | OUTPATIENT
Start: 2021-03-26

## 2021-04-13 DIAGNOSIS — Z23 ENCOUNTER FOR IMMUNIZATION: ICD-10-CM

## 2021-04-22 DIAGNOSIS — F31.81 BIPOLAR 2 DISORDER (HCC): ICD-10-CM

## 2021-04-22 DIAGNOSIS — F31.62 BIPOLAR 1 DISORDER, MIXED, MODERATE (HCC): ICD-10-CM

## 2021-04-23 RX ORDER — DULOXETIN HYDROCHLORIDE 30 MG/1
CAPSULE, DELAYED RELEASE ORAL
Qty: 30 CAPSULE | Refills: 3 | Status: SHIPPED | OUTPATIENT
Start: 2021-04-23 | End: 2021-07-08 | Stop reason: SDUPTHER

## 2021-04-24 RX ORDER — LAMOTRIGINE 25 MG/1
TABLET ORAL
Qty: 60 TABLET | Refills: 3 | OUTPATIENT
Start: 2021-04-24

## 2021-04-25 DIAGNOSIS — F31.62 BIPOLAR 1 DISORDER, MIXED, MODERATE (HCC): ICD-10-CM

## 2021-04-25 RX ORDER — LAMOTRIGINE 25 MG/1
TABLET ORAL
Qty: 60 TABLET | Refills: 2 | Status: SHIPPED | OUTPATIENT
Start: 2021-04-25 | End: 2021-07-08

## 2021-07-08 ENCOUNTER — TELEMEDICINE (OUTPATIENT)
Dept: PSYCHIATRY | Facility: CLINIC | Age: 56
End: 2021-07-08
Payer: MEDICARE

## 2021-07-08 DIAGNOSIS — F41.9 ANXIETY: ICD-10-CM

## 2021-07-08 DIAGNOSIS — M79.7 FIBROMYALGIA: ICD-10-CM

## 2021-07-08 DIAGNOSIS — F51.01 PRIMARY INSOMNIA: ICD-10-CM

## 2021-07-08 DIAGNOSIS — F31.81 BIPOLAR 2 DISORDER (HCC): ICD-10-CM

## 2021-07-08 DIAGNOSIS — F31.62 BIPOLAR 1 DISORDER, MIXED, MODERATE (HCC): Primary | ICD-10-CM

## 2021-07-08 PROCEDURE — 99443 PR PHYS/QHP TELEPHONE EVALUATION 21-30 MIN: CPT | Performed by: PHYSICIAN ASSISTANT

## 2021-07-08 RX ORDER — GABAPENTIN 100 MG/1
100 CAPSULE ORAL 3 TIMES DAILY
Qty: 90 CAPSULE | Refills: 2 | Status: SHIPPED | OUTPATIENT
Start: 2021-07-08 | End: 2021-10-15 | Stop reason: SDUPTHER

## 2021-07-08 RX ORDER — ARIPIPRAZOLE 10 MG/1
10 TABLET ORAL DAILY
Qty: 30 TABLET | Refills: 2 | Status: SHIPPED | OUTPATIENT
Start: 2021-07-08 | End: 2021-10-09 | Stop reason: SDUPTHER

## 2021-07-08 RX ORDER — LAMOTRIGINE 100 MG/1
200 TABLET ORAL
Qty: 60 TABLET | Refills: 2 | Status: SHIPPED | OUTPATIENT
Start: 2021-07-08 | End: 2021-10-09 | Stop reason: SDUPTHER

## 2021-07-08 RX ORDER — DULOXETIN HYDROCHLORIDE 30 MG/1
30 CAPSULE, DELAYED RELEASE ORAL DAILY
Qty: 30 CAPSULE | Refills: 2 | Status: SHIPPED | OUTPATIENT
Start: 2021-07-08 | End: 2021-10-11 | Stop reason: SDUPTHER

## 2021-07-08 NOTE — PSYCH
Virtual Brief Visit    Assessment/Plan:    Problem List Items Addressed This Visit        Other    Fibromyalgia    Relevant Medications    gabapentin (NEURONTIN) 100 mg capsule    DULoxetine (CYMBALTA) 30 mg delayed release capsule    Other Relevant Orders    Ambulatory referral to Rheumatology      Other Visit Diagnoses     Bipolar 1 disorder, mixed, moderate (HCC)    -  Primary    Relevant Medications    gabapentin (NEURONTIN) 100 mg capsule    ARIPiprazole (ABILIFY) 10 mg tablet    lamoTRIgine (LaMICtal) 100 mg tablet    DULoxetine (CYMBALTA) 30 mg delayed release capsule    Anxiety        Relevant Medications    gabapentin (NEURONTIN) 100 mg capsule    DULoxetine (CYMBALTA) 30 mg delayed release capsule    Primary insomnia        Bipolar 2 disorder (HCC)        Relevant Medications    ARIPiprazole (ABILIFY) 10 mg tablet    DULoxetine (CYMBALTA) 30 mg delayed release capsule                Reason for visit is   Chief Complaint   Patient presents with    Follow-up    Medication Management    Virtual Brief Visit        Encounter provider Keyur Victor PA-C    Provider located at [address] [id number]    Recent Visits  No visits were found meeting these conditions  Showing recent visits within past 7 days and meeting all other requirements  Today's Visits  Date Type Provider Dept   07/08/21 Telemedicine [facility] today's visits and meeting all other requirements  Future Appointments  No visits were found meeting these conditions  Showing future appointments within next 150 days and meeting all other requirements       After connecting through telephone, the patient was identified by name and date of birth  Mac Nab was informed that this is a telemedicine visit and that the visit is being conducted through telephone  My office door was closed   No one else was in the room  She acknowledged consent and understanding of privacy and security of the platform  The patient has agreed to participate and understands she can discontinue the visit at any time  Patient is aware this is a billable service  Subjective    Padilla Yañez is a 60 y o  female   With history of bipolar disorder  HPI    Miguelina was seen for follow-up of bipolar disorder and medication management  overall she has been doing well with her moods over the past few months  No significant depressive episodes or manic episodes  She is sleeping better with increase in Abilify  States that she is getting about 5 hours at night which is an improvement  Adequate appetite  No suicidality, no psychotic signs or  symptoms  Denies any adverse effects with her medications  No rash  Taking her medications as prescribed  Reports that she continues with pain due to fibromyalgia  She was seen by Rheumatology in January and for a follow-up in May  Note was reviewed from [facility] services  States they discussed gabapentin or Lyrica as an option but deferred for psychiatry or her PCP to prescribe this  Referral was put through for rheumatology through [facility] as the patient requested a new rheumatology provider  Until she is able to obtain another Rheumatology appointment we discussed initiation of gabapentin  We had trialed this in the past for her mood and this was tapered off due to minimal change with her mood two years ago  She did not have any adverse effects with this medication  Labs reviewed and kidney functions within normal limits  Discussed reduction in lamotrigine and also discontinuation of Requip with initiation of gabapentin to decrease polypharmacy  Patient is in agreement with treatment plan      Past Medical History:   Diagnosis Date    Anxiety     Depression     Ovarian cyst     Urinary incontinence        Past Surgical History:   Procedure Laterality Date    BACK SURGERY      fusion    CERVICAL SPINE SURGERY      CYSTOSCOPY  03/13/2018    Dr Mesa     OOPHORECTOMY Right     [place] LAP,SLING OPERATION N/A 4/24/2018    Procedure: INSERTION PUBOVAGINAL SLING (FEMALE) Trans obturator mid urethral sling insertion, CYSTOSCOPY;  Surgeon: Kitty Zavala MD;  Location: [facility];  Service: Urology       Current Outpatient Medications   Medication Sig Dispense Refill    ARIPiprazole (ABILIFY) 10 mg tablet Take 1 tablet (10 mg total) by mouth daily 30 tablet 2    celecoxib (CeleBREX) 200 mg capsule Take 200 mg by mouth 2 (two) times a day      DULoxetine (CYMBALTA) 30 mg delayed release capsule Take 1 capsule (30 mg total) by mouth daily 30 capsule 2    ergocalciferol (VITAMIN D2) 50,000 units Take 50,000 Units by mouth once a week      gabapentin (NEURONTIN) 100 mg capsule Take 1 capsule (100 mg total) by mouth 3 (three) times a day 90 capsule 2    lamoTRIgine (LaMICtal) 100 mg tablet Take 2 tablets (200 mg total) by mouth daily at bedtime 60 tablet 2    omeprazole (PriLOSEC) 20 mg delayed release capsule Take 20 mg by mouth daily      predniSONE 10 mg tablet 5x2, 4x2, 3x2, 2x2, 1x2, then stop 30 tablet 0     No current facility-administered medications for this visit          Allergies   Allergen Reactions    Azithromycin Rash    Erythromycin Rash       Review of Systems    As noted in HPI     Mental status exam:   Speech is clear and coherent, normal rate and volume   Mood is euthymic   Linear and coherent thought process   No suicidal or homicidal ideation   No psychotic signs or symptoms noted, no hallucinations or delusions   Cognition appears stable   Insight and judgment stable     Continue with medication treatment as follows:   add gabapentin 100 mg t i d  for fibromyalgia, anxiety  Referral placed for Rheumatology through [address] with other medications as follows:  Cymbalta 30 mg daily Lamotrigine 200 mg at bedtime   Discontinue lamotrigine 50 mg the morning  Abilify 10 mg daily   Requip 1 mg p r n  basis, discussed discontinuation of this with patient in light of starting gabapentin  Will follow-up in three months and she will call sooner if any questions or concerns  There were no vitals filed for this visit  I spent 25 minutes directly with the patient during this visit, reviewing chart, records and medications    VIRTUAL VISIT Warren Alegre acknowledges that she has consented to an online visit or consultation  She understands that the online visit is based solely on information provided by her, and that, in the absence of a face-to-face physical evaluation by the physician, the diagnosis she receives is both limited and provisional in terms of accuracy and completeness  This is not intended to replace a full medical face-to-face evaluation by the physician  Rio Ladd understands and accepts these terms

## 2021-09-09 ENCOUNTER — TELEPHONE (OUTPATIENT)
Dept: OBGYN CLINIC | Facility: HOSPITAL | Age: 56
End: 2021-09-09

## 2021-10-09 DIAGNOSIS — F31.62 BIPOLAR 1 DISORDER, MIXED, MODERATE (HCC): ICD-10-CM

## 2021-10-09 RX ORDER — LAMOTRIGINE 100 MG/1
200 TABLET ORAL
Qty: 60 TABLET | Refills: 0 | Status: SHIPPED | OUTPATIENT
Start: 2021-10-09 | End: 2021-10-15 | Stop reason: SDUPTHER

## 2021-10-09 RX ORDER — ARIPIPRAZOLE 10 MG/1
10 TABLET ORAL DAILY
Qty: 30 TABLET | Refills: 0 | Status: SHIPPED | OUTPATIENT
Start: 2021-10-09 | End: 2021-10-15 | Stop reason: SDUPTHER

## 2021-10-11 DIAGNOSIS — M79.7 FIBROMYALGIA: ICD-10-CM

## 2021-10-11 DIAGNOSIS — F41.9 ANXIETY: ICD-10-CM

## 2021-10-11 RX ORDER — DULOXETIN HYDROCHLORIDE 30 MG/1
30 CAPSULE, DELAYED RELEASE ORAL DAILY
Qty: 30 CAPSULE | Refills: 0 | Status: SHIPPED | OUTPATIENT
Start: 2021-10-11 | End: 2021-10-15 | Stop reason: SDUPTHER

## 2021-10-15 ENCOUNTER — OFFICE VISIT (OUTPATIENT)
Dept: PSYCHIATRY | Facility: CLINIC | Age: 56
End: 2021-10-15
Payer: MEDICARE

## 2021-10-15 DIAGNOSIS — F31.62 BIPOLAR 1 DISORDER, MIXED, MODERATE (HCC): Primary | ICD-10-CM

## 2021-10-15 DIAGNOSIS — F41.9 ANXIETY: ICD-10-CM

## 2021-10-15 DIAGNOSIS — G25.81 RESTLESS LEG: ICD-10-CM

## 2021-10-15 DIAGNOSIS — M79.7 FIBROMYALGIA: ICD-10-CM

## 2021-10-15 PROCEDURE — 99214 OFFICE O/P EST MOD 30 MIN: CPT | Performed by: PHYSICIAN ASSISTANT

## 2021-10-15 RX ORDER — ARIPIPRAZOLE 10 MG/1
10 TABLET ORAL DAILY
Qty: 30 TABLET | Refills: 3 | Status: SHIPPED | OUTPATIENT
Start: 2021-10-15 | End: 2022-02-18 | Stop reason: SDUPTHER

## 2021-10-15 RX ORDER — GABAPENTIN 100 MG/1
CAPSULE ORAL
Qty: 180 CAPSULE | Refills: 2 | Status: SHIPPED | OUTPATIENT
Start: 2021-10-15 | End: 2022-01-06

## 2021-10-15 RX ORDER — LAMOTRIGINE 25 MG/1
200 TABLET ORAL
Qty: 240 TABLET | Refills: 3 | Status: SHIPPED | OUTPATIENT
Start: 2021-10-15 | End: 2022-02-18 | Stop reason: SDUPTHER

## 2021-10-15 RX ORDER — DULOXETIN HYDROCHLORIDE 30 MG/1
30 CAPSULE, DELAYED RELEASE ORAL DAILY
Qty: 30 CAPSULE | Refills: 3 | Status: SHIPPED | OUTPATIENT
Start: 2021-10-15 | End: 2022-02-18 | Stop reason: SDUPTHER

## 2021-12-07 ENCOUNTER — TELEPHONE (OUTPATIENT)
Dept: RHEUMATOLOGY | Facility: CLINIC | Age: 56
End: 2021-12-07

## 2022-01-23 NOTE — TELEPHONE ENCOUNTER
Called patient and received voicemail- message left to reduce neurontin to 400 mg once a day instead of twice and continue with hs dose the same  pt to call back if any questions   ----- Message from Tung Kwok sent at 12/3/2019  8:25 AM EST -----  Spoke to ross this morning  She rarely takes the ativan  Its as needed  ----- Message -----  From: Andres Barone PA-C  Sent: 12/2/2019   4:39 PM EST  To: Tung Kwok    Please tell her to reduce or stop lorazepam (ativan) before we consider restarting wellbutrin and we will see how she feels then  Thank you!  ----- Message -----  From: Tung Kwok  Sent: 12/2/2019   4:33 PM EST  To: Andres Barnoe PA-C    hilda had a sleeping problem and you switched her meds, but now she says her mood is fine but all she's doing is sleeping   She believes she should go back on the wellbutrin
111

## 2022-02-09 DIAGNOSIS — F31.62 BIPOLAR 1 DISORDER, MIXED, MODERATE (HCC): ICD-10-CM

## 2022-02-09 RX ORDER — LAMOTRIGINE 100 MG/1
TABLET ORAL
Qty: 60 TABLET | Refills: 1 | Status: SHIPPED | OUTPATIENT
Start: 2022-02-09 | End: 2022-02-18

## 2022-02-11 NOTE — TELEPHONE ENCOUNTER
Refill of 120 not appropriate per Kaitlin's note    Will order 90 General: well appearing, NAD  Head: NC, AT  EENT: EOMI, no scleral icterus  Cardiac: tachycardic, no apparent murmurs, no lower extremity edema  Respiratory: decreased BS on R, no respiratory distress   Abdomen: soft, ND, NT, nonperitonitic  MSK/Vascular: full ROM, soft compartments, warm extremities  Neuro: AAOx3, GCS 15   Psych: calm, cooperative

## 2022-02-18 ENCOUNTER — OFFICE VISIT (OUTPATIENT)
Dept: PSYCHIATRY | Facility: CLINIC | Age: 57
End: 2022-02-18
Payer: MEDICARE

## 2022-02-18 DIAGNOSIS — F41.9 ANXIETY: ICD-10-CM

## 2022-02-18 DIAGNOSIS — F31.62 BIPOLAR 1 DISORDER, MIXED, MODERATE (HCC): ICD-10-CM

## 2022-02-18 DIAGNOSIS — M79.7 FIBROMYALGIA: ICD-10-CM

## 2022-02-18 PROCEDURE — 99214 OFFICE O/P EST MOD 30 MIN: CPT | Performed by: PHYSICIAN ASSISTANT

## 2022-02-18 RX ORDER — LAMOTRIGINE 25 MG/1
200 TABLET ORAL
Qty: 240 TABLET | Refills: 5 | Status: SHIPPED | OUTPATIENT
Start: 2022-02-18 | End: 2022-06-17 | Stop reason: SDUPTHER

## 2022-02-18 RX ORDER — GABAPENTIN 100 MG/1
CAPSULE ORAL
Qty: 180 CAPSULE | Refills: 5 | Status: SHIPPED | OUTPATIENT
Start: 2022-02-18 | End: 2022-03-31 | Stop reason: SDUPTHER

## 2022-02-18 RX ORDER — DULOXETIN HYDROCHLORIDE 30 MG/1
30 CAPSULE, DELAYED RELEASE ORAL DAILY
Qty: 30 CAPSULE | Refills: 5 | Status: SHIPPED | OUTPATIENT
Start: 2022-02-18 | End: 2022-04-22 | Stop reason: SDUPTHER

## 2022-02-18 RX ORDER — ARIPIPRAZOLE 10 MG/1
10 TABLET ORAL DAILY
Qty: 30 TABLET | Refills: 5 | Status: SHIPPED | OUTPATIENT
Start: 2022-02-18 | End: 2022-06-17 | Stop reason: SDUPTHER

## 2022-02-18 RX ORDER — MELATONIN
1000 DAILY
COMMUNITY

## 2022-02-18 NOTE — PSYCH
PROGRESS NOTE        [facility]      Name and Date of Birth:  Rekha Riley 60 y o  1965    Date of Visit: 02/18/22    SUBJECTIVE:  Stephanie Taylor was seen for follow-up of bipolar disorder, anxiety and for medication management  She has been doing fairly well  She did very well with gabapentin  States that her moods were better as well as her fibromyalgia pain  Ran out of her prescription although I did send a refill in for this January 6th  She was going to follow with rheumatology through [facility] in December but states that the appointment was canceled because she had already seen a provider for through [facility] and was diagnosed with fibromyalgia  She is planning to see her 's rheumatologist   States that she had been sleeping very well but is slightly reduced now since being off the gabapentin  Her appetite is [employer]  States that she is maintaining her weight around 185 lb  Good stable relationship with her spouse  Has adequate interest and motivation  No significant depressive episodes  No manic episodes  Continues in her sobriety  Taking Cymbalta, Abilify and lamotrigine as prescribed without adverse effects  No new medications or other medical issues noted  Medication list was reviewed and updated  She denies suicidal ideation, intent or plan at present, has no suicidal ideation, intent or plan at present  She denies any auditory hallucinations and visual hallucinations, denies any other delusional thinking, denies any delusional thinking  She denies any side effects from medications      HPI ROS Appetite Changes and Sleep:  Fair appetite, fair sleep    Review Of Systems:      Constitutional Negative   ENT Negative   Cardiovascular Negative   Respiratory Negative   Gastrointestinal Negative   Genitourinary Negative   Musculoskeletal Negative   Integumentary Negative   Neurological Negative   Endocrine Negative   Other Symptoms Negative and None       Laboratory Results: No results found for this or any previous visit      Substance Abuse History:    Social History     Substance and Sexual Activity   Drug Use No       Family Psychiatric History:     Family History   Problem Relation Age of Onset    No Known Problems Father     No Known Problems Mother        The following portions of the patient's history were reviewed and updated as appropriate: past family history, past medical history, past social history, past surgical history and problem list     Social History     Socioeconomic History    Marital status: /Civil Union     Spouse name: Not on file    Number of children: Not on file    Years of education: Not on file    Highest education level: Not on file   Occupational History    Not on file   Tobacco Use    Smoking status: Former Smoker     Packs/day: 0 50     Types: Cigarettes    Smokeless tobacco: Never Used   Vaping Use    Vaping Use: Never used   Substance and Sexual Activity    Alcohol use: Yes     Comment: social     Drug use: No    Sexual activity: Not on file   Other Topics Concern    Not on file   Social History Narrative    Not on file     Social Determinants of Health     Financial Resource Strain: Not on file   Food Insecurity: Not on file   Transportation Needs: Not on file   Physical Activity: Not on file   Stress: Not on file   Social Connections: Not on file   Intimate Partner Violence: Not on file   Housing Stability: Not on file     Social History     Social History Narrative    Not on file        Social History     Tobacco History     Smoking Status  Former Smoker Smoking Frequency  0 5 packs/day Smoking Tobacco Type  Cigarettes    Smokeless Tobacco Use  Never Used          Alcohol History     Alcohol Use Status  Yes Comment  social           Drug Use     Drug Use Status  No          Sexual Activity     Sexually Active  Not Asked Activities of Daily Living    Not Asked                     OBJECTIVE:     Mental Status Evaluation:    Appearance age appropriate, casually dressed   Behavior pleasant, cooperative   Speech normal volume, normal pitch   Mood euthymic   Affect appropriate   Thought Processes logical   Associations intact associations   Thought Content normal   Perceptual Disturbances: none   Abnormal Thoughts  Risk Potential Suicidal ideation - None  Homicidal ideation - None  Potential for aggression - No   Orientation oriented to person, place, time/date and situation   Memory recent and remote memory grossly intact   Cosciousness alert and awake   Attention Span attention span and concentration are age appropriate   Intellect Not formally assessed   Insight age appropriate    Judgement good    Muscle Strength and  Gait Steady gait   Language no difficulty naming common objects   Fund of Knowledge displays adequate knowledge of current events   Pain Fibromyalgia   Pain Scale Pain       Assessment/Plan:       Diagnoses and all orders for this visit:    Bipolar 1 disorder, mixed, moderate (HCC)  -     gabapentin (NEURONTIN) 100 mg capsule; TAKE TWO CAPSULES BY MOUTH THREE TIMES A DAY  -     lamoTRIgine (LaMICtal) 25 mg tablet; Take 8 tablets (200 mg total) by mouth daily at bedtime  -     ARIPiprazole (ABILIFY) 10 mg tablet; Take 1 tablet (10 mg total) by mouth daily    Anxiety  -     gabapentin (NEURONTIN) 100 mg capsule; TAKE TWO CAPSULES BY MOUTH THREE TIMES A DAY  -     DULoxetine (CYMBALTA) 30 mg delayed release capsule; Take 1 capsule (30 mg total) by mouth daily    Fibromyalgia  -     gabapentin (NEURONTIN) 100 mg capsule; TAKE TWO CAPSULES BY MOUTH THREE TIMES A DAY  -     DULoxetine (CYMBALTA) 30 mg delayed release capsule; Take 1 capsule (30 mg total) by mouth daily    Other orders  -     cholecalciferol (VITAMIN D3) 1,000 units tablet;  Take 1,000 Units by mouth daily          Treatment Recommendations/Precautions:  Gabapentin 200 mg 3 times a day  Cymbalta 30 mg daily  Lamotrigine 200 mg at bedtime, taking 25 mg eat at bedtime due to difficulty swallowing 200 mg pill  Abilify 10 mg daily  Follow up 3-4 months she will call me sooner if any questions or concerns  Continue current medications    Risks/Benefits      Risks, Benefits And Possible Side Effects Of Medications:    Risks, benefits, and possible side effects of medications explained to patient and patient verbalizes understanding and agreement for treatment      Controlled Medication Discussion:     Not applicable    Psychotherapy Provided:     Individual psychotherapy provided: No

## 2022-03-31 ENCOUNTER — TELEPHONE (OUTPATIENT)
Dept: PSYCHIATRY | Facility: CLINIC | Age: 57
End: 2022-03-31

## 2022-03-31 DIAGNOSIS — F31.62 BIPOLAR 1 DISORDER, MIXED, MODERATE (HCC): ICD-10-CM

## 2022-03-31 DIAGNOSIS — M79.7 FIBROMYALGIA: ICD-10-CM

## 2022-03-31 DIAGNOSIS — F41.9 ANXIETY: ICD-10-CM

## 2022-03-31 RX ORDER — GABAPENTIN 300 MG/1
CAPSULE ORAL
Qty: 90 CAPSULE | Refills: 2 | Status: SHIPPED | OUTPATIENT
Start: 2022-03-31 | End: 2022-06-17 | Stop reason: SDUPTHER

## 2022-03-31 NOTE — TELEPHONE ENCOUNTER
Spoke with Mateo Kim regarding medication  States that she has been feeling much better with her mood and questioning titration  She has not had any adverse effects with the 200 mg 3 times a day  Will increase gabapentin to 300 mg 3 times a day  All questions were answered and she will follow-up as previously scheduled in June    She will call me sooner if she has any questions or concerns

## 2022-03-31 NOTE — TELEPHONE ENCOUNTER
Miguelina would like to know if she can increase her gabapentin, because it's been helping her mood and sleep

## 2022-04-22 ENCOUNTER — OFFICE VISIT (OUTPATIENT)
Dept: PSYCHIATRY | Facility: CLINIC | Age: 57
End: 2022-04-22
Payer: MEDICARE

## 2022-04-22 DIAGNOSIS — M79.7 FIBROMYALGIA: ICD-10-CM

## 2022-04-22 DIAGNOSIS — F41.9 ANXIETY: ICD-10-CM

## 2022-04-22 DIAGNOSIS — F31.62 BIPOLAR 1 DISORDER, MIXED, MODERATE (HCC): Primary | ICD-10-CM

## 2022-04-22 DIAGNOSIS — G25.81 RESTLESS LEG: ICD-10-CM

## 2022-04-22 PROCEDURE — 99214 OFFICE O/P EST MOD 30 MIN: CPT | Performed by: PHYSICIAN ASSISTANT

## 2022-04-22 RX ORDER — DULOXETIN HYDROCHLORIDE 60 MG/1
60 CAPSULE, DELAYED RELEASE ORAL DAILY
Qty: 30 CAPSULE | Refills: 2 | Status: SHIPPED | OUTPATIENT
Start: 2022-04-22 | End: 2022-06-17 | Stop reason: SDUPTHER

## 2022-04-22 NOTE — BH TREATMENT PLAN
TREATMENT PLAN (Medication Management Only)        [facility]    Name and Date of Birth:  Miky Bowers 60 y o  1965  Date of Treatment Plan: April 22, 2022  Diagnosis/Diagnoses:    1  Bipolar 1 disorder, mixed, moderate ([address] )    2  Anxiety    3  Fibromyalgia    4  Restless leg      Strengths/Personal Resources for Self-Care: "spouse, embroidery"  Area/Areas of need (in own words): "feeling better"  1  Long Term Goal: improve control of depression  Target Date:3 months - 7/22/2022  Person/Persons responsible for completion of goal: Miguelina  2  Short Term Objective (s) - How will we reach this goal?:   A  Provider new recommended medication/dosage changes and/or continue medication(s): increase cymbalta  B  increase physical activity  C  follow up with PCP  Target Date:3 months - 7/22/2022  Person/Persons Responsible for Completion of Goal: Miguelina  Progress Towards Goals: continuing treatment  Treatment Modality: medication management every 3 months  Review due 180 days from date of this plan: 6 months - 10/22/2022  Expected length of service: ongoing treatment  My Physician/PA/NP and I have developed this plan together and I agree to work on the goals and objectives  I understand the treatment goals that were developed for my treatment

## 2022-04-22 NOTE — PSYCH
PROGRESS NOTE        [facility]      Name and Date of Birth:  Chelsea Mendez 60 y o  1965    Date of Visit: 04/22/22    SUBJECTIVE:  Keke Clark was seen for follow-up of bipolar disorder, anxiety and for medication management  She is feeling more depressed past few months  States it has been gradually worsening  Sleeping much of the day and only sleeping 5 hours  Less energy and less drive  Appetite is less and she is not hungry  No manic s/s  States since being on gabapentin she is not feeling as angry  Her anxiety has been stable and manageable  Continues with chronic pain from fibromyalgia but states maybe a slight improvement with gabapentin  Denies any adverse effects with her medications  Has been taking her medications as prescribed including Lamictal 200 mg daily, Abilify 10 mg daily, gabapentin 300 mg t i d  and Cymbalta 30 mg daily  She has good relationship with spouse  Denies any external stressors  Medication list reviewed and updated  States that she has not followed with her family doctor in a couple years  Encourage her to do that  Her last vitamin-D level one year ago was low at 11  She was given prescription vitamin-D and then to take over-the-counter supplements  Encouraged to restart the over-the-counter supplements since low vitamin-D could also be contributing to depressive symptoms and lethargy  She denies suicidal ideation, intent or plan at present, has no suicidal ideation, intent or plan at present  She denies any auditory hallucinations and visual hallucinations, denies any other delusional thinking, denies any delusional thinking  She denies any side effects from medications      HPI ROS Appetite Changes and Sleep:  Decreased appetite, increased sleep    Review Of Systems:      Constitutional Negative   ENT Negative   Cardiovascular Negative   Respiratory Negative   Gastrointestinal Negative   Genitourinary Negative   Musculoskeletal Negative   Integumentary Negative   Neurological Negative   Endocrine Negative   Other Symptoms Negative and None       Laboratory Results: No results found for this or any previous visit      Substance Abuse History:    Social History     Substance and Sexual Activity   Drug Use No       Family Psychiatric History:     Family History   Problem Relation Age of Onset    No Known Problems Father     No Known Problems Mother        The following portions of the patient's history were reviewed and updated as appropriate: past family history, past medical history, past social history, past surgical history and problem list     Social History     Socioeconomic History    Marital status: /Civil Union     Spouse name: Not on file    Number of children: Not on file    Years of education: Not on file    Highest education level: Not on file   Occupational History    Not on file   Tobacco Use    Smoking status: Former Smoker     Packs/day: 0 50     Types: Cigarettes    Smokeless tobacco: Never Used   Vaping Use    Vaping Use: Never used   Substance and Sexual Activity    Alcohol use: Yes     Comment: social     Drug use: No    Sexual activity: Not on file   Other Topics Concern    Not on file   Social History Narrative    Not on file     Social Determinants of Health     Financial Resource Strain: Not on file   Food Insecurity: Not on file   Transportation Needs: Not on file   Physical Activity: Not on file   Stress: Not on file   Social Connections: Not on file   Intimate Partner Violence: Not on file   Housing Stability: Not on file     Social History     Social History Narrative    Not on file        Social History     Tobacco History     Smoking Status  Former Smoker Smoking Frequency  0 5 packs/day Smoking Tobacco Type  Cigarettes    Smokeless Tobacco Use  Never Used          Alcohol History     Alcohol Use Status  Yes Comment  social           Drug Use     Drug Use Status  No          Sexual Activity     Sexually Active  Not Asked          Activities of Daily Living    Not Asked                     OBJECTIVE:     Mental Status Evaluation:    Appearance age appropriate, casually dressed   Behavior Calm, cooperative   Speech normal volume, normal pitch   Mood Depressed   Affect Congruent   Thought Processes Poverty of thought   Associations intact associations   Thought Content normal   Perceptual Disturbances: none   Abnormal Thoughts  Risk Potential Suicidal ideation - None  Homicidal ideation - None  Potential for aggression - No   Orientation oriented to person, place, time/date and situation   Memory recent and remote memory grossly intact   Cosciousness alert and awake   Attention Span attention span and concentration are age appropriate   Intellect Not formally assessed   Insight age appropriate    Judgement good    Muscle Strength and  Gait Steady gait   Language no difficulty naming common objects   Fund of Knowledge displays adequate knowledge of current events   Pain Chronic fibromyalgia pain   Pain Scale Pain       Assessment/Plan:       Diagnoses and all orders for this visit:    Bipolar 1 disorder, mixed, moderate (HCC)    Anxiety  -     DULoxetine (CYMBALTA) 60 mg delayed release capsule; Take 1 capsule (60 mg total) by mouth daily    Fibromyalgia  -     DULoxetine (CYMBALTA) 60 mg delayed release capsule;  Take 1 capsule (60 mg total) by mouth daily    Restless leg          Treatment Recommendations/Precautions:  Increase Cymbalta 60 mg daily  Discussed concerns for increased mood lability with increased   Spouse is supportive and will also monitor for any mood changes     Lamotrigine 200 mg total daily  Gabapentin 300 mg 3 times a day  Abilify 10 mg daily  Also encourage follow-up with PCP and resuming vitamin-D supplementation as ordered  Will follow-up in one month she will call me sooner if any questions or concerns        Risks/Benefits Risks, Benefits And Possible Side Effects Of Medications:    Risks, benefits, and possible side effects of medications explained to patient and patient verbalizes understanding and agreement for treatment      Controlled Medication Discussion:     Not applicable    Psychotherapy Provided:     Individual psychotherapy provided: No

## 2022-06-09 ENCOUNTER — TELEPHONE (OUTPATIENT)
Dept: PSYCHIATRY | Facility: CLINIC | Age: 57
End: 2022-06-09

## 2022-06-17 ENCOUNTER — OFFICE VISIT (OUTPATIENT)
Dept: PSYCHIATRY | Facility: CLINIC | Age: 57
End: 2022-06-17
Payer: MEDICARE

## 2022-06-17 DIAGNOSIS — F41.9 ANXIETY: ICD-10-CM

## 2022-06-17 DIAGNOSIS — M79.7 FIBROMYALGIA: ICD-10-CM

## 2022-06-17 DIAGNOSIS — G25.81 RESTLESS LEG: ICD-10-CM

## 2022-06-17 DIAGNOSIS — F31.62 BIPOLAR 1 DISORDER, MIXED, MODERATE (HCC): Primary | ICD-10-CM

## 2022-06-17 PROCEDURE — 99214 OFFICE O/P EST MOD 30 MIN: CPT | Performed by: PHYSICIAN ASSISTANT

## 2022-06-17 RX ORDER — GABAPENTIN 300 MG/1
CAPSULE ORAL
Qty: 360 CAPSULE | Refills: 1 | Status: SHIPPED | OUTPATIENT
Start: 2022-06-17 | End: 2022-07-18 | Stop reason: SDUPTHER

## 2022-06-17 RX ORDER — DULOXETIN HYDROCHLORIDE 60 MG/1
60 CAPSULE, DELAYED RELEASE ORAL DAILY
Qty: 90 CAPSULE | Refills: 1 | Status: SHIPPED | OUTPATIENT
Start: 2022-06-17

## 2022-06-17 RX ORDER — ARIPIPRAZOLE 10 MG/1
10 TABLET ORAL DAILY
Qty: 90 TABLET | Refills: 1 | Status: SHIPPED | OUTPATIENT
Start: 2022-06-17

## 2022-06-17 RX ORDER — LAMOTRIGINE 25 MG/1
200 TABLET ORAL
Qty: 720 TABLET | Refills: 1 | Status: SHIPPED | OUTPATIENT
Start: 2022-06-17

## 2022-06-17 RX ORDER — OMEPRAZOLE 10 MG/1
10 CAPSULE, DELAYED RELEASE ORAL DAILY
COMMUNITY

## 2022-06-17 NOTE — PSYCH
PROGRESS NOTE        [facility]      Name and Date of Birth:  Bruna Cordova 59 y o  1965    Date of Visit: 06/17/22    SUBJECTIVE:  [employer] was seen for follow-up of bipolar disorder, anxiety and for medication management  At her last visit Cymbalta was increased to 60 mg daily  Gabapentin was also titrated to 300 mg 4 times a day  Overall ports that she has been feeling much better  Motivation has been much improved  Has been more active cleaning and getting outside for walks with the dog every day  Feeling less depressed and less anxious  She is sleeping well at night  States that since being on gabapentin her restless leg has also improved  Appetite is good  States that she has lost some weight since she has been more active  Taking medications as prescribed  Denies any adverse effects  No evidence of joe  Good relationship with her spouse  Thierry's daughter will be moving back in with them in August for about a year  Miguelina said that they had a conversation and have worked through their differences  No new medications are medical issues  Continues with chronic pain from fibromyalgia  Was seen by rheumatology earlier in June and is following with them again in six months  She denies suicidal ideation, intent or plan at present, has no suicidal ideation, intent or plan at present  She denies any auditory hallucinations and visual hallucinations, denies any other delusional thinking, denies any delusional thinking  She denies any side effects from medications      HPI ROS Appetite Changes and Sleep: normal appetite, normal sleep    Review Of Systems:      Constitutional Negative   ENT Negative   Cardiovascular Negative   Respiratory Negative   Gastrointestinal Negative   Genitourinary Negative   Musculoskeletal Negative   Integumentary Negative   Neurological Negative   Endocrine Negative   Other Symptoms Negative and None       Laboratory Results: No results found for this or any previous visit      Substance Abuse History:    Social History     Substance and Sexual Activity   Drug Use No       Family Psychiatric History:     Family History   Problem Relation Age of Onset    No Known Problems Father     No Known Problems Mother        The following portions of the patient's history were reviewed and updated as appropriate: past family history, past medical history, past social history, past surgical history and problem list     Social History     Socioeconomic History    Marital status: /Civil Union     Spouse name: Not on file    Number of children: Not on file    Years of education: Not on file    Highest education level: Not on file   Occupational History    Not on file   Tobacco Use    Smoking status: Former Smoker     Packs/day: 0 50     Types: Cigarettes    Smokeless tobacco: Never Used   Vaping Use    Vaping Use: Never used   Substance and Sexual Activity    Alcohol use: Yes     Comment: social     Drug use: No    Sexual activity: Not on file   Other Topics Concern    Not on file   Social History Narrative    Not on file     Social Determinants of Health     Financial Resource Strain: Not on file   Food Insecurity: Not on file   Transportation Needs: Not on file   Physical Activity: Not on file   Stress: Not on file   Social Connections: Not on file   Intimate Partner Violence: Not on file   Housing Stability: Not on file     Social History     Social History Narrative    Not on file        Social History     Tobacco History     Smoking Status  Former Smoker Smoking Frequency  0 5 packs/day Smoking Tobacco Type  Cigarettes    Smokeless Tobacco Use  Never Used          Alcohol History     Alcohol Use Status  Yes Comment  social           Drug Use     Drug Use Status  No          Sexual Activity     Sexually Active  Not Asked          Activities of Daily Living    Not Asked OBJECTIVE:     Mental Status Evaluation:    Appearance age appropriate, casually dressed   Behavior pleasant, cooperative   Speech normal volume, normal pitch   Mood Euthymic   Affect Appropriate   Thought Processes logical   Associations intact associations   Thought Content normal   Perceptual Disturbances: none   Abnormal Thoughts  Risk Potential Suicidal ideation - None  Homicidal ideation - None  Potential for aggression - No   Orientation oriented to person, place, time/date and situation   Memory recent and remote memory grossly intact   Cosciousness alert and awake   Attention Span attention span and concentration are age appropriate   Intellect Not formally assessed   Insight age appropriate    Judgement good    Muscle Strength and  Gait muscle strength and tone were normal   Language no difficulty naming common objects   Fund of Knowledge displays adequate knowledge of current events   Pain Chronic fibromyalgia pain   Pain Scale Pain       Assessment/Plan:       Diagnoses and all orders for this visit:    Bipolar 1 disorder, mixed, moderate (HCC)  -     gabapentin (NEURONTIN) 300 mg capsule; TAKE ONE CAPSULE BY MOUTH FOUR TIMES A DAY  -     ARIPiprazole (ABILIFY) 10 mg tablet; Take 1 tablet (10 mg total) by mouth daily  -     lamoTRIgine (LaMICtal) 25 mg tablet; Take 8 tablets (200 mg total) by mouth daily at bedtime    Anxiety  -     gabapentin (NEURONTIN) 300 mg capsule; TAKE ONE CAPSULE BY MOUTH FOUR TIMES A DAY  -     DULoxetine (CYMBALTA) 60 mg delayed release capsule; Take 1 capsule (60 mg total) by mouth daily    Fibromyalgia  -     gabapentin (NEURONTIN) 300 mg capsule; TAKE ONE CAPSULE BY MOUTH FOUR TIMES A DAY  -     DULoxetine (CYMBALTA) 60 mg delayed release capsule; Take 1 capsule (60 mg total) by mouth daily    Restless leg    Other orders  -     omeprazole (PriLOSEC) 10 mg delayed release capsule;  Take 10 mg by mouth daily          Treatment Recommendations/Precautions:  Gabapentin 300 mg q i d  Abilify 10 mg daily   Lamictal 200 mg daily  Cymbalta 60 mg daily    Follow-up in three months and she will call sooner if any questions or concerns    Continue current medications    Risks/Benefits      Risks, Benefits And Possible Side Effects Of Medications:    Risks, benefits, and possible side effects of medications explained to patient and patient verbalizes understanding and agreement for treatment      Controlled Medication Discussion:     Not applicable    Psychotherapy Provided:     Individual psychotherapy provided: No

## 2022-07-18 ENCOUNTER — TELEPHONE (OUTPATIENT)
Dept: PSYCHIATRY | Facility: CLINIC | Age: 57
End: 2022-07-18

## 2022-07-18 DIAGNOSIS — F31.62 BIPOLAR 1 DISORDER, MIXED, MODERATE (HCC): ICD-10-CM

## 2022-07-18 DIAGNOSIS — F41.9 ANXIETY: ICD-10-CM

## 2022-07-18 DIAGNOSIS — M79.7 FIBROMYALGIA: ICD-10-CM

## 2022-07-18 RX ORDER — GABAPENTIN 400 MG/1
CAPSULE ORAL
Qty: 360 CAPSULE | Refills: 1 | Status: SHIPPED | OUTPATIENT
Start: 2022-07-18

## 2022-07-18 NOTE — TELEPHONE ENCOUNTER
Returned [facility] call and informed her that Catalina Neil sent in a script with an increase in her Gabapentin dose to 400 MG four times a day

## 2022-09-12 ENCOUNTER — TELEPHONE (OUTPATIENT)
Dept: PSYCHIATRY | Facility: CLINIC | Age: 57
End: 2022-09-12

## 2022-09-12 NOTE — TELEPHONE ENCOUNTER
Received telephone call from patient requesting an increase in her gabapentin  States her fibromyalgia gets worse this time of year    If agreeable, will need a new script sent to [facility] [address] [place] [address]

## 2022-09-14 ENCOUNTER — TELEPHONE (OUTPATIENT)
Dept: PSYCHIATRY | Facility: CLINIC | Age: 57
End: 2022-09-14

## 2022-09-23 ENCOUNTER — OFFICE VISIT (OUTPATIENT)
Dept: PSYCHIATRY | Facility: CLINIC | Age: 57
End: 2022-09-23
Payer: MEDICARE

## 2022-09-23 VITALS — BODY MASS INDEX: 26.68 KG/M2 | HEIGHT: 67 IN | WEIGHT: 170 LBS

## 2022-09-23 DIAGNOSIS — M79.7 FIBROMYALGIA: ICD-10-CM

## 2022-09-23 DIAGNOSIS — Z79.899 OTHER LONG TERM (CURRENT) DRUG THERAPY: ICD-10-CM

## 2022-09-23 DIAGNOSIS — Z51.81 MEDICATION MONITORING ENCOUNTER: ICD-10-CM

## 2022-09-23 DIAGNOSIS — F41.9 ANXIETY: ICD-10-CM

## 2022-09-23 DIAGNOSIS — E66.3 OVERWEIGHT: ICD-10-CM

## 2022-09-23 DIAGNOSIS — E55.9 VITAMIN D DEFICIENCY: ICD-10-CM

## 2022-09-23 DIAGNOSIS — F31.62 BIPOLAR 1 DISORDER, MIXED, MODERATE (HCC): Primary | ICD-10-CM

## 2022-09-23 PROCEDURE — 99214 OFFICE O/P EST MOD 30 MIN: CPT | Performed by: PHYSICIAN ASSISTANT

## 2022-09-23 RX ORDER — LAMOTRIGINE 25 MG/1
200 TABLET ORAL
Qty: 720 TABLET | Refills: 1 | Status: SHIPPED | OUTPATIENT
Start: 2022-09-23

## 2022-09-23 RX ORDER — GABAPENTIN 600 MG/1
600 TABLET ORAL 3 TIMES DAILY
Qty: 90 TABLET | Refills: 2 | Status: SHIPPED | OUTPATIENT
Start: 2022-09-23

## 2022-09-23 RX ORDER — ARIPIPRAZOLE 10 MG/1
10 TABLET ORAL DAILY
Qty: 90 TABLET | Refills: 1 | Status: SHIPPED | OUTPATIENT
Start: 2022-09-23

## 2022-09-23 RX ORDER — DULOXETIN HYDROCHLORIDE 60 MG/1
60 CAPSULE, DELAYED RELEASE ORAL DAILY
Qty: 90 CAPSULE | Refills: 1 | Status: SHIPPED | OUTPATIENT
Start: 2022-09-23

## 2022-09-23 NOTE — PSYCH
PROGRESS NOTE        [facility]      Name and Date of Birth:  Fior Smith 59 y o  1965    Date of Visit: 09/23/22    SUBJECTIVE:  Zach Gonsales was seen for follow-up of bipolar disorder, anxiety and for medication management  Reports that she has been stable with her moods  No significant depressive episodes or anxiety attacks  Feels as though her medications have been working well  Unfortunately physically she reports that she has increased generalized pain due to fibromyalgia  Gabapentin has been somewhat helpful but recently her pain has been significantly exacerbated from this  She is sleeping adequately  Watching her diet and has lost some weight  Taking medications as prescribed  Her 's daughter, Linus Hoyos has moved in with them and she states this is going better than I expected"  Good relationship with her spouse  She denies suicidal ideation, intent or plan at present, has no suicidal ideation, intent or plan at present  She denies any auditory hallucinations and visual hallucinations, denies any other delusional thinking, denies any delusional thinking  She denies any side effects from medications    HPI ROS Appetite Changes and Sleep: normal appetite, normal sleep    Review Of Systems:      Constitutional Negative   ENT Negative   Cardiovascular Negative   Respiratory Negative   Gastrointestinal Negative   Genitourinary Negative   Musculoskeletal Generalized muscular pain   Integumentary Negative   Neurological Negative   Endocrine Negative   Other Symptoms Negative and None       Laboratory Results: No results found for this or any previous visit      Substance Abuse History:    Social History     Substance and Sexual Activity   Drug Use No       Family Psychiatric History:     Family History   Problem Relation Age of Onset    No Known Problems Father     No Known Problems Mother        The following portions of the patient's history were reviewed and updated as appropriate: past family history, past medical history, past social history, past surgical history and problem list     Social History     Socioeconomic History    Marital status: /Civil Union     Spouse name: Not on file    Number of children: Not on file    Years of education: Not on file    Highest education level: Not on file   Occupational History    Not on file   Tobacco Use    Smoking status: Former Smoker     Packs/day: 0 50     Types: Cigarettes    Smokeless tobacco: Never Used   Vaping Use    Vaping Use: Never used   Substance and Sexual Activity    Alcohol use: Yes     Comment: social     Drug use: No    Sexual activity: Not on file   Other Topics Concern    Not on file   Social History Narrative    Not on file     Social Determinants of Health     Financial Resource Strain: Not on file   Food Insecurity: Not on file   Transportation Needs: Not on file   Physical Activity: Not on file   Stress: Not on file   Social Connections: Not on file   Intimate Partner Violence: Not on file   Housing Stability: Not on file     Social History     Social History Narrative    Not on file        Social History     Tobacco History     Smoking Status  Former Smoker Smoking Frequency  0 5 packs/day Smoking Tobacco Type  Cigarettes    Smokeless Tobacco Use  Never Used          Alcohol History     Alcohol Use Status  Yes Comment  social           Drug Use     Drug Use Status  No          Sexual Activity     Sexually Active  Not Asked          Activities of Daily Living    Not Asked                     OBJECTIVE:     Mental Status Evaluation:    Appearance age appropriate, casually dressed   Behavior pleasant, cooperative   Speech normal volume, normal pitch   Mood Euthymic   Affect Congruent   Thought Processes logical   Associations intact associations   Thought Content normal   Perceptual Disturbances: none   Abnormal Thoughts  Risk Potential Suicidal ideation - None  Homicidal ideation - None  Potential for aggression - No   Orientation oriented to person, place, time/date and situation   Memory recent and remote memory grossly intact   Cosciousness alert and awake   Attention Span attention span and concentration are age appropriate   Intellect Not formally assessed   Insight age appropriate    Judgement good    Muscle Strength and  Gait Steady gait    Language no difficulty naming common objects   Fund of Knowledge displays adequate knowledge of current events   Pain Generalized muscular pain   Pain Scale Pain       Assessment/Plan:       Diagnoses and all orders for this visit:    Bipolar 1 disorder, mixed, moderate (HCC)  -     Comprehensive metabolic panel; Future  -     Lipid Panel with Direct LDL reflex; Future  -     Vitamin D 25 hydroxy; Future  -     HEMOGLOBIN A1C W/ EAG ESTIMATION; Future  -     CBC and differential; Future  -     gabapentin (Neurontin) 600 MG tablet; Take 1 tablet (600 mg total) by mouth 3 (three) times a day  -     lamoTRIgine (LaMICtal) 25 mg tablet; Take 8 tablets (200 mg total) by mouth daily at bedtime  -     ARIPiprazole (ABILIFY) 10 mg tablet; Take 1 tablet (10 mg total) by mouth daily    Anxiety  -     DULoxetine (CYMBALTA) 60 mg delayed release capsule; Take 1 capsule (60 mg total) by mouth daily    Fibromyalgia  -     Comprehensive metabolic panel; Future  -     Lipid Panel with Direct LDL reflex; Future  -     Vitamin D 25 hydroxy; Future  -     HEMOGLOBIN A1C W/ EAG ESTIMATION; Future  -     CBC and differential; Future  -     gabapentin (Neurontin) 600 MG tablet; Take 1 tablet (600 mg total) by mouth 3 (three) times a day  -     DULoxetine (CYMBALTA) 60 mg delayed release capsule; Take 1 capsule (60 mg total) by mouth daily    Vitamin D deficiency  -     Vitamin D 25 hydroxy; Future    Medication monitoring encounter  -     Comprehensive metabolic panel;  Future  -     Lipid Panel with Direct LDL reflex; Future  -     Vitamin D 25 hydroxy; Future  -     HEMOGLOBIN A1C W/ EAG ESTIMATION; Future  -     CBC and differential; Future    Overweight   -     Lipid Panel with Direct LDL reflex; Future    Other long term (current) drug therapy   -     HEMOGLOBIN A1C W/ EAG ESTIMATION; Future          Treatment Recommendations/Precautions:  Gabapentin 400 mg q i d  changed to 600 mg t i d    Discussed if no improvement will increase to 800 mg t i d  Also discussed need to check labs and patient is in agreement    Continue with other medications as follows:  Abilify 10 mg daily  Lamictal 200 mg daily   Cymbalta 60 mg daily  Vitamin D supplement  Follow-up in three months she will call me sooner if any questions or concerns    Risks/Benefits      Risks, Benefits And Possible Side Effects Of Medications:    Risks, benefits, and possible side effects of medications explained to patient and patient verbalizes understanding and agreement for treatment      Controlled Medication Discussion:     Not applicable    Psychotherapy Provided:     Individual psychotherapy provided: No

## 2022-10-21 ENCOUNTER — TELEPHONE (OUTPATIENT)
Dept: PSYCHIATRY | Facility: CLINIC | Age: 57
End: 2022-10-21

## 2022-10-21 NOTE — TELEPHONE ENCOUNTER
Pt phoned in and left a vm wanting to speak with her provider about lab results  Please reach out to her when you are able please

## 2022-11-10 ENCOUNTER — TELEPHONE (OUTPATIENT)
Dept: PSYCHIATRY | Facility: CLINIC | Age: 57
End: 2022-11-10

## 2022-11-10 NOTE — TELEPHONE ENCOUNTER
Michelle Mcintyre's call regarding recommendation to assist with sleep  Informed her that Dr Grisel Perry is recommending OTC Melatonin between 3-6 mg, chamomile, benadryl 25-50 mg or a form of doxylamine such as Unisom  Instructed her to call the office if she has further concerns

## 2022-12-23 DIAGNOSIS — M79.7 FIBROMYALGIA: ICD-10-CM

## 2022-12-23 DIAGNOSIS — F51.01 PRIMARY INSOMNIA: Primary | ICD-10-CM

## 2022-12-23 DIAGNOSIS — F31.62 BIPOLAR 1 DISORDER, MIXED, MODERATE (HCC): ICD-10-CM

## 2022-12-23 DIAGNOSIS — F41.9 ANXIETY: ICD-10-CM

## 2022-12-23 RX ORDER — GABAPENTIN 600 MG/1
600 TABLET ORAL 3 TIMES DAILY
Qty: 270 TABLET | Refills: 1 | Status: SHIPPED | OUTPATIENT
Start: 2022-12-23

## 2022-12-23 RX ORDER — GABAPENTIN 400 MG/1
CAPSULE ORAL
Qty: 360 CAPSULE | Refills: 1 | OUTPATIENT
Start: 2022-12-23

## 2022-12-23 RX ORDER — GABAPENTIN 600 MG/1
600 TABLET ORAL 3 TIMES DAILY
Qty: 90 TABLET | Refills: 2 | Status: CANCELLED | OUTPATIENT
Start: 2022-12-23

## 2022-12-23 RX ORDER — TRAZODONE HYDROCHLORIDE 50 MG/1
TABLET ORAL
Qty: 60 TABLET | Refills: 1 | Status: SHIPPED | OUTPATIENT
Start: 2022-12-23

## 2023-01-08 DIAGNOSIS — F31.62 BIPOLAR 1 DISORDER, MIXED, MODERATE (HCC): ICD-10-CM

## 2023-01-08 DIAGNOSIS — F41.9 ANXIETY: ICD-10-CM

## 2023-01-08 DIAGNOSIS — M79.7 FIBROMYALGIA: ICD-10-CM

## 2023-01-09 RX ORDER — DULOXETIN HYDROCHLORIDE 60 MG/1
CAPSULE, DELAYED RELEASE ORAL
Qty: 90 CAPSULE | Refills: 1 | Status: SHIPPED | OUTPATIENT
Start: 2023-01-09

## 2023-01-09 RX ORDER — ARIPIPRAZOLE 10 MG/1
TABLET ORAL
Qty: 90 TABLET | Refills: 1 | Status: SHIPPED | OUTPATIENT
Start: 2023-01-09 | End: 2023-01-20 | Stop reason: SDUPTHER

## 2023-01-20 ENCOUNTER — OFFICE VISIT (OUTPATIENT)
Dept: PSYCHIATRY | Facility: CLINIC | Age: 58
End: 2023-01-20

## 2023-01-20 DIAGNOSIS — F51.01 PRIMARY INSOMNIA: ICD-10-CM

## 2023-01-20 DIAGNOSIS — F31.62 BIPOLAR 1 DISORDER, MIXED, MODERATE (HCC): ICD-10-CM

## 2023-01-20 DIAGNOSIS — M79.7 FIBROMYALGIA: Primary | ICD-10-CM

## 2023-01-20 RX ORDER — LAMOTRIGINE 25 MG/1
200 TABLET ORAL
Qty: 720 TABLET | Refills: 1 | Status: SHIPPED | OUTPATIENT
Start: 2023-01-20

## 2023-01-20 RX ORDER — TRAZODONE HYDROCHLORIDE 50 MG/1
TABLET ORAL
Qty: 270 TABLET | Refills: 1 | Status: SHIPPED | OUTPATIENT
Start: 2023-01-20

## 2023-01-20 RX ORDER — GABAPENTIN 300 MG/1
CAPSULE ORAL
Qty: 540 CAPSULE | Refills: 1 | Status: SHIPPED | OUTPATIENT
Start: 2023-01-20

## 2023-01-20 RX ORDER — ARIPIPRAZOLE 10 MG/1
10 TABLET ORAL DAILY
Qty: 90 TABLET | Refills: 1 | Status: SHIPPED | OUTPATIENT
Start: 2023-01-20

## 2023-01-20 NOTE — PSYCH
PROGRESS NOTE        [facility]      Name and Date of Birth:  Laura Majano 59 y o  1965    Date of Visit: 01/20/23    SUBJECTIVE:    Joss Pino Was seen for follow-up of bipolar disorder, anxiety, fibromyalgia and for medication management  Overall has been doing fairly well with her moods  States that her sleep is improved with trazodone  She is taking 100 mg at night  States that she is still only sleeping 4 to 7 hours  Not feeling overly lethargic during the day  Has adequate interest and motivation  No significant depressive episodes  No manic episodes  States that she does have increased anxiety at times  Stressors related to her daughters with whom she does not have contact with  States that her daughter is upset with her and said that she did not want any contact with Miguelina  Joss Pino reaches out to her via text message that she has not heard from her and does not know where she lives  We discussed anxiety related to this  Overall with her medication she has been doing well  States that she has been having difficulty swallowing her pills  Lamictal was changed to the 25 mg tablet which she is doing well with  States that the gabapentin 600 mg tablet she has a hard time swallowing and seems to get "stuck"  Discussed that we could change gabapentin to 2 , 300 mg capsule that she could take to 3 times a day  States that she also occasionally has trouble swallowing food  Due to this discussed that she should consider following with gastroenterology  She has a family doctor that she just started with  Advised her to reach out to her family doctor regarding her dysphagia and consider seeing GI  She denies suicidal ideation, intent or plan at present, has no suicidal ideation, intent or plan at present      She denies any auditory hallucinations and visual hallucinations, denies any other delusional thinking, denies any delusional thinking  She denies any side effects from medications    HPI ROS Appetite Changes and Sleep: normal appetite, normal sleep    Review Of Systems:      Constitutional Negative   ENT Negative   Cardiovascular Negative   Respiratory Negative   Gastrointestinal Negative   Genitourinary Negative   Musculoskeletal  fibromyalgia pain   Integumentary Negative   Neurological Negative   Endocrine Negative   Other Symptoms Negative and None       Laboratory Results: No results found for this or any previous visit      Substance Abuse History:    Social History     Substance and Sexual Activity   Drug Use No       Family Psychiatric History:     Family History   Problem Relation Age of Onset   • No Known Problems Father    • No Known Problems Mother        The following portions of the patient's history were reviewed and updated as appropriate: past family history, past medical history, past social history, past surgical history and problem list     Social History     Socioeconomic History   • Marital status: /Civil Union     Spouse name: Not on file   • Number of children: Not on file   • Years of education: Not on file   • Highest education level: Not on file   Occupational History   • Not on file   Tobacco Use   • Smoking status: Former     Packs/day: 0 50     Types: Cigarettes   • Smokeless tobacco: Never   Vaping Use   • Vaping Use: Never used   Substance and Sexual Activity   • Alcohol use: Yes     Comment: social    • Drug use: No   • Sexual activity: Not on file   Other Topics Concern   • Not on file   Social History Narrative   • Not on file     Social Determinants of Health     Financial Resource Strain: Not on file   Food Insecurity: Not on file   Transportation Needs: Not on file   Physical Activity: Not on file   Stress: Not on file   Social Connections: Not on file   Intimate Partner Violence: Not on file   Housing Stability: Not on file     Social History     Social History Narrative   • Not on file Social History     Tobacco History     Smoking Status  Former Smoking Frequency  0 50 packs/day Smoking Tobacco Type  Cigarettes    Smokeless Tobacco Use  Never          Alcohol History     Alcohol Use Status  Yes Comment  social           Drug Use     Drug Use Status  No          Sexual Activity     Sexually Active  Not Asked          Activities of Daily Living    Not Asked                     OBJECTIVE:     Mental Status Evaluation:    Appearance age appropriate, casually dressed   Behavior pleasant, cooperative   Speech normal volume, normal pitch   Mood  euthymic   Affect  appropriate   Thought Processes logical   Associations intact associations   Thought Content normal   Perceptual Disturbances: none   Abnormal Thoughts  Risk Potential Suicidal ideation - None  Homicidal ideation - None  Potential for aggression - No   Orientation oriented to person, place, time/date and situation   Memory recent and remote memory grossly intact   Cosciousness alert and awake   Attention Span attention span and concentration are age appropriate   Intellect  not formally assessed   Insight age appropriate    Judgement good    Muscle Strength and  Gait  steady gait   Language no difficulty naming common objects   Fund of Knowledge displays adequate knowledge of current events   Pain none   Pain Scale 0       Assessment/Plan:       Diagnoses and all orders for this visit:    Fibromyalgia  -     gabapentin (NEURONTIN) 300 mg capsule; 2 po tid    Bipolar 1 disorder, mixed, moderate (HCC)  -     ARIPiprazole (ABILIFY) 10 mg tablet; Take 1 tablet (10 mg total) by mouth daily  -     lamoTRIgine (LaMICtal) 25 mg tablet;  Take 8 tablets (200 mg total) by mouth daily at bedtime    Primary insomnia  -     traZODone (DESYREL) 50 mg tablet; 2- 3 po qhs prn          Treatment Recommendations/Precautions:  Abilify 10 mg daily  Lamictal 200 mg daily  Cymbalta 60 mg daily  Gabapentin 600 mg 3 times daily, change to 300 milligrams capsule 2 capsules 3 times a day  Over-the-counter vitamin D supplementation  Trazodone 50 mg 2-3 at bedtime as needed  We will follow up in 3 months and she will call me sooner if any questions or concerns        Risks/Benefits      Risks, Benefits And Possible Side Effects Of Medications:    Risks, benefits, and possible side effects of medications explained to patient and patient verbalizes understanding and agreement for treatment      Controlled Medication Discussion:     Not applicable    Psychotherapy Provided:     Individual psychotherapy provided: No

## 2023-01-20 NOTE — BH TREATMENT PLAN
TREATMENT PLAN (Medication Management Only)        [facility]    Name and Date of Birth:  Marsha Sharpe 59 y o  1965  Date of Treatment Plan: January 20, 2023  Diagnosis/Diagnoses:    1  Fibromyalgia    2  Bipolar 1 disorder, mixed, moderate (HCC)    3  Primary insomnia      Strengths/Personal Resources for Self-Care: "spouse, embroidery"  Area/Areas of need (in own words): anxiety  1  Long Term Goal: maintain control of anxiety  Target Date:6 months - 7/20/2023  Person/Persons responsible for completion of goal: Miguelina  2  Short Term Objective (s) - How will we reach this goal?:   A  Provider new recommended medication/dosage changes and/or continue medication(s): continue current medications as prescribed  B  N/A   C  N/A  Target Date:6 months - 7/20/2023  Person/Persons Responsible for Completion of Goal: Miguelina  Progress Towards Goals: stable  Treatment Modality: medication management every 6 months  Review due 180 days from date of this plan: 6 months - 7/20/2023  Expected length of service: ongoing treatment  My Physician/PA/NP and I have developed this plan together and I agree to work on the goals and objectives  I understand the treatment goals that were developed for my treatment

## 2023-04-07 ENCOUNTER — OFFICE VISIT (OUTPATIENT)
Dept: PSYCHIATRY | Facility: CLINIC | Age: 58
End: 2023-04-07

## 2023-04-07 DIAGNOSIS — F31.62 BIPOLAR 1 DISORDER, MIXED, MODERATE (HCC): Primary | ICD-10-CM

## 2023-04-07 DIAGNOSIS — F51.01 PRIMARY INSOMNIA: ICD-10-CM

## 2023-04-07 DIAGNOSIS — F41.9 ANXIETY: ICD-10-CM

## 2023-04-07 DIAGNOSIS — M79.7 FIBROMYALGIA: ICD-10-CM

## 2023-04-07 DIAGNOSIS — Z51.81 MEDICATION MONITORING ENCOUNTER: ICD-10-CM

## 2023-04-07 RX ORDER — GABAPENTIN 400 MG/1
800 CAPSULE ORAL 3 TIMES DAILY
Qty: 540 CAPSULE | Refills: 1 | Status: SHIPPED | OUTPATIENT
Start: 2023-04-07

## 2023-04-07 NOTE — PSYCH
PROGRESS NOTE        [facility]      Name and Date of Birth:  Sven Fletcher 59 y o  1965    Date of Visit: 04/07/23    SUBJECTIVE:  Miguelina for follow up bipolar disorder, anxiety, fibromyalgia and medication management  States that she has been doing well with her moods  No significant mood swings over the past couple months  No manic episodes  Depression has been stable  States that she has anxiety at times but typically is manageable  Appetite is adequate  Sleeping fairly well with trazodone  Reports that her fibromyalgia pain has been pretty significant over the past few weeks  States that today it is a little bit better  States that when her pain is significantly increased she spends more time lying down  Reports that her relationship with her spouse has been good  She is following with rheumatology  She question other medications for fibromyalgia  Deferred her back to rheumatology to discuss any medications in addition to or in place of gabapentin or Cymbalta that I am prescribing for her  Discussed gabapentin dosage and that this could be titrated to 800 mg 3 times a day  Also discussed exercise, specifically aqua therapy and encouraged to consider joining a gym that has a pool  Her labs were reviewed and GFR was within normal limits  She is also tolerating this medication well without any adverse effects  No lethargy or sedation noted  No side effects with her other medications  No rash  She denies suicidal ideation, intent or plan at present, has no suicidal ideation, intent or plan at present  She denies any auditory hallucinations and visual hallucinations, denies any other delusional thinking, denies any delusional thinking  She denies any side effects from medications      HPI ROS Appetite Changes and Sleep: normal appetite, normal sleep    Review Of Systems:      Constitutional Negative   ENT Negative Cardiovascular Negative   Respiratory Negative   Gastrointestinal Negative   Genitourinary Negative   Musculoskeletal Negative   Integumentary Negative   Neurological Negative   Endocrine Negative   Other Symptoms Negative and None       Laboratory Results: No results found for this or any previous visit      Substance Abuse History:    Social History     Substance and Sexual Activity   Drug Use No       Family Psychiatric History:     Family History   Problem Relation Age of Onset   • No Known Problems Father    • No Known Problems Mother        The following portions of the patient's history were reviewed and updated as appropriate: past family history, past medical history, past social history, past surgical history and problem list     Social History     Socioeconomic History   • Marital status: /Civil Union     Spouse name: Not on file   • Number of children: Not on file   • Years of education: Not on file   • Highest education level: Not on file   Occupational History   • Not on file   Tobacco Use   • Smoking status: Former     Packs/day: 0 50     Types: Cigarettes   • Smokeless tobacco: Never   Vaping Use   • Vaping Use: Never used   Substance and Sexual Activity   • Alcohol use: Yes     Comment: social    • Drug use: No   • Sexual activity: Not on file   Other Topics Concern   • Not on file   Social History Narrative   • Not on file     Social Determinants of Health     Financial Resource Strain: Not on file   Food Insecurity: Not on file   Transportation Needs: Not on file   Physical Activity: Not on file   Stress: Not on file   Social Connections: Not on file   Intimate Partner Violence: Not on file   Housing Stability: Not on file     Social History     Social History Narrative   • Not on file        Social History     Tobacco History     Smoking Status  Former Smoking Frequency  0 50 packs/day Smoking Tobacco Type  Cigarettes    Smokeless Tobacco Use  Never          Alcohol History     Alcohol Use Status  Yes Comment  social           Drug Use     Drug Use Status  No          Sexual Activity     Sexually Active  Not Asked          Activities of Daily Living    Not Asked                     OBJECTIVE:     Mental Status Evaluation:    Appearance age appropriate, casually dressed   Behavior pleasant, cooperative   Speech normal volume, normal pitch   Mood  currently euthymic   Affect  congruent   Thought Processes logical   Associations intact associations   Thought Content normal   Perceptual Disturbances: none   Abnormal Thoughts  Risk Potential Suicidal ideation - None  Homicidal ideation - None  Potential for aggression - No   Orientation oriented to person, place, time/date and situation   Memory recent and remote memory grossly intact   Cosciousness alert and awake   Attention Span attention span and concentration are age appropriate   Intellect  not formally assessed   Insight age appropriate    Judgement good    Muscle Strength and  Gait  steady gait   Language no difficulty naming common objects   Fund of Knowledge displays adequate knowledge of current events   Pain  back pain   Pain Scale pain       Assessment/Plan:       Diagnoses and all orders for this visit:    Bipolar 1 disorder, mixed, moderate (HCC)  -     gabapentin (NEURONTIN) 400 mg capsule; Take 2 capsules (800 mg total) by mouth 3 (three) times a day    Fibromyalgia  -     gabapentin (NEURONTIN) 400 mg capsule;  Take 2 capsules (800 mg total) by mouth 3 (three) times a day    Primary insomnia    Anxiety    Medication monitoring encounter          Treatment Recommendations/Precautions:  Increase gabapentin to 800 mg 3 times daily  Continue other medications as follows:  Cymbalta 60 mg daily  Lamictal 200 mg daily  Abilify 10 mg po qd  Trazodone 50 mg 2-3 at bedtime as needed  We will follow up in 3 months she will call me sooner if any questions or concerns    Continue current medications:    Risks/Benefits      Risks, Benefits And Possible Side Effects Of Medications:    Risks, benefits, and possible side effects of medications explained to patient and patient verbalizes understanding and agreement for treatment      Controlled Medication Discussion:     Not applicable    Psychotherapy Provided:     Individual psychotherapy provided: No

## 2023-06-09 ENCOUNTER — OFFICE VISIT (OUTPATIENT)
Dept: FAMILY MEDICINE CLINIC | Facility: CLINIC | Age: 58
End: 2023-06-09
Payer: COMMERCIAL

## 2023-06-09 VITALS
HEIGHT: 67 IN | SYSTOLIC BLOOD PRESSURE: 110 MMHG | WEIGHT: 179 LBS | DIASTOLIC BLOOD PRESSURE: 68 MMHG | RESPIRATION RATE: 16 BRPM | BODY MASS INDEX: 28.09 KG/M2 | HEART RATE: 65 BPM | OXYGEN SATURATION: 98 % | TEMPERATURE: 97.6 F

## 2023-06-09 DIAGNOSIS — M79.7 FIBROMYALGIA: Primary | ICD-10-CM

## 2023-06-09 DIAGNOSIS — E78.49 OTHER HYPERLIPIDEMIA: ICD-10-CM

## 2023-06-09 DIAGNOSIS — F31.9 BIPOLAR 1 DISORDER (HCC): ICD-10-CM

## 2023-06-09 DIAGNOSIS — G89.29 CHRONIC BILATERAL LOW BACK PAIN WITHOUT SCIATICA: ICD-10-CM

## 2023-06-09 DIAGNOSIS — Z13.820 OSTEOPOROSIS SCREENING: ICD-10-CM

## 2023-06-09 DIAGNOSIS — F33.2 MDD (MAJOR DEPRESSIVE DISORDER), RECURRENT SEVERE, WITHOUT PSYCHOSIS (HCC): ICD-10-CM

## 2023-06-09 DIAGNOSIS — E55.9 VITAMIN D INSUFFICIENCY: ICD-10-CM

## 2023-06-09 DIAGNOSIS — Z23 IMMUNIZATION DUE: ICD-10-CM

## 2023-06-09 DIAGNOSIS — Z00.00 MEDICARE ANNUAL WELLNESS VISIT, INITIAL: ICD-10-CM

## 2023-06-09 DIAGNOSIS — Z78.0 ASYMPTOMATIC MENOPAUSE: ICD-10-CM

## 2023-06-09 DIAGNOSIS — M54.50 CHRONIC BILATERAL LOW BACK PAIN WITHOUT SCIATICA: ICD-10-CM

## 2023-06-09 DIAGNOSIS — Z12.31 ENCOUNTER FOR SCREENING MAMMOGRAM FOR MALIGNANT NEOPLASM OF BREAST: ICD-10-CM

## 2023-06-09 DIAGNOSIS — Z12.11 ENCOUNTER FOR SCREENING FOR MALIGNANT NEOPLASM OF COLON: ICD-10-CM

## 2023-06-09 PROCEDURE — 99203 OFFICE O/P NEW LOW 30 MIN: CPT | Performed by: FAMILY MEDICINE

## 2023-06-09 PROCEDURE — G0009 ADMIN PNEUMOCOCCAL VACCINE: HCPCS

## 2023-06-09 PROCEDURE — 90677 PCV20 VACCINE IM: CPT

## 2023-06-09 PROCEDURE — G0438 PPPS, INITIAL VISIT: HCPCS | Performed by: FAMILY MEDICINE

## 2023-06-09 NOTE — PROGRESS NOTES
Assessment and Plan:     Problem List Items Addressed This Visit        Other    Bipolar 1 disorder ([facility] )     Stable on her Abilify, Cymbalta, Lamictal and trazodone  Continue same  We will continue to monitor  Fibromyalgia - Primary     Fair control on duloxetine and Neurontin  Continue same medications  We will continue to monitor  Relevant Orders    Comprehensive metabolic panel    Chronic bilateral low back pain without sciatica     Fair control on gabapentin  Discussed with patient about stretching exercises  We will consider referral to physical therapy  Breast cancer screening    Relevant Orders    Mammo screening bilateral w 3d & cad    MDD (major depressive disorder), recurrent severe, without psychosis ([facility] [address] )     Stable on her medications  Continue same and continue to follow-up with psychiatrist          Other hyperlipidemia     It was discussed about low-fat diet and regular exercise  Continue to monitor fasting lipid profile    Relevant Orders    CBC and differential    Comprehensive metabolic panel    Lipid Panel with Direct LDL reflex    TSH, 3rd generation with Free T4 reflex    Hemoglobin A1C    Osteoporosis screening    Asymptomatic menopause    Relevant Orders    DXA bone density spine hip and pelvis    Vitamin D insufficiency     Continue on vitamin D  We will continue to monitor her vitamin D level  Relevant Orders    Vitamin D 25 hydroxy    Medicare annual wellness visit, initial     It was discussed about immunizations, diet, exercise and safety measures  Other Visit Diagnoses     Encounter for screening for malignant neoplasm of colon        Relevant Orders    Cologuard    Immunization due        Relevant Orders    Pneumococcal Conjugate Vaccine 20-valent (Pcv20) (Completed)        BMI Counseling: Body mass index is 28 04 kg/m²   The BMI is above normal  Nutrition recommendations include decreasing portion sizes, encouraging healthy choices of fruits and vegetables and decreasing fast food intake  Rationale for BMI follow-up plan is due to patient being overweight or obese  Preventive health issues were discussed with patient, and age appropriate screening tests were ordered as noted in patient's After Visit Summary  Personalized health advice and appropriate referrals for health education or preventive services given if needed, as noted in patient's After Visit Summary  History of Present Illness:     Patient presents for a Medicare Wellness Visit    She is here today as a new patient to establish and for follow-up multiple medical problems  She has been taking her medications  She denies any side effect from her medications  She follow-up with psychiatrist for her bipolar type I  Her symptoms are well controlled on her medications  She also has history of chronic back pain and fibromyalgia  Neuropathy seems to help with the symptoms  She tries to watch her diet but she does not exercise she has been taking vitamin D supplement  She did not have DEXA scan done yet  Patient Care Team:  Angel Amaya MD as PCP - General (Family Medicine)     Review of Systems:     Review of Systems   Constitutional: Negative for chills and fever  HENT: Negative for trouble swallowing  Eyes: Negative for visual disturbance  Respiratory: Negative for cough and shortness of breath  Cardiovascular: Negative for chest pain, palpitations and leg swelling  Gastrointestinal: Negative for abdominal pain, constipation and diarrhea  Endocrine: Negative for cold intolerance and heat intolerance  Genitourinary: Negative for difficulty urinating and dysuria  Musculoskeletal: Positive for arthralgias, back pain and myalgias  Negative for gait problem  Skin: Negative for rash  Neurological: Negative for dizziness, tremors, seizures and headaches  Hematological: Negative for adenopathy     Psychiatric/Behavioral: Negative for behavioral problems          Problem List:     Patient Active Problem List   Diagnosis   • Stress incontinence of urine   • Bipolar 1 disorder (HCC)   • Major depression, recurrent (HCC)   • Fibromyalgia   • Cellulitis of right lower extremity   • Cough with expectoration   • Chronic bilateral low back pain without sciatica   • Breast cancer screening   • Colon cancer screening   • MDD (major depressive disorder), recurrent severe, without psychosis ([facility] [address] )   • Panic disorder   • Other hyperlipidemia   • Osteoporosis screening   • Asymptomatic menopause   • Vitamin D insufficiency   • Medicare annual wellness visit, initial      Past Medical and Surgical History:     Past Medical History:   Diagnosis Date   • Anxiety    • Depression    • Ovarian cyst    • Urinary incontinence      Past Surgical History:   Procedure Laterality Date   • BACK SURGERY      fusion   • CERVICAL SPINE SURGERY     • CYSTOSCOPY  03/13/2018    Dr Mesa    • OOPHORECTOMY Right    • MI LAPAROSCOPY SLING OPERATION STRESS INCONT N/A 4/24/2018    Procedure: INSERTION PUBOVAGINAL SLING (FEMALE) Trans obturator mid urethral sling insertion, CYSTOSCOPY;  Surgeon: Adolfo Calloway MD;  Location: [facility];  Service: Urology      Family History:     Family History   Problem Relation Age of Onset   • No Known Problems Father    • No Known Problems Mother       Social History:     Social History     Socioeconomic History   • Marital status: /Civil Union     Spouse name: None   • Number of children: None   • Years of education: None   • Highest education level: None   Occupational History   • None   Tobacco Use   • Smoking status: Former     Packs/day: 0 50     Types: Cigarettes   • Smokeless tobacco: Never   Vaping Use   • Vaping Use: Never used   Substance and Sexual Activity   • Alcohol use: Yes     Comment: social    • Drug use: No   • Sexual activity: None   Other Topics Concern   • None   Social History Narrative   • None     Social Determinants of Health     Financial Resource Strain: Not on file   Food Insecurity: Not on file   Transportation Needs: Not on file   Physical Activity: Not on file   Stress: Not on file   Social Connections: Not on file   Intimate Partner Violence: Not on file   Housing Stability: Not on file      Medications and Allergies:     Current Outpatient Medications   Medication Sig Dispense Refill   • ARIPiprazole (ABILIFY) 10 mg tablet Take 1 tablet (10 mg total) by mouth daily 90 tablet 1   • cholecalciferol (VITAMIN D3) 1,000 units tablet Take 1,000 Units by mouth daily     • DULoxetine (CYMBALTA) 60 mg delayed release capsule TAKE ONE CAPSULE BY MOUTH EVERY DAY 90 capsule 1   • gabapentin (NEURONTIN) 400 mg capsule Take 2 capsules (800 mg total) by mouth 3 (three) times a day 540 capsule 1   • lamoTRIgine (LaMICtal) 25 mg tablet Take 8 tablets (200 mg total) by mouth daily at bedtime 720 tablet 1   • omeprazole (PriLOSEC) 10 mg delayed release capsule Take 10 mg by mouth daily     • traZODone (DESYREL) 50 mg tablet 2- 3 po qhs prn (Patient taking differently: 3 po qhs prn) 270 tablet 1     No current facility-administered medications for this visit  Allergies   Allergen Reactions   • Azithromycin Rash   • Erythromycin Rash      Immunizations:     Immunization History   Administered Date(s) Administered   • INFLUENZA 11/17/2014, 12/02/2015, 01/24/2018   • Pneumococcal Conjugate Vaccine 20-valent (Pcv20), Polysace 06/09/2023      Health Maintenance:         Topic Date Due   • Hepatitis C Screening  Never done   • HIV Screening  Never done   • Cervical Cancer Screening  Never done   • Breast Cancer Screening: Mammogram  Never done   • Colorectal Cancer Screening  Never done         Topic Date Due   • COVID-19 Vaccine (1) Never done   • Influenza Vaccine (Season Ended) 09/01/2023      Medicare Screening Tests and Risk Assessments:     Candelario Figueredo is here for her Subsequent Wellness visit       Health Risk Assessment: Patient rates overall health as good  Patient feels that their physical health rating is same  Patient is satisfied with their life  Eyesight was rated as same  Patient feels that their emotional and mental health rating is same  Patients states they are never, rarely angry  Patient states they are never, rarely unusually tired/fatigued  Pain experienced in the last 7 days has been none  Patient states that she has experienced no weight loss or gain in last 6 months  Depression Screening:   PHQ-9 Score: 0      Fall Risk Screening: In the past year, patient has experienced: no history of falling in past year      Urinary Incontinence Screening:   Patient has not leaked urine accidently in the last six months  Home Safety:  Patient does not have trouble with stairs inside or outside of their home  Patient has working smoke alarms and has working carbon monoxide detector  Home safety hazards include: none  Nutrition:   Current diet is Regular  Medications:   Patient is currently taking over-the-counter supplements  OTC medications include: see medication list  Patient is able to manage medications  Activities of Daily Living (ADLs)/Instrumental Activities of Daily Living (IADLs):   Walk and transfer into and out of bed and chair?: Yes  Dress and groom yourself?: Yes    Bathe or shower yourself?: Yes    Feed yourself?  Yes  Do your laundry/housekeeping?: Yes  Manage your money, pay your bills and track your expenses?: Yes  Make your own meals?: Yes    Do your own shopping?: Yes    Previous Hospitalizations:   Any hospitalizations or ED visits within the last 12 months?: No      Advance Care Planning:   Living will: No    Durable POA for healthcare: No    Advanced directive: No      Cognitive Screening:   Provider or family/friend/caregiver concerned regarding cognition?: No    PREVENTIVE SCREENINGS      Cardiovascular Screening:    General: Screening Not Indicated and History Lipid Disorder "Diabetes Screening:     General: Screening Current      Colorectal Cancer Screening:     General: Risks and Benefits Discussed    Due for: Cologuard      Breast Cancer Screening:     General: Risks and Benefits Discussed    Due for: Mammogram        Cervical Cancer Screening:    General: Risks and Benefits Discussed      Osteoporosis Screening:    General: Risks and Benefits Discussed    Due for: DXA Axial      Abdominal Aortic Aneurysm (AAA) Screening:        General: Screening Not Indicated      Lung Cancer Screening:     General: Screening Not Indicated      Hepatitis C Screening:    General: Risks and Benefits Discussed    Screening, Brief Intervention, and Referral to Treatment (SBIRT)    Screening  Typical number of drinks in a day: 0  Typical number of drinks in a week: 0  Interpretation: Low risk drinking behavior  Single Item Drug Screening:  How often have you used an illegal drug (including marijuana) or a prescription medication for non-medical reasons in the past year? never    Single Item Drug Screen Score: 0  Interpretation: Negative screen for possible drug use disorder    Brief Intervention  Alcohol & drug use screenings were reviewed  No concerns regarding substance use disorder identified  Other Counseling Topics:   Calcium and vitamin D intake and regular weightbearing exercise  No results found  Physical Exam:     /68 (BP Location: Left arm, Patient Position: Sitting, Cuff Size: Large)   Pulse 65   Temp 97 6 °F (36 4 °C) (Tympanic)   Resp 16   Ht 5' 7\" (1 702 m)   Wt 81 2 kg (179 lb)   SpO2 98%   BMI 28 04 kg/m²     Physical Exam  Vitals and nursing note reviewed  Constitutional:       General: She is not in acute distress  Appearance: Normal appearance  She is well-developed  HENT:      Head: Normocephalic and atraumatic  Eyes:      Conjunctiva/sclera: Conjunctivae normal    Cardiovascular:      Rate and Rhythm: Normal rate and regular rhythm        Heart " sounds: No murmur heard  Pulmonary:      Effort: Pulmonary effort is normal  No respiratory distress  Breath sounds: Normal breath sounds  Abdominal:      Palpations: Abdomen is soft  Tenderness: There is no abdominal tenderness  Musculoskeletal:         General: No swelling  Cervical back: Neck supple  Skin:     General: Skin is warm and dry  Capillary Refill: Capillary refill takes less than 2 seconds  Neurological:      Mental Status: She is alert     Psychiatric:         Mood and Affect: Mood normal           Aaron Llanos MD

## 2023-06-11 PROBLEM — E55.9 VITAMIN D INSUFFICIENCY: Status: ACTIVE | Noted: 2023-06-11

## 2023-06-11 PROBLEM — Z78.0 ASYMPTOMATIC MENOPAUSE: Status: ACTIVE | Noted: 2023-06-11

## 2023-06-11 PROBLEM — Z00.00 MEDICARE ANNUAL WELLNESS VISIT, INITIAL: Status: ACTIVE | Noted: 2023-06-11

## 2023-06-11 PROBLEM — Z13.820 OSTEOPOROSIS SCREENING: Status: ACTIVE | Noted: 2023-06-11

## 2023-06-11 PROBLEM — F33.2 MDD (MAJOR DEPRESSIVE DISORDER), RECURRENT SEVERE, WITHOUT PSYCHOSIS (HCC): Status: ACTIVE | Noted: 2019-03-28

## 2023-06-11 PROBLEM — F41.0 PANIC DISORDER: Status: ACTIVE | Noted: 2019-03-29

## 2023-06-11 PROBLEM — E78.49 OTHER HYPERLIPIDEMIA: Status: ACTIVE | Noted: 2023-06-11

## 2023-06-11 NOTE — ASSESSMENT & PLAN NOTE
Fair control on gabapentin  Discussed with patient about stretching exercises  We will consider referral to physical therapy

## 2023-06-11 NOTE — ASSESSMENT & PLAN NOTE
Stable on her Abilify, Cymbalta, Lamictal and trazodone  Continue same  We will continue to monitor

## 2023-06-11 NOTE — ASSESSMENT & PLAN NOTE
It was discussed about low-fat diet and regular exercise  Continue to monitor fasting lipid profile

## 2023-07-03 LAB — COLOGUARD RESULT REPORTABLE: NEGATIVE

## 2023-07-04 DIAGNOSIS — F31.62 BIPOLAR 1 DISORDER, MIXED, MODERATE (HCC): ICD-10-CM

## 2023-07-05 RX ORDER — ARIPIPRAZOLE 10 MG/1
TABLET ORAL
Qty: 90 TABLET | Refills: 1 | Status: SHIPPED | OUTPATIENT
Start: 2023-07-05 | End: 2023-07-07 | Stop reason: SDUPTHER

## 2023-07-05 RX ORDER — LAMOTRIGINE 25 MG/1
TABLET ORAL
Qty: 720 TABLET | Refills: 1 | Status: SHIPPED | OUTPATIENT
Start: 2023-07-05 | End: 2023-07-07 | Stop reason: SDUPTHER

## 2023-07-06 ENCOUNTER — TELEPHONE (OUTPATIENT)
Dept: FAMILY MEDICINE CLINIC | Facility: CLINIC | Age: 58
End: 2023-07-06

## 2023-07-06 NOTE — TELEPHONE ENCOUNTER
----- Message from Jolly Najjar, MD sent at 7/4/2023  4:42 PM EDT -----  Cologuard test came back negative.

## 2023-07-07 ENCOUNTER — TELEMEDICINE (OUTPATIENT)
Dept: PSYCHIATRY | Facility: CLINIC | Age: 58
End: 2023-07-07
Payer: COMMERCIAL

## 2023-07-07 DIAGNOSIS — F41.9 ANXIETY: ICD-10-CM

## 2023-07-07 DIAGNOSIS — M79.7 FIBROMYALGIA: ICD-10-CM

## 2023-07-07 DIAGNOSIS — F31.62 BIPOLAR 1 DISORDER, MIXED, MODERATE (HCC): Primary | ICD-10-CM

## 2023-07-07 DIAGNOSIS — F51.01 PRIMARY INSOMNIA: ICD-10-CM

## 2023-07-07 PROCEDURE — 99214 OFFICE O/P EST MOD 30 MIN: CPT | Performed by: PHYSICIAN ASSISTANT

## 2023-07-07 RX ORDER — TRAZODONE HYDROCHLORIDE 50 MG/1
TABLET ORAL
Qty: 270 TABLET | Refills: 0 | OUTPATIENT
Start: 2023-07-07

## 2023-07-07 RX ORDER — DULOXETIN HYDROCHLORIDE 60 MG/1
60 CAPSULE, DELAYED RELEASE ORAL DAILY
Qty: 90 CAPSULE | Refills: 1 | Status: SHIPPED | OUTPATIENT
Start: 2023-07-07

## 2023-07-07 RX ORDER — TRAZODONE HYDROCHLORIDE 50 MG/1
TABLET ORAL
Qty: 270 TABLET | Refills: 1 | Status: SHIPPED | OUTPATIENT
Start: 2023-07-07

## 2023-07-07 RX ORDER — ARIPIPRAZOLE 10 MG/1
10 TABLET ORAL DAILY
Qty: 90 TABLET | Refills: 1 | Status: SHIPPED | OUTPATIENT
Start: 2023-07-07

## 2023-07-07 RX ORDER — LAMOTRIGINE 25 MG/1
TABLET ORAL
Qty: 720 TABLET | Refills: 1 | Status: SHIPPED | OUTPATIENT
Start: 2023-07-07

## 2023-07-07 RX ORDER — GABAPENTIN 400 MG/1
800 CAPSULE ORAL 3 TIMES DAILY
Qty: 540 CAPSULE | Refills: 1 | Status: SHIPPED | OUTPATIENT
Start: 2023-07-07

## 2023-07-07 RX ORDER — DULOXETIN HYDROCHLORIDE 60 MG/1
CAPSULE, DELAYED RELEASE ORAL
Qty: 90 CAPSULE | Refills: 0 | OUTPATIENT
Start: 2023-07-07

## 2023-07-07 NOTE — PSYCH
Virtual Regular Visit    Verification of patient location:    Patient is located at Home in the following state in which I hold an active license [place]      Assessment/Plan:    Problem List Items Addressed This Visit        Other    Fibromyalgia    Relevant Medications    gabapentin (NEURONTIN) 400 mg capsule    DULoxetine (CYMBALTA) 60 mg delayed release capsule   Other Visit Diagnoses     Bipolar 1 disorder, mixed, moderate (HCC)    -  Primary    Relevant Medications    traZODone (DESYREL) 50 mg tablet    gabapentin (NEURONTIN) 400 mg capsule    ARIPiprazole (ABILIFY) 10 mg tablet    lamoTRIgine (LaMICtal) 25 mg tablet    DULoxetine (CYMBALTA) 60 mg delayed release capsule    Anxiety        Relevant Medications    DULoxetine (CYMBALTA) 60 mg delayed release capsule    Primary insomnia        Relevant Medications    traZODone (DESYREL) 50 mg tablet          Goals addressed in session: Goal 1          Reason for visit is   Chief Complaint   Patient presents with   • Virtual Regular Visit        Encounter provider Anahi Georges PA-C    Provider located at [address] [place] [id number]      Recent Visits  No visits were found meeting these conditions. Showing recent visits within past 7 days and meeting all other requirements  Today's Visits  Date Type Provider Dept   07/07/23 [address], PA-C [address] [place] [address] today's visits and meeting all other requirements  Future Appointments  No visits were found meeting these conditions. Showing future appointments within next 150 days and meeting all other requirements       The patient was identified by name and date of birth. Serge Witt was informed that this is a telemedicine visit and [facility] She agrees to proceed. .  My office door was closed.  No one else was in the room.  She acknowledged consent and understanding of privacy and security of the video platform. The patient has agreed to participate and understands they can discontinue the visit at any time. Patient is aware this is a billable service. Anatoliy Carrizales is a 59 y.o. female with history of bipolar disorder. STEWART Hughes is seen for follow-up of bipolar disorder and for medication management. Overall states that she has been doing well with her moods over the past few months. No significant depressive episodes. No anxiety attacks. Sleeping about 6 hours at night with trazodone which is an improvement. Appetite is adequate. Following with her family doctor regular basis. Also following with rheumatology and states that she was told that there was no other medication options for her. Continues to have fibromyalgia pain which is prohibitive for her day-to-day activities. States that she has been spending more time on the couch largely due to pain. Denies any significant change in mood and feels as though her medications have been quite helpful. Also notes that gabapentin has been helpful for her mood and slightly helpful for her pain. On a positive note she and her spouse are going to visit their daughter in [place] and she is looking forward to that. Continues to enjoy [email or web address].     Past Medical History:   Diagnosis Date   • Anxiety    • Depression    • Ovarian cyst    • Urinary incontinence        Past Surgical History:   Procedure Laterality Date   • BACK SURGERY      fusion   • CERVICAL SPINE SURGERY     • CYSTOSCOPY  03/13/2018        • OOPHORECTOMY Right    • MA LAPAROSCOPY SLING OPERATION STRESS INCONT N/A 4/24/2018    Procedure: INSERTION PUBOVAGINAL SLING (FEMALE) Trans obturator mid urethral sling insertion, CYSTOSCOPY;  Surgeon: Serjio Velasco MD;  [facility];  Service: Urology       Current Outpatient Medications   Medication Sig Dispense Refill • ARIPiprazole (ABILIFY) 10 mg tablet Take 1 tablet (10 mg total) by mouth daily 90 tablet 1   • DULoxetine (CYMBALTA) 60 mg delayed release capsule Take 1 capsule (60 mg total) by mouth daily 90 capsule 1   • gabapentin (NEURONTIN) 400 mg capsule Take 2 capsules (800 mg total) by mouth 3 (three) times a day 540 capsule 1   • lamoTRIgine (LaMICtal) 25 mg tablet 8 tabs po qhs 720 tablet 1   • traZODone (DESYREL) 50 mg tablet 3 po qhs prn 270 tablet 1   • cholecalciferol (VITAMIN D3) 1,000 units tablet Take 1,000 Units by mouth daily     • omeprazole (PriLOSEC) 10 mg delayed release capsule Take 10 mg by mouth daily       No current facility-administered medications for this visit. Allergies   Allergen Reactions   • Azithromycin Rash   • Erythromycin Rash       Review of Systems  As noted in HPI  Video Exam    There were no vitals filed for this visit.     Physical Exam   Mental status exam:  Speech is clear, coherent, normal rate and volume    adequate hygiene, good eye contact  Affect is appropriate, mood is currently euthymic  Linear and coherent thought process  No suicidal or homicidal ideation  No psychotic signs or symptoms noted, no hallucinations and no delusions were voiced  Cognition appears intact  Insight and judgment appears intact    Medications and treatment plan as follows:   Gabapentin 800 mg 3 times daily  Cymbalta 60 mg daily  Lamictal 200 mg daily  Abilify 10 mg daily  Trazodone 50 mg 2-3 at bedtime as needed basis  Discussed exercise/Aqua therapy for residual fibromyalgia pain which she will look into  We will follow up in 3 months she will call me sooner if any questions or concerns    Visit Time    Visit Start Time: 1056  Visit Stop Time: 1115  Total Visit Duration: 19 minutes

## 2023-07-14 ENCOUNTER — APPOINTMENT (OUTPATIENT)
Dept: LAB | Age: 58
End: 2023-07-14
Payer: COMMERCIAL

## 2023-07-14 DIAGNOSIS — Z51.81 MEDICATION MONITORING ENCOUNTER: ICD-10-CM

## 2023-07-14 DIAGNOSIS — E55.9 VITAMIN D DEFICIENCY: ICD-10-CM

## 2023-07-14 DIAGNOSIS — E55.9 VITAMIN D INSUFFICIENCY: ICD-10-CM

## 2023-07-14 DIAGNOSIS — E66.3 OVERWEIGHT: ICD-10-CM

## 2023-07-14 DIAGNOSIS — Z79.899 OTHER LONG TERM (CURRENT) DRUG THERAPY: ICD-10-CM

## 2023-07-14 DIAGNOSIS — F31.62 BIPOLAR 1 DISORDER, MIXED, MODERATE (HCC): ICD-10-CM

## 2023-07-14 DIAGNOSIS — M79.7 FIBROMYALGIA: ICD-10-CM

## 2023-07-14 DIAGNOSIS — E78.49 OTHER HYPERLIPIDEMIA: ICD-10-CM

## 2023-07-14 LAB
25(OH)D3 SERPL-MCNC: 60.3 NG/ML (ref 30–100)
ALBUMIN SERPL BCP-MCNC: 4.4 G/DL (ref 3.5–5)
ALP SERPL-CCNC: 71 U/L (ref 46–116)
ALT SERPL W P-5'-P-CCNC: 23 U/L (ref 12–78)
ANION GAP SERPL CALCULATED.3IONS-SCNC: 0 MMOL/L
AST SERPL W P-5'-P-CCNC: 12 U/L (ref 5–45)
BASOPHILS # BLD AUTO: 0.06 THOUSANDS/ÂΜL (ref 0–0.1)
BASOPHILS NFR BLD AUTO: 1 % (ref 0–1)
BILIRUB SERPL-MCNC: 0.54 MG/DL (ref 0.2–1)
BUN SERPL-MCNC: 10 MG/DL (ref 5–25)
CALCIUM SERPL-MCNC: 9.7 MG/DL (ref 8.3–10.1)
CHLORIDE SERPL-SCNC: 110 MMOL/L (ref 96–108)
CHOLEST SERPL-MCNC: 137 MG/DL
CO2 SERPL-SCNC: 29 MMOL/L (ref 21–32)
CREAT SERPL-MCNC: 1.12 MG/DL (ref 0.6–1.3)
EOSINOPHIL # BLD AUTO: 0.22 THOUSAND/ÂΜL (ref 0–0.61)
EOSINOPHIL NFR BLD AUTO: 3 % (ref 0–6)
ERYTHROCYTE [DISTWIDTH] IN BLOOD BY AUTOMATED COUNT: 13.8 % (ref 11.6–15.1)
EST. AVERAGE GLUCOSE BLD GHB EST-MCNC: 97 MG/DL
GFR SERPL CREATININE-BSD FRML MDRD: 53 ML/MIN/1.73SQ M
GLUCOSE P FAST SERPL-MCNC: 108 MG/DL (ref 65–99)
HBA1C MFR BLD: 5 %
HCT VFR BLD AUTO: 48.4 % (ref 34.8–46.1)
HDLC SERPL-MCNC: 76 MG/DL
HGB BLD-MCNC: 15.8 G/DL (ref 11.5–15.4)
IMM GRANULOCYTES # BLD AUTO: 0.01 THOUSAND/UL (ref 0–0.2)
IMM GRANULOCYTES NFR BLD AUTO: 0 % (ref 0–2)
LDLC SERPL CALC-MCNC: 45 MG/DL (ref 0–100)
LYMPHOCYTES # BLD AUTO: 1.55 THOUSANDS/ÂΜL (ref 0.6–4.47)
LYMPHOCYTES NFR BLD AUTO: 23 % (ref 14–44)
MCH RBC QN AUTO: 32 PG (ref 26.8–34.3)
MCHC RBC AUTO-ENTMCNC: 32.6 G/DL (ref 31.4–37.4)
MCV RBC AUTO: 98 FL (ref 82–98)
MONOCYTES # BLD AUTO: 0.55 THOUSAND/ÂΜL (ref 0.17–1.22)
MONOCYTES NFR BLD AUTO: 8 % (ref 4–12)
NEUTROPHILS # BLD AUTO: 4.34 THOUSANDS/ÂΜL (ref 1.85–7.62)
NEUTS SEG NFR BLD AUTO: 65 % (ref 43–75)
NRBC BLD AUTO-RTO: 0 /100 WBCS
PLATELET # BLD AUTO: 185 THOUSANDS/UL (ref 149–390)
PMV BLD AUTO: 12.1 FL (ref 8.9–12.7)
POTASSIUM SERPL-SCNC: 4 MMOL/L (ref 3.5–5.3)
PROT SERPL-MCNC: 7.3 G/DL (ref 6.4–8.4)
RBC # BLD AUTO: 4.94 MILLION/UL (ref 3.81–5.12)
SODIUM SERPL-SCNC: 139 MMOL/L (ref 135–147)
TRIGL SERPL-MCNC: 82 MG/DL
TSH SERPL DL<=0.05 MIU/L-ACNC: 2.21 UIU/ML (ref 0.45–4.5)
WBC # BLD AUTO: 6.73 THOUSAND/UL (ref 4.31–10.16)

## 2023-07-14 PROCEDURE — 36415 COLL VENOUS BLD VENIPUNCTURE: CPT

## 2023-07-14 PROCEDURE — 85025 COMPLETE CBC W/AUTO DIFF WBC: CPT

## 2023-07-14 PROCEDURE — 82306 VITAMIN D 25 HYDROXY: CPT

## 2023-07-14 PROCEDURE — 80053 COMPREHEN METABOLIC PANEL: CPT

## 2023-07-14 PROCEDURE — 84443 ASSAY THYROID STIM HORMONE: CPT

## 2023-07-14 PROCEDURE — 80061 LIPID PANEL: CPT

## 2023-07-14 PROCEDURE — 83036 HEMOGLOBIN GLYCOSYLATED A1C: CPT

## 2023-07-19 ENCOUNTER — TELEPHONE (OUTPATIENT)
Dept: FAMILY MEDICINE CLINIC | Facility: CLINIC | Age: 58
End: 2023-07-19

## 2023-07-19 NOTE — TELEPHONE ENCOUNTER
I did call pt and she was concerned about her glucose which she was fasting for being at 108. I explained to her that the HgA1C was normal. She is concerned about abnormal levels in the cbc and cmp.   Please advise

## 2023-07-21 NOTE — TELEPHONE ENCOUNTER
Had a blood work came back showing elevated fasting glucose but her A1c is normal.  Her GFR was slightly decreased. I recommend increase oral hydration and I will repeat her blood work for her CMP in 1 month fasting.   All the rest of the blood work came back normal.

## 2023-08-10 PROBLEM — Z13.820 OSTEOPOROSIS SCREENING: Status: RESOLVED | Noted: 2023-06-11 | Resolved: 2023-08-10

## 2023-08-10 PROBLEM — Z00.00 MEDICARE ANNUAL WELLNESS VISIT, INITIAL: Status: RESOLVED | Noted: 2023-06-11 | Resolved: 2023-08-10

## 2023-09-29 ENCOUNTER — HOSPITAL ENCOUNTER (OUTPATIENT)
Dept: BONE DENSITY | Facility: CLINIC | Age: 58
End: 2023-09-29
Payer: COMMERCIAL

## 2023-09-29 ENCOUNTER — HOSPITAL ENCOUNTER (OUTPATIENT)
Dept: MAMMOGRAPHY | Facility: CLINIC | Age: 58
End: 2023-09-29
Payer: COMMERCIAL

## 2023-09-29 VITALS — WEIGHT: 179 LBS | HEIGHT: 67 IN | BODY MASS INDEX: 28.09 KG/M2

## 2023-09-29 DIAGNOSIS — Z78.0 ASYMPTOMATIC MENOPAUSE: ICD-10-CM

## 2023-09-29 DIAGNOSIS — Z13.820 SCREENING FOR OSTEOPOROSIS: ICD-10-CM

## 2023-09-29 DIAGNOSIS — Z12.31 ENCOUNTER FOR SCREENING MAMMOGRAM FOR MALIGNANT NEOPLASM OF BREAST: ICD-10-CM

## 2023-09-29 PROCEDURE — 77080 DXA BONE DENSITY AXIAL: CPT

## 2023-09-29 PROCEDURE — 77067 SCR MAMMO BI INCL CAD: CPT

## 2023-09-29 PROCEDURE — 77063 BREAST TOMOSYNTHESIS BI: CPT

## 2023-10-06 ENCOUNTER — TELEMEDICINE (OUTPATIENT)
Dept: PSYCHIATRY | Facility: CLINIC | Age: 58
End: 2023-10-06
Payer: MEDICARE

## 2023-10-06 DIAGNOSIS — F41.9 ANXIETY: ICD-10-CM

## 2023-10-06 DIAGNOSIS — F31.62 BIPOLAR 1 DISORDER, MIXED, MODERATE (HCC): Primary | ICD-10-CM

## 2023-10-06 DIAGNOSIS — M79.7 FIBROMYALGIA: ICD-10-CM

## 2023-10-06 DIAGNOSIS — F51.01 PRIMARY INSOMNIA: ICD-10-CM

## 2023-10-06 PROCEDURE — 99214 OFFICE O/P EST MOD 30 MIN: CPT | Performed by: PHYSICIAN ASSISTANT

## 2023-10-06 RX ORDER — GABAPENTIN 400 MG/1
800 CAPSULE ORAL 3 TIMES DAILY
Qty: 540 CAPSULE | Refills: 0 | Status: SHIPPED | OUTPATIENT
Start: 2023-10-06

## 2023-10-06 NOTE — BH TREATMENT PLAN
TREATMENT PLAN (Medication Management Only)        [address] [facility]    Name and Date of Birth:  Tyler Manrique 59 y.o. 1965  Date of Treatment Plan: October 6, 2023  Diagnosis/Diagnoses:    1. Bipolar 1 disorder, mixed, moderate ([address])    2. Anxiety    3. Fibromyalgia    4. Primary insomnia      Strengths/Personal Resources for Self-Care: supportive family, taking medications as prescribed. Area/Areas of need (in own words): Currently stable moods  1. Long Term Goal: maintain mood stability. Target Date:3 months -   Person/Persons responsible for completion of goal: Miguelina  2. Short Term Objective (s) - How will we reach this goal?:   A. Provider new recommended medication/dosage changes and/or continue medication(s): continue current medications as prescribed. B. N/A.  C. N/A. Target Date:3 months - 1/6/2024  Person/Persons Responsible for Completion of Goal: Miguelina  Progress Towards Goals: stable  Treatment Modality: medication management every 3 months  Review due 180 days from date of this plan: 6 months - 4/6/2024  Expected length of service: maintenance  My Physician/PA/NP and I have developed this plan together and I agree to work on the goals and objectives. I understand the treatment goals that were developed for my treatment.

## 2023-10-06 NOTE — PSYCH
Virtual Regular Visit    Verification of patient location:    Patient is located at Home in the following state in which I hold an active license [place]      Assessment/Plan:    Problem List Items Addressed This Visit        Other    Fibromyalgia    Relevant Medications    gabapentin (NEURONTIN) 400 mg capsule   Other Visit Diagnoses     Bipolar 1 disorder, mixed, moderate ([address])    -  Primary    Relevant Medications    gabapentin (NEURONTIN) 400 mg capsule    Anxiety        Primary insomnia              Goals addressed in session: Goal 1          Reason for visit is   Chief Complaint   Patient presents with   • Follow-up   • Medication Management   • Virtual Regular Visit        Encounter provider Santino Maki PA-C    Provider located at [address] [place] [phone]      Recent Visits  No visits were found meeting these conditions. Showing recent visits within past 7 days and meeting all other requirements  Today's Visits  Date Type Provider Dept   [facility]06/23 [facility] today's visits and meeting all other requirements  Future Appointments  No visits were found meeting these conditions. Showing future appointments within next 150 days and meeting all other requirements       The patient was identified by name and date of birth. Devan Sampson was informed that this is a telemedicine visit and [facility]. She agrees to proceed. .  My office door was closed. No one else was in the room. She acknowledged consent and understanding of privacy and security of the video platform. The patient has agreed to participate and understands they can discontinue the visit at any time. Patient is aware this is a billable service.      Subjective  Devan Sampson is a 59 y.o. female with history of bipolar disorder. STEWART Mcintyre was seen for follow-up and for medication management. Overall states that she has been doing fairly well over the past few months. She went to visit her daughter in [place] over the summer and enjoyed her time there. Has adequate interest and motivation. Enjoys getting outdoors gardening. Also enjoys embroidery. Good relationship with her spouse. Moods have been stable. No significant depressive signs or symptoms or manic episodes. Sleeping 6 to 7-1/2 hours at night with trazodone. Taking medications as prescribed. No adverse effects noted. Following with rheumatology for fibromyalgia and also taking gabapentin which has been somewhat helpful. Medication list was reviewed and updated.       Past Medical History:   Diagnosis Date   • Anxiety    • Depression    • Ovarian cyst    • Urinary incontinence        Past Surgical History:   Procedure Laterality Date   • BACK SURGERY      fusion   • CERVICAL SPINE SURGERY     • CYSTOSCOPY  03/13/2018        • OOPHORECTOMY Right    • DC LAPAROSCOPY SLING OPERATION STRESS INCONT N/A 4/24/2018    Procedure: INSERTION PUBOVAGINAL SLING (FEMALE) Trans obturator mid urethral sling insertion, CYSTOSCOPY;  Surgeon: Alicia Busch MD;  [facility];  Service: Urology       Current Outpatient Medications   Medication Sig Dispense Refill   • gabapentin (NEURONTIN) 400 mg capsule Take 2 capsules (800 mg total) by mouth 3 (three) times a day 540 capsule 0   • ARIPiprazole (ABILIFY) 10 mg tablet Take 1 tablet (10 mg total) by mouth daily 90 tablet 1   • cholecalciferol (VITAMIN D3) 1,000 units tablet Take 1,000 Units by mouth daily     • DULoxetine (CYMBALTA) 60 mg delayed release capsule Take 1 capsule (60 mg total) by mouth daily 90 capsule 1   • lamoTRIgine (LaMICtal) 25 mg tablet 8 tabs po qhs 720 tablet 1   • omeprazole (PriLOSEC) 10 mg delayed release capsule Take 10 mg by mouth daily     • traZODone (DESYREL) 50 mg tablet 3 po qhs prn 270 tablet 1     No current facility-administered medications for this visit. Allergies   Allergen Reactions   • Azithromycin Rash   • Erythromycin Rash       Review of Systems  As noted in HPI  Video Exam    There were no vitals filed for this visit.     Physical Exam   Mental status exam:  Speech clear, coherent, normal rate and volume  Good eye contact, good hygiene  Affect is currently appropriate, mood is euthymic  Linear and coherent thought process  No suicidal or homicidal ideation  No psychotic signs or symptoms noted, no hallucinations and no delusions were voiced   cognition appears intact  Insight and judgment is intact      Medications and treatment plan as follows  Gabapentin 800 mg 3 times daily  Cymbalta 60 mg daily  Lamictal 200 mg daily  Abilify 10 mg daily  Trazodone 50 mg 2-3 at bedtime as needed  We will follow up 3 months she will call me sooner if any questions or concerns  She will follow-up on Friday, January 5 at 9 AM for virtual appointment    Visit Time    Visit Start Time: 0930  Visit Stop Time: 0951  Total Visit Duration: 21 minutes with the patient and additional time reviewing chart, charting, medications

## 2023-11-02 ENCOUNTER — TELEPHONE (OUTPATIENT)
Dept: FAMILY MEDICINE CLINIC | Facility: CLINIC | Age: 58
End: 2023-11-02

## 2023-11-02 NOTE — TELEPHONE ENCOUNTER
----- Message from [address] sent at 11/2/2023 10:50 AM EDT -----  Dexa scan showed low bone mass. Recommend daily intake of vitamin D 1000 units/day and calcium 1200 mg/day.

## 2023-12-14 ENCOUNTER — RA CDI HCC (OUTPATIENT)
Dept: OTHER | Facility: HOSPITAL | Age: 58
End: 2023-12-14

## 2023-12-22 ENCOUNTER — OFFICE VISIT (OUTPATIENT)
Dept: FAMILY MEDICINE CLINIC | Facility: CLINIC | Age: 58
End: 2023-12-22
Payer: COMMERCIAL

## 2023-12-22 VITALS
RESPIRATION RATE: 16 BRPM | HEART RATE: 77 BPM | HEIGHT: 67 IN | TEMPERATURE: 97.5 F | BODY MASS INDEX: 27.31 KG/M2 | WEIGHT: 174 LBS | DIASTOLIC BLOOD PRESSURE: 64 MMHG | OXYGEN SATURATION: 98 % | SYSTOLIC BLOOD PRESSURE: 98 MMHG

## 2023-12-22 DIAGNOSIS — R73.9 HYPERGLYCEMIA: ICD-10-CM

## 2023-12-22 DIAGNOSIS — E78.49 OTHER HYPERLIPIDEMIA: Primary | ICD-10-CM

## 2023-12-22 DIAGNOSIS — E55.9 VITAMIN D INSUFFICIENCY: ICD-10-CM

## 2023-12-22 DIAGNOSIS — N18.31 STAGE 3A CHRONIC KIDNEY DISEASE (HCC): ICD-10-CM

## 2023-12-22 DIAGNOSIS — F33.2 MDD (MAJOR DEPRESSIVE DISORDER), RECURRENT SEVERE, WITHOUT PSYCHOSIS (HCC): ICD-10-CM

## 2023-12-22 PROCEDURE — 99214 OFFICE O/P EST MOD 30 MIN: CPT | Performed by: FAMILY MEDICINE

## 2023-12-30 PROBLEM — R73.9 HYPERGLYCEMIA: Status: ACTIVE | Noted: 2023-12-30

## 2023-12-30 NOTE — PROGRESS NOTES
Name: Miguelina Russo      : 1965      MRN: [id number]  Encounter Provider: Mike Leblanc MD  Encounter Date: 2023   [facility]    Assessment & Plan     1. Other hyperlipidemia  Assessment & Plan:  Not well-controlled.  It was discussed about low-fat diet and moderate exercise.  Continue to monitor fasting lipid profile.    Orders:  -     CBC and differential; Future  -     Comprehensive metabolic panel; Future  -     Lipid Panel with Direct LDL reflex; Future  -     TSH, 3rd generation with Free T4 reflex; Future    2. Vitamin D insufficiency  Assessment & Plan:  Continue on vitamin D.  I will repeat her vitamin D level.  We will continue to monitor.    Orders:  -     Vitamin D 25 hydroxy; Future    3. Hyperglycemia  Assessment & Plan:  Elevated A1c.  It was discussed about low-carb diet and regular exercise.  Continue to monitor fasting glucose and A1c.    Orders:  -     Hemoglobin A1C; Future    4. Stage 3a chronic kidney disease (HCC)  Assessment & Plan:  Lab Results   Component Value Date    EGFR 53 2023    EGFR 67 2018    CREATININE 1.12 2023    CREATININE 0.96 2018   GFR came back low.  Increase oral hydration and repeat GFR.  I will consider referral to nephrologist if not improving.      5. MDD (major depressive disorder), recurrent severe, without psychosis (HCC)  Assessment & Plan:  Stable.  Continue same and continue to follow-up with psychiatrist.      6. BMI 27.0-27.9,adult           Subjective     She is here today for follow-up multiple medical problems.  She has been taking her medications.  She denies any side effects on her medications. She continues to follow-up with her psychiatrist and she is doing well on her medications.  Her last blood work showed decreased GFR, elevated cholesterol and fasting glucose.  She is due for blood work.      Review of Systems   Constitutional:  Negative for chills and  fever.   HENT:  Negative for trouble swallowing.    Eyes:  Negative for visual disturbance.   Respiratory:  Negative for cough and shortness of breath.    Cardiovascular:  Negative for chest pain, palpitations and leg swelling.   Gastrointestinal:  Negative for abdominal pain, constipation and diarrhea.   Endocrine: Negative for cold intolerance and heat intolerance.   Genitourinary:  Negative for difficulty urinating and dysuria.   Musculoskeletal:  Negative for gait problem.   Skin:  Negative for rash.   Neurological:  Negative for dizziness, tremors, seizures and headaches.   Hematological:  Negative for adenopathy.   Psychiatric/Behavioral:  Negative for behavioral problems.        Past Medical History:   Diagnosis Date   • Anxiety    • Depression    • Ovarian cyst    • Urinary incontinence      Past Surgical History:   Procedure Laterality Date   • BACK SURGERY      fusion   • CERVICAL SPINE SURGERY     • CYSTOSCOPY  03/13/2018        • OOPHORECTOMY Right    • IN LAPAROSCOPY SLING OPERATION STRESS INCONT N/A 4/24/2018    Procedure: INSERTION PUBOVAGINAL SLING (FEMALE) Trans obturator mid urethral sling insertion, CYSTOSCOPY;  Surgeon: James Mesa MD;  Location:  MAIN OR;  Service: Urology     Family History   Problem Relation Age of Onset   • No Known Problems Mother    • No Known Problems Father    • No Known Problems Sister    • No Known Problems Sister    • No Known Problems Daughter    • No Known Problems Daughter    • No Known Problems Daughter    • No Known Problems Maternal Grandmother    • No Known Problems Maternal Grandfather    • No Known Problems Paternal Grandmother    • No Known Problems Paternal Grandfather    • No Known Problems Paternal Aunt    • Breast cancer Neg Hx      Social History     Socioeconomic History   • Marital status: /Civil Union     Spouse name: None   • Number of children: None   • Years of education: None   • Highest education level: None   Occupational  "History   • None   Tobacco Use   • Smoking status: Former     Current packs/day: 0.50     Types: Cigarettes   • Smokeless tobacco: Never   Vaping Use   • Vaping status: Never Used   Substance and Sexual Activity   • Alcohol use: Yes     Comment: social    • Drug use: No   • Sexual activity: None   Other Topics Concern   • None   Social History Narrative   • None     Social Determinants of Health     Financial Resource Strain: Not on file   Food Insecurity: Not on file   Transportation Needs: Not on file   Physical Activity: Not on file   Stress: Not on file   Social Connections: Not on file   Intimate Partner Violence: Not on file   Housing Stability: Not on file     Current Outpatient Medications on File Prior to Visit   Medication Sig   • ARIPiprazole (ABILIFY) 10 mg tablet Take 1 tablet (10 mg total) by mouth daily   • cholecalciferol (VITAMIN D3) 1,000 units tablet Take 1,000 Units by mouth daily   • DULoxetine (CYMBALTA) 60 mg delayed release capsule Take 1 capsule (60 mg total) by mouth daily   • gabapentin (NEURONTIN) 400 mg capsule Take 2 capsules (800 mg total) by mouth 3 (three) times a day   • lamoTRIgine (LaMICtal) 25 mg tablet 8 tabs po qhs   • omeprazole (PriLOSEC) 10 mg delayed release capsule Take 10 mg by mouth daily   • traZODone (DESYREL) 50 mg tablet 3 po qhs prn     Allergies   Allergen Reactions   • Azithromycin Rash   • Erythromycin Rash     Immunization History   Administered Date(s) Administered   • INFLUENZA 11/17/2014, 12/02/2015, 01/24/2018   • Pneumococcal Conjugate Vaccine 20-valent (Pcv20), Polysace 06/09/2023       Objective     BP 98/64 (BP Location: Left arm, Patient Position: Sitting, Cuff Size: Adult)   Pulse 77   Temp 97.5 °F (36.4 °C) (Tympanic)   Resp 16   Ht 5' 7\" (1.702 m)   Wt 78.9 kg (174 lb)   SpO2 98%   BMI 27.25 kg/m²     Physical Exam  Vitals and nursing note reviewed.   Constitutional:       Appearance: She is well-developed.   HENT:      Head: Normocephalic and " atraumatic.   Eyes:      Pupils: Pupils are equal, round, and reactive to light.   Cardiovascular:      Rate and Rhythm: Normal rate and regular rhythm.      Heart sounds: Normal heart sounds.   Pulmonary:      Effort: Pulmonary effort is normal.      Breath sounds: Normal breath sounds.   Abdominal:      General: Bowel sounds are normal.      Palpations: Abdomen is soft.   Musculoskeletal:      Cervical back: Normal range of motion and neck supple.   Lymphadenopathy:      Cervical: No cervical adenopathy.   Skin:     General: Skin is warm.   Neurological:      Mental Status: She is alert and oriented to person, place, and time.       Mike Leblanc MD  BMI Counseling: Body mass index is 27.25 kg/m². The BMI is above normal. Nutrition recommendations include reducing portion sizes, decreasing overall calorie intake, and 3-5 servings of fruits/vegetables daily. Exercise recommendations include moderate aerobic physical activity for 150 minutes/week.

## 2023-12-31 NOTE — ASSESSMENT & PLAN NOTE
Elevated A1c.  It was discussed about low-carb diet and regular exercise.  Continue to monitor fasting glucose and A1c.

## 2023-12-31 NOTE — ASSESSMENT & PLAN NOTE
Not well-controlled.  It was discussed about low-fat diet and moderate exercise.  Continue to monitor fasting lipid profile.

## 2023-12-31 NOTE — ASSESSMENT & PLAN NOTE
Lab Results   Component Value Date    EGFR 53 07/14/2023    EGFR 67 04/17/2018    CREATININE 1.12 07/14/2023    CREATININE 0.96 04/17/2018   GFR came back low.  Increase oral hydration and repeat GFR.  I will consider referral to nephrologist if not improving.

## 2024-01-03 DIAGNOSIS — F31.62 BIPOLAR 1 DISORDER, MIXED, MODERATE (HCC): ICD-10-CM

## 2024-01-03 DIAGNOSIS — F51.01 PRIMARY INSOMNIA: ICD-10-CM

## 2024-01-03 RX ORDER — TRAZODONE HYDROCHLORIDE 50 MG/1
TABLET ORAL
Qty: 270 TABLET | Refills: 1 | Status: SHIPPED | OUTPATIENT
Start: 2024-01-03

## 2024-01-03 RX ORDER — ARIPIPRAZOLE 10 MG/1
10 TABLET ORAL DAILY
Qty: 90 TABLET | Refills: 1 | Status: SHIPPED | OUTPATIENT
Start: 2024-01-03 | End: 2024-01-05 | Stop reason: SDUPTHER

## 2024-01-03 RX ORDER — LAMOTRIGINE 25 MG/1
TABLET ORAL
Qty: 720 TABLET | Refills: 1 | Status: SHIPPED | OUTPATIENT
Start: 2024-01-03

## 2024-01-05 ENCOUNTER — TELEMEDICINE (OUTPATIENT)
Dept: PSYCHIATRY | Facility: CLINIC | Age: 59
End: 2024-01-05
Payer: MEDICARE

## 2024-01-05 DIAGNOSIS — F31.62 BIPOLAR 1 DISORDER, MIXED, MODERATE (HCC): Primary | ICD-10-CM

## 2024-01-05 DIAGNOSIS — F41.9 ANXIETY: ICD-10-CM

## 2024-01-05 DIAGNOSIS — M79.7 FIBROMYALGIA: ICD-10-CM

## 2024-01-05 DIAGNOSIS — F51.01 PRIMARY INSOMNIA: ICD-10-CM

## 2024-01-05 PROCEDURE — 99214 OFFICE O/P EST MOD 30 MIN: CPT | Performed by: PHYSICIAN ASSISTANT

## 2024-01-05 RX ORDER — GABAPENTIN 400 MG/1
800 CAPSULE ORAL 3 TIMES DAILY
Qty: 540 CAPSULE | Refills: 1 | Status: SHIPPED | OUTPATIENT
Start: 2024-01-05

## 2024-01-05 RX ORDER — ARIPIPRAZOLE 15 MG/1
15 TABLET ORAL DAILY
Qty: 90 TABLET | Refills: 1 | Status: SHIPPED | OUTPATIENT
Start: 2024-01-05

## 2024-01-05 RX ORDER — DULOXETIN HYDROCHLORIDE 60 MG/1
60 CAPSULE, DELAYED RELEASE ORAL DAILY
Qty: 90 CAPSULE | Refills: 1 | Status: SHIPPED | OUTPATIENT
Start: 2024-01-05

## 2024-01-05 NOTE — PSYCH
This note was not shared with the patient due to reasonable likelihood of causing patient harm    Virtual Regular Visit    Verification of patient location:    Patient is located at Home in the following state in which I hold an active license [place]      Assessment/Plan:    Problem List Items Addressed This Visit          Other    Fibromyalgia    Relevant Medications    gabapentin (NEURONTIN) 400 mg capsule    DULoxetine (CYMBALTA) 60 mg delayed release capsule     Other Visit Diagnoses       Bipolar 1 disorder, mixed, moderate (HCC)    -  Primary    Relevant Medications    ARIPiprazole (ABILIFY) 15 mg tablet    gabapentin (NEURONTIN) 400 mg capsule    DULoxetine (CYMBALTA) 60 mg delayed release capsule    Anxiety        Relevant Medications    DULoxetine (CYMBALTA) 60 mg delayed release capsule    Primary insomnia                Goals addressed in session: Goal 1          Reason for visit is   Chief Complaint   Patient presents with    Follow-up    Medication Management    Virtual Regular Visit          Encounter provider Clementine Campuzano PA-C    Provider located at [address] [address]      Recent Visits  No visits were found meeting these conditions.  Showing recent visits within past 7 days and meeting all other requirements  Today's Visits  Date Type Provider Dept   01/05/24 Telemedicine Clementine Campuzano PA-C  Psychiatric [facility]   Showing today's visits and meeting all other requirements  Future Appointments  No visits were found meeting these conditions.  Showing future appointments within next 150 days and meeting all other requirements       The patient was identified by name and date of birth. Miguelina Russo was informed that this is a telemedicine visit and that the visit is being conducted throughthe Epic Embedded platform. She agrees to proceed..  My office door was closed. No one else was in  "the room.  She acknowledged consent and understanding of privacy and security of the video platform. The patient has agreed to participate and understands they can discontinue the visit at any time.    Patient is aware this is a billable service.     Subjective  Miguelina Russo is a 58 y.o. female with history of bipolar disorder and anxiety.      HPI   Miguelina was seen for follow-up and for medication management.  Miguelina reports that over the past couple months she has been having increased mood swings.  States that she will have 1 to 2 days where she feels increased energy, insomnia and more anxious.  Then states that she will \"crash\" for 3 to 4 days and feel more lethargic, spend much of her time on the couch.  She has been taking medications as prescribed.  Reports that continues to have stressors related to her daughter, Samantha.  States that she sent her a birthday card in October and received a [employer] card from Samantha but has not had any other communication with her.  Her other children speak with Samantha and Miguelina reports that they all have concerns about her mental health.  Supportive psychotherapy provided.  She has a good supportive relationship with her spouse.  Physically states that she has been tolerating her pain from fibromyalgia.  Gabapentin has been helpful.  Follows with rheumatology and her family doctor on a regular basis.  States that she was recently diagnosed osteopenia and started on Caltrate in place of vitamin D.  Medication list was updated.  Past Medical History:   Diagnosis Date    Anxiety     Depression     Ovarian cyst     Urinary incontinence        Past Surgical History:   Procedure Laterality Date    BACK SURGERY      fusion    CERVICAL SPINE SURGERY      CYSTOSCOPY  03/13/2018         OOPHORECTOMY Right     LA LAPAROSCOPY SLING OPERATION STRESS INCONT N/A 4/24/2018    Procedure: INSERTION PUBOVAGINAL SLING (FEMALE) Trans obturator mid urethral sling insertion, CYSTOSCOPY; "  Surgeon: James Mesa MD;  [facility];  Service: Urology       Current Outpatient Medications   Medication Sig Dispense Refill    ARIPiprazole (ABILIFY) 15 mg tablet Take 1 tablet (15 mg total) by mouth daily 90 tablet 1    Calcium Carbonate (CALTRATE 600 PO) Take by mouth      DULoxetine (CYMBALTA) 60 mg delayed release capsule Take 1 capsule (60 mg total) by mouth daily 90 capsule 1    gabapentin (NEURONTIN) 400 mg capsule Take 2 capsules (800 mg total) by mouth 3 (three) times a day 540 capsule 1    cholecalciferol (VITAMIN D3) 1,000 units tablet Take 1,000 Units by mouth daily      lamoTRIgine (LaMICtal) 25 mg tablet TAKE EIGHT TABLETS BY MOUTH AT BEDTIME 720 tablet 1    omeprazole (PriLOSEC) 10 mg delayed release capsule Take 10 mg by mouth daily      traZODone (DESYREL) 50 mg tablet TAKE THREE TABLETS BY MOUTH AT BEDTIME AS NEEDED 270 tablet 1     No current facility-administered medications for this visit.        Allergies   Allergen Reactions    Azithromycin Rash    Erythromycin Rash       Review of Systems  As noted in HPI  Video Exam    There were no vitals filed for this visit.    Physical Exam   Mental status exam:  Speech is clear, coherent, normal rate and volume  Good eye contact, good hygiene  Affect is currently appropriate, mood is labile  No suicidal or homicidal ideation  Linear and coherent thought process  No psychotic signs or symptoms noted, no delusions were voiced  Cognition appears intact  Insight and judgment is intact    Medications and treatment plan as follows:  Increase Abilify to 15 mg daily due to mood lability  Continue with other medications as follows:  Gabapentin 800 mg 3 times daily  Cymbalta 60 mg daily  Lamictal 200 mg daily   trazodone 50 mg 2-3 at bedtime as needed  Aware of potential for adverse effects with medications including movement disorder, hyperglycemia, hypercholesterolemia, weight gain, serotonin syndrome, rash  She will follow-up in 2 months and  call me sooner if any questions or concerns    She will follow up on Friday, March 8 at 9 AM for virtual appointment    Visit Time    Visit Start Time: 0855  Visit Stop Time: 0917  Total Visit Duration:  23 minutes

## 2024-01-15 ENCOUNTER — TELEPHONE (OUTPATIENT)
Dept: PSYCHIATRY | Facility: CLINIC | Age: 59
End: 2024-01-15

## 2024-01-15 DIAGNOSIS — F31.62 BIPOLAR 1 DISORDER, MIXED, MODERATE (HCC): Primary | ICD-10-CM

## 2024-01-15 RX ORDER — ARIPIPRAZOLE 10 MG/1
10 TABLET ORAL DAILY
Qty: 90 TABLET | Refills: 1 | Status: SHIPPED | OUTPATIENT
Start: 2024-01-15

## 2024-01-15 NOTE — TELEPHONE ENCOUNTER
Since Abilify was raised 2 weeks ago, ross was unable to tolerate it, so she went back down to the original dosage after it started making her dizzy

## 2024-01-15 NOTE — TELEPHONE ENCOUNTER
Followed up with Miguelina.  Informed her that Kaitlin is ok with her going back down to 10 MG of Abiliy daily.  Also informed her that Kaitlin sent refills to [facility] for her.  She will call the office if she has any further concerns.

## 2024-01-15 NOTE — TELEPHONE ENCOUNTER
Attempted to call Miguelina to inform her she can go back to 10 MG of Abilify.  Voice mail not set up yet.

## 2024-02-21 PROBLEM — Z12.39 BREAST CANCER SCREENING: Status: RESOLVED | Noted: 2019-10-02 | Resolved: 2024-02-21

## 2024-02-21 PROBLEM — Z12.11 COLON CANCER SCREENING: Status: RESOLVED | Noted: 2019-10-02 | Resolved: 2024-02-21

## 2024-02-21 PROBLEM — R05.8 COUGH WITH EXPECTORATION: Status: RESOLVED | Noted: 2019-04-16 | Resolved: 2024-02-21

## 2024-02-23 ENCOUNTER — OFFICE VISIT (OUTPATIENT)
Dept: FAMILY MEDICINE CLINIC | Facility: CLINIC | Age: 59
End: 2024-02-23
Payer: COMMERCIAL

## 2024-02-23 ENCOUNTER — TELEPHONE (OUTPATIENT)
Age: 59
End: 2024-02-23

## 2024-02-23 VITALS
WEIGHT: 173 LBS | OXYGEN SATURATION: 97 % | TEMPERATURE: 97.5 F | HEIGHT: 67 IN | DIASTOLIC BLOOD PRESSURE: 72 MMHG | RESPIRATION RATE: 16 BRPM | HEART RATE: 74 BPM | BODY MASS INDEX: 27.15 KG/M2 | SYSTOLIC BLOOD PRESSURE: 124 MMHG

## 2024-02-23 DIAGNOSIS — R94.4 DECREASED GFR: Primary | ICD-10-CM

## 2024-02-23 DIAGNOSIS — R10.13 EPIGASTRIC PAIN: Primary | ICD-10-CM

## 2024-02-23 DIAGNOSIS — N18.31 STAGE 3A CHRONIC KIDNEY DISEASE (HCC): ICD-10-CM

## 2024-02-23 DIAGNOSIS — F33.2 MDD (MAJOR DEPRESSIVE DISORDER), RECURRENT SEVERE, WITHOUT PSYCHOSIS (HCC): ICD-10-CM

## 2024-02-23 DIAGNOSIS — F31.9 BIPOLAR 1 DISORDER (HCC): ICD-10-CM

## 2024-02-23 DIAGNOSIS — R10.11 RUQ PAIN: ICD-10-CM

## 2024-02-23 PROCEDURE — 99214 OFFICE O/P EST MOD 30 MIN: CPT | Performed by: NURSE PRACTITIONER

## 2024-02-23 RX ORDER — OMEPRAZOLE 40 MG/1
40 CAPSULE, DELAYED RELEASE ORAL
Qty: 90 CAPSULE | Refills: 1 | Status: SHIPPED | OUTPATIENT
Start: 2024-02-23 | End: 2024-08-21

## 2024-02-23 RX ORDER — SUCRALFATE ORAL 1 G/10ML
1 SUSPENSION ORAL 4 TIMES DAILY
Qty: 1200 ML | Refills: 1 | Status: SHIPPED | OUTPATIENT
Start: 2024-02-23 | End: 2024-03-24

## 2024-02-23 NOTE — ASSESSMENT & PLAN NOTE
- Stable on Abilify, Cymbalta, Lamictal, and trazodone. Continue same.   - Continue routine follow up with Psychiatrist.

## 2024-02-23 NOTE — ASSESSMENT & PLAN NOTE
- Will increase omeprazole to 40 mg daily.   - Prescription sent for carafate 4 times daily.   - Will obtain RUQ US.   - Referred to GI.   - Will continue to follow up.

## 2024-02-23 NOTE — TELEPHONE ENCOUNTER
Could just be due to dehydration. I placed repeat CMP for her to get at her convenience. Make sure she stays well hydrated and we will follow up once we get results.

## 2024-02-23 NOTE — TELEPHONE ENCOUNTER
Patient saw that she has a diagnosis of stage 3a chronic kidney disease on her office visit notes and was not aware she had this diagnosis. She was advised that it was based off of her previous labs. Patient would like to know if her PCP feels that she should be concerned and if she should still wait until June to have her labs repeated. She would like a call back to advise.

## 2024-02-23 NOTE — PROGRESS NOTES
Name: Miguelina Russo      : 1965      MRN: [id number]  Encounter Provider: OLEGARIO Morocho  Encounter Date: 2024   Encounter department: [facility]    Assessment & Plan     1. Epigastric pain  Assessment & Plan:  - Will increase omeprazole to 40 mg daily.   - Prescription sent for carafate 4 times daily.   - Will obtain RUQ US.   - Referred to GI.   - Will continue to follow up.     Orders:  -     US right upper quadrant; Future; Expected date: 2024  -     omeprazole (PriLOSEC) 40 MG capsule; Take 1 capsule (40 mg total) by mouth daily before breakfast  -     Ambulatory Referral to Gastroenterology; Future  -     sucralfate (CARAFATE) 1 g/10 mL suspension; Take 10 mL (1 g total) by mouth 4 (four) times a day    2. RUQ pain  Assessment & Plan:  - Will obtain RUQ US.     Orders:  -     US right upper quadrant; Future; Expected date: 2024  -     Ambulatory Referral to Gastroenterology; Future  -     sucralfate (CARAFATE) 1 g/10 mL suspension; Take 10 mL (1 g total) by mouth 4 (four) times a day    3. Stage 3a chronic kidney disease (HCC)  Assessment & Plan:  Lab Results   Component Value Date    EGFR 53 2023    EGFR 71 10/19/2022    EGFR 99 2019    CREATININE 1.12 2023    CREATININE 0.93 10/19/2022    CREATININE 0.76 2021   - GFR low.   - Increase oral hydration.   - Due for updated CMP. If no improvement consider referral to Nephrology.       4. Bipolar 1 disorder (HCC)  Assessment & Plan:  - Stable on Abilify, Cymbalta, Lamictal, and trazodone. Continue same.   - Continue routine follow up with Psychiatrist.       5. MDD (major depressive disorder), recurrent severe, without psychosis (HCC)  Assessment & Plan:  - Stable on current medication regimen.   - Continue routine follow up with Psychiatrist.              Subjective     Patient presents to office today with complaints of RUQ abdominal pain happening for a few times per  week. Pain is stabbing in nature. Does not have relationship to eating. She does have a history of ulcers. Last EGD was over 30 years ago. She is currently on omeprazole 10 mg daily which she has been taking. Still has her gallbladder. Denies any other concerns or complaints today.           Review of Systems   Constitutional:  Negative for fatigue and fever.   HENT:  Negative for trouble swallowing.    Eyes:  Negative for visual disturbance.   Respiratory:  Negative for cough and shortness of breath.    Cardiovascular:  Negative for chest pain and palpitations.   Gastrointestinal:  Positive for abdominal pain. Negative for blood in stool.   Endocrine: Negative for cold intolerance and heat intolerance.   Genitourinary:  Negative for difficulty urinating and dysuria.   Musculoskeletal:  Negative for gait problem.   Skin:  Negative for rash.   Neurological:  Negative for dizziness, syncope and headaches.   Hematological:  Negative for adenopathy.   Psychiatric/Behavioral:  Negative for behavioral problems.        Past Medical History:   Diagnosis Date   • Anxiety    • Depression    • Ovarian cyst    • Urinary incontinence      Past Surgical History:   Procedure Laterality Date   • BACK SURGERY      fusion   • CERVICAL SPINE SURGERY     • CYSTOSCOPY  03/13/2018        • OOPHORECTOMY Right    • [place] LAPAROSCOPY SLING OPERATION STRESS INCONT N/A 4/24/2018    Procedure: INSERTION PUBOVAGINAL SLING (FEMALE) Trans obturator mid urethral sling insertion, CYSTOSCOPY;  Surgeon: James Mesa MD;  Location[facility];  Service: Urology     Family History   Problem Relation Age of Onset   • No Known Problems Mother    • No Known Problems Father    • No Known Problems Sister    • No Known Problems Sister    • No Known Problems Daughter    • No Known Problems Daughter    • No Known Problems Daughter    • No Known Problems Maternal Grandmother    • No Known Problems Maternal Grandfather    • No Known Problems Paternal  Grandmother    • No Known Problems Paternal Grandfather    • No Known Problems Paternal Aunt    • Breast cancer Neg Hx      Social History     Socioeconomic History   • Marital status: /Civil Union     Spouse name: None   • Number of children: None   • Years of education: None   • Highest education level: None   Occupational History   • None   Tobacco Use   • Smoking status: Former     Current packs/day: 0.50     Types: Cigarettes   • Smokeless tobacco: Never   Vaping Use   • Vaping status: Never Used   Substance and Sexual Activity   • Alcohol use: Yes     Comment: social    • Drug use: No   • Sexual activity: None   Other Topics Concern   • None   Social History Narrative   • None     Social Determinants of Health     Financial Resource Strain: Not on file   Food Insecurity: Not on file   Transportation Needs: Not on file   Physical Activity: Not on file   Stress: Not on file   Social Connections: Not on file   Intimate Partner Violence: Not on file   Housing Stability: Not on file     Current Outpatient Medications on File Prior to Visit   Medication Sig   • ARIPiprazole (ABILIFY) 10 mg tablet Take 1 tablet (10 mg total) by mouth daily   • Calcium Carbonate (CALTRATE 600 PO) Take by mouth   • DULoxetine (CYMBALTA) 60 mg delayed release capsule Take 1 capsule (60 mg total) by mouth daily   • gabapentin (NEURONTIN) 400 mg capsule Take 2 capsules (800 mg total) by mouth 3 (three) times a day   • lamoTRIgine (LaMICtal) 25 mg tablet TAKE EIGHT TABLETS BY MOUTH AT BEDTIME   • traZODone (DESYREL) 50 mg tablet TAKE THREE TABLETS BY MOUTH AT BEDTIME AS NEEDED   • [DISCONTINUED] omeprazole (PriLOSEC) 10 mg delayed release capsule Take 10 mg by mouth daily     Allergies   Allergen Reactions   • Tylenol With Codeine #3 [Acetaminophen-Codeine] Hyperactivity   • Azithromycin Rash   • Erythromycin Rash     Immunization History   Administered Date(s) Administered   • INFLUENZA 11/17/2014, 12/02/2015, 01/24/2018   •  "Pneumococcal Conjugate Vaccine 20-valent (Pcv20), Polysace 06/09/2023       Objective     /72 (BP Location: Left arm, Patient Position: Sitting, Cuff Size: Large)   Pulse 74   Temp 97.5 °F (36.4 °C) (Tympanic)   Resp 16   Ht 5' 7\" (1.702 m)   Wt 78.5 kg (173 lb)   SpO2 97%   BMI 27.10 kg/m²     Physical Exam  Vitals and nursing note reviewed.   Constitutional:       General: She is not in acute distress.     Appearance: Normal appearance. She is not ill-appearing.   HENT:      Head: Normocephalic and atraumatic.      Right Ear: External ear normal.      Left Ear: External ear normal.   Eyes:      Conjunctiva/sclera: Conjunctivae normal.   Cardiovascular:      Rate and Rhythm: Normal rate and regular rhythm.      Heart sounds: Normal heart sounds.   Pulmonary:      Effort: Pulmonary effort is normal.      Breath sounds: Normal breath sounds.   Abdominal:      General: Bowel sounds are normal.      Palpations: Abdomen is soft.      Tenderness: There is abdominal tenderness in the epigastric area. Negative signs include Campos's sign.   Musculoskeletal:         General: Normal range of motion.      Cervical back: Normal range of motion.   Skin:     General: Skin is warm and dry.   Neurological:      Mental Status: She is alert and oriented to person, place, and time.   Psychiatric:         Mood and Affect: Mood normal.         Behavior: Behavior normal.       OLEGARIO Morocho    "

## 2024-03-01 ENCOUNTER — TELEPHONE (OUTPATIENT)
Age: 59
End: 2024-03-01

## 2024-03-01 ENCOUNTER — HOSPITAL ENCOUNTER (OUTPATIENT)
Dept: RADIOLOGY | Facility: IMAGING CENTER | Age: 59
End: 2024-03-01
Payer: COMMERCIAL

## 2024-03-01 DIAGNOSIS — R10.13 EPIGASTRIC PAIN: Primary | ICD-10-CM

## 2024-03-01 DIAGNOSIS — K80.20 GALLSTONES: ICD-10-CM

## 2024-03-01 DIAGNOSIS — R10.11 RUQ PAIN: ICD-10-CM

## 2024-03-01 DIAGNOSIS — R10.13 EPIGASTRIC PAIN: ICD-10-CM

## 2024-03-01 PROCEDURE — 76705 ECHO EXAM OF ABDOMEN: CPT

## 2024-03-01 NOTE — TELEPHONE ENCOUNTER
US shows gallstones. I recommend she follow up with GI. Also recommend referral to general surgery.

## 2024-03-01 NOTE — TELEPHONE ENCOUNTER
Aarti from [facility] called to alert Dr. Leblanc of significant findings on the Rt upper quad US completed today. Please review the US report.

## 2024-03-02 ENCOUNTER — APPOINTMENT (OUTPATIENT)
Dept: LAB | Age: 59
End: 2024-03-02
Payer: COMMERCIAL

## 2024-03-02 DIAGNOSIS — E78.49 OTHER HYPERLIPIDEMIA: ICD-10-CM

## 2024-03-02 DIAGNOSIS — R73.9 HYPERGLYCEMIA: ICD-10-CM

## 2024-03-02 DIAGNOSIS — E55.9 VITAMIN D INSUFFICIENCY: ICD-10-CM

## 2024-03-02 DIAGNOSIS — R94.4 DECREASED GFR: ICD-10-CM

## 2024-03-06 ENCOUNTER — TELEPHONE (OUTPATIENT)
Dept: FAMILY MEDICINE CLINIC | Facility: CLINIC | Age: 59
End: 2024-03-06

## 2024-03-06 NOTE — TELEPHONE ENCOUNTER
----- Message from Mary Berger sent at 3/6/2024  9:35 AM EST -----  Regarding: FW: Blood work  Contact: [phone]  There is a CMP ordered but I was not sure if PCP wanted a specific kidney function test   ----- Message -----  From: Miguelina Russo  Sent: 3/6/2024   4:20 AM EST  To: Primary Care [facility]  Subject: Blood work                                       The test for kidney function has not been sent in.  Please advise me if it is still needed please.

## 2024-03-06 NOTE — TELEPHONE ENCOUNTER
Spoke with pt and informed pt there are fasting lab orders that were placed on 3/2/24. Pt will complete lab orders.

## 2024-03-08 ENCOUNTER — TELEMEDICINE (OUTPATIENT)
Dept: PSYCHIATRY | Facility: CLINIC | Age: 59
End: 2024-03-08

## 2024-03-08 ENCOUNTER — TELEPHONE (OUTPATIENT)
Dept: PSYCHIATRY | Facility: CLINIC | Age: 59
End: 2024-03-08

## 2024-03-08 DIAGNOSIS — F31.62 BIPOLAR 1 DISORDER, MIXED, MODERATE (HCC): Primary | ICD-10-CM

## 2024-03-08 DIAGNOSIS — F41.9 ANXIETY: ICD-10-CM

## 2024-03-08 DIAGNOSIS — M79.7 FIBROMYALGIA: ICD-10-CM

## 2024-03-08 RX ORDER — LUMATEPERONE 21 MG/1
21 CAPSULE ORAL
Qty: 30 CAPSULE | Refills: 1 | Status: SHIPPED | OUTPATIENT
Start: 2024-03-08 | End: 2024-03-11

## 2024-03-08 NOTE — PSYCH
This note was not shared with the patient due to reasonable likelihood of causing patient harm    Virtual Regular Visit    Verification of patient location:    Patient is located at Home in the following state in which I hold an active license [place]      Assessment/Plan:    Problem List Items Addressed This Visit          Other    Fibromyalgia     Other Visit Diagnoses       Bipolar 1 disorder, mixed, moderate (HCC)    -  Primary    Relevant Medications    Lumateperone Tosylate (Caplyta) 21 MG CAPS    Anxiety                Goals addressed in session: Goal 1          Reason for visit is   Chief Complaint   Patient presents with    Follow-up    Medication Management    Virtual Regular Visit          Encounter provider Clementine Campuzano PA-C    Provider located at [facility]  [address] [address]      Recent Visits  No visits were found meeting these conditions.  Showing recent visits within past 7 days and meeting all other requirements  Today's Visits  Date Type Provider Dept   03/08/24 Telemedicine Clementine Campuzano PA-C  Psychiatric [facility]   Showing today's visits and meeting all other requirements  Future Appointments  No visits were found meeting these conditions.  Showing future appointments within next 150 days and meeting all other requirements       The patient was identified by name and date of birth. Miguelina Pulliampilloky was informed that this is a telemedicine visit and that the visit is being conducted throughthe Epic Embedded platform. She agrees to proceed..  My office door was closed. No one else was in the room.  She acknowledged consent and understanding of privacy and security of the video platform. The patient has agreed to participate and understands they can discontinue the visit at any time.    Patient is aware this is a billable service.     Subjective  Miguelina Pulliampilloky is a 58 y.o. female with  history of bipolar disorder and anxiety.      STEWART Mcintyre was seen for follow-up and for medication management.  At her last visit, Abilify was increased to 15 mg due to mood lability.  She was unable to tolerate this dosage so was reduced back down to 10 mg.  States that she has not been doing well with her moods.  Continues to have mood swings.  States that she is not sleeping well at night will sleep about 4 hours for several nights.  She has days where she is more depressed than days where she feels okay.  She has been on Seroquel and olanzapine in the past.  Has been consistently taking her other medications.  States that fibromyalgia pain has been the same.  Medically she has been having increased abdominal pain.  She was evaluated by her PCP and had an ultrasound completed that showed gallstones.  She is now being referred to gastroenterology and general surgery.  Also has an upcoming endoscopy  Medication list reviewed and updated.  Started on omeprazole and Carafate.  States that her appetite is decreased.  States that she is typically only eating one meal per day in the evening.  Encouraged her to try to eat small amounts throughout the day which she will try.  States that her spouse was a good support.  Her son and her  daughter are currently living with them and states that this has been going okay.    Past Medical History:   Diagnosis Date    Anxiety     Depression     Ovarian cyst     Urinary incontinence        Past Surgical History:   Procedure Laterality Date    BACK SURGERY      fusion    CERVICAL SPINE SURGERY      CYSTOSCOPY  03/13/2018         OOPHORECTOMY Right     ME LAPAROSCOPY SLING OPERATION STRESS INCONT N/A 4/24/2018    Procedure: INSERTION PUBOVAGINAL SLING (FEMALE) Trans obturator mid urethral sling insertion, CYSTOSCOPY;  Surgeon: James Mesa MD;  [facility];  Service: Urology       Current Outpatient Medications   Medication Sig Dispense Refill     Lumateperone Tosylate (Caplyta) 21 MG CAPS Take 21 mg by mouth daily at bedtime 30 capsule 1    ARIPiprazole (ABILIFY) 10 mg tablet Take 1 tablet (10 mg total) by mouth daily 90 tablet 1    Calcium Carbonate (CALTRATE 600 PO) Take by mouth      DULoxetine (CYMBALTA) 60 mg delayed release capsule Take 1 capsule (60 mg total) by mouth daily 90 capsule 1    gabapentin (NEURONTIN) 400 mg capsule Take 2 capsules (800 mg total) by mouth 3 (three) times a day 540 capsule 1    lamoTRIgine (LaMICtal) 25 mg tablet TAKE EIGHT TABLETS BY MOUTH AT BEDTIME 720 tablet 1    omeprazole (PriLOSEC) 40 MG capsule Take 1 capsule (40 mg total) by mouth daily before breakfast 90 capsule 1    sucralfate (CARAFATE) 1 g/10 mL suspension Take 10 mL (1 g total) by mouth 4 (four) times a day 1200 mL 1    traZODone (DESYREL) 50 mg tablet TAKE THREE TABLETS BY MOUTH AT BEDTIME AS NEEDED 270 tablet 1     No current facility-administered medications for this visit.        Allergies   Allergen Reactions    Tylenol With Codeine #3 [Acetaminophen-Codeine] Hyperactivity    Azithromycin Rash    Erythromycin Rash       Review of Systems  As noted in HPI  Video Exam    There were no vitals filed for this visit.    Physical Exam   Mental status exam:   Speech is clear, coherent, normal rate and volume  Adequate hygiene, good eye contact,  Appears to have psychomotor retardation  Affect is dysthymic, anxious, labile  Mood is congruent  Thought process is currently goal directed  No suicidal or homicidal ideation  No psychotic signs or symptoms noted, no hallucinations and no delusions were voiced  Cognition is intact  Insight and judgment is intact    Medications and treatment as follows:  Start Caplyta 21 mg in the evening    Discontinue Abilify 10 mg daily when Caplyta is started  Discussed with patient she will likely need prior authorization and she verbalized understanding and agreement with treatment plan    Gabapentin 800 mg 3 times daily    Cymbalta  60 mg daily    Lamictal 200 mg daily     trazodone 50 mg 2-3 at bedtime as needed    Was on seroquel zyprexa and most recently Abilify with subtherapeutic results or adverse effects    We will follow-up in 2 weeks and she will call me sooner if any questions or concerns      Will plan to follow-up on Friday, March 22, 2024 at 11:30 AM for virtual appointment      Visit Time    Visit Start Time: 0854  Visit Stop Time: 919  Total Visit Duration:  25 minutes

## 2024-03-08 NOTE — TELEPHONE ENCOUNTER
Prior Authorization for Caplyta 21 MG cap faxed to [facility] via [employer].  Response received that Caplyta has been approved effective 1/1/24 - 12/31/24.    Called pharmacy and informed them of approval.      Attempted to reach Miguelina twice but I was unable to LM because her VM was not set up.

## 2024-03-09 ENCOUNTER — APPOINTMENT (OUTPATIENT)
Dept: LAB | Age: 59
End: 2024-03-09
Payer: COMMERCIAL

## 2024-03-09 LAB
25(OH)D3 SERPL-MCNC: 50.5 NG/ML (ref 30–100)
ALBUMIN SERPL BCP-MCNC: 4.4 G/DL (ref 3.5–5)
ALP SERPL-CCNC: 64 U/L (ref 34–104)
ALT SERPL W P-5'-P-CCNC: 14 U/L (ref 7–52)
ANION GAP SERPL CALCULATED.3IONS-SCNC: 6 MMOL/L
AST SERPL W P-5'-P-CCNC: 14 U/L (ref 13–39)
BASOPHILS # BLD AUTO: 0.07 THOUSANDS/ÂΜL (ref 0–0.1)
BASOPHILS NFR BLD AUTO: 1 % (ref 0–1)
BILIRUB SERPL-MCNC: 0.51 MG/DL (ref 0.2–1)
BUN SERPL-MCNC: 15 MG/DL (ref 5–25)
CALCIUM SERPL-MCNC: 9.4 MG/DL (ref 8.4–10.2)
CHLORIDE SERPL-SCNC: 108 MMOL/L (ref 96–108)
CHOLEST SERPL-MCNC: 143 MG/DL
CO2 SERPL-SCNC: 31 MMOL/L (ref 21–32)
CREAT SERPL-MCNC: 0.87 MG/DL (ref 0.6–1.3)
EOSINOPHIL # BLD AUTO: 0.25 THOUSAND/ÂΜL (ref 0–0.61)
EOSINOPHIL NFR BLD AUTO: 4 % (ref 0–6)
ERYTHROCYTE [DISTWIDTH] IN BLOOD BY AUTOMATED COUNT: 13.7 % (ref 11.6–15.1)
EST. AVERAGE GLUCOSE BLD GHB EST-MCNC: 111 MG/DL
GFR SERPL CREATININE-BSD FRML MDRD: 72 ML/MIN/1.73SQ M
GLUCOSE P FAST SERPL-MCNC: 97 MG/DL (ref 65–99)
HBA1C MFR BLD: 5.5 %
HCT VFR BLD AUTO: 44.7 % (ref 34.8–46.1)
HDLC SERPL-MCNC: 60 MG/DL
HGB BLD-MCNC: 14.7 G/DL (ref 11.5–15.4)
IMM GRANULOCYTES # BLD AUTO: 0.01 THOUSAND/UL (ref 0–0.2)
IMM GRANULOCYTES NFR BLD AUTO: 0 % (ref 0–2)
LDLC SERPL CALC-MCNC: 68 MG/DL (ref 0–100)
LYMPHOCYTES # BLD AUTO: 1.86 THOUSANDS/ÂΜL (ref 0.6–4.47)
LYMPHOCYTES NFR BLD AUTO: 32 % (ref 14–44)
MCH RBC QN AUTO: 32.5 PG (ref 26.8–34.3)
MCHC RBC AUTO-ENTMCNC: 32.9 G/DL (ref 31.4–37.4)
MCV RBC AUTO: 99 FL (ref 82–98)
MONOCYTES # BLD AUTO: 0.43 THOUSAND/ÂΜL (ref 0.17–1.22)
MONOCYTES NFR BLD AUTO: 7 % (ref 4–12)
NEUTROPHILS # BLD AUTO: 3.17 THOUSANDS/ÂΜL (ref 1.85–7.62)
NEUTS SEG NFR BLD AUTO: 56 % (ref 43–75)
NRBC BLD AUTO-RTO: 0 /100 WBCS
PLATELET # BLD AUTO: 163 THOUSANDS/UL (ref 149–390)
PMV BLD AUTO: 11.8 FL (ref 8.9–12.7)
POTASSIUM SERPL-SCNC: 4.8 MMOL/L (ref 3.5–5.3)
PROT SERPL-MCNC: 6.4 G/DL (ref 6.4–8.4)
RBC # BLD AUTO: 4.53 MILLION/UL (ref 3.81–5.12)
SODIUM SERPL-SCNC: 145 MMOL/L (ref 135–147)
TRIGL SERPL-MCNC: 75 MG/DL
TSH SERPL DL<=0.05 MIU/L-ACNC: 2.06 UIU/ML (ref 0.45–4.5)
WBC # BLD AUTO: 5.79 THOUSAND/UL (ref 4.31–10.16)

## 2024-03-09 PROCEDURE — 83036 HEMOGLOBIN GLYCOSYLATED A1C: CPT

## 2024-03-09 PROCEDURE — 85025 COMPLETE CBC W/AUTO DIFF WBC: CPT

## 2024-03-09 PROCEDURE — 36415 COLL VENOUS BLD VENIPUNCTURE: CPT

## 2024-03-09 PROCEDURE — 82306 VITAMIN D 25 HYDROXY: CPT

## 2024-03-09 PROCEDURE — 80061 LIPID PANEL: CPT

## 2024-03-09 PROCEDURE — 80053 COMPREHEN METABOLIC PANEL: CPT

## 2024-03-09 PROCEDURE — 84443 ASSAY THYROID STIM HORMONE: CPT

## 2024-03-11 DIAGNOSIS — F31.62 BIPOLAR 1 DISORDER, MIXED, MODERATE (HCC): ICD-10-CM

## 2024-03-11 RX ORDER — LAMOTRIGINE 25 MG/1
TABLET ORAL
Qty: 900 TABLET | Refills: 1 | Status: SHIPPED | OUTPATIENT
Start: 2024-03-11

## 2024-03-11 NOTE — TELEPHONE ENCOUNTER
Krzysztof was approved. But benji out of pocket is $500 per refill. She won't be able to afford her script

## 2024-03-15 ENCOUNTER — CONSULT (OUTPATIENT)
Dept: GASTROENTEROLOGY | Facility: CLINIC | Age: 59
End: 2024-03-15
Payer: COMMERCIAL

## 2024-03-15 ENCOUNTER — TELEPHONE (OUTPATIENT)
Dept: GASTROENTEROLOGY | Facility: MEDICAL CENTER | Age: 59
End: 2024-03-15

## 2024-03-15 VITALS
HEIGHT: 67 IN | DIASTOLIC BLOOD PRESSURE: 83 MMHG | WEIGHT: 176.2 LBS | TEMPERATURE: 96.7 F | HEART RATE: 95 BPM | SYSTOLIC BLOOD PRESSURE: 120 MMHG | BODY MASS INDEX: 27.65 KG/M2

## 2024-03-15 DIAGNOSIS — R13.19 ESOPHAGEAL DYSPHAGIA: Primary | ICD-10-CM

## 2024-03-15 DIAGNOSIS — R10.13 EPIGASTRIC PAIN: ICD-10-CM

## 2024-03-15 DIAGNOSIS — R10.11 RUQ PAIN: ICD-10-CM

## 2024-03-15 PROCEDURE — 99204 OFFICE O/P NEW MOD 45 MIN: CPT | Performed by: DIETITIAN, REGISTERED

## 2024-03-15 NOTE — PROGRESS NOTES
[facility] - Outpatient Consultation New Patient  Miguelina Russo 58 y.o. female MRN: [id number]  Encounter: [id number]          ASSESSMENT AND PLAN:    1.  Symptomatic cholelithiasis  2.  RUQ pain  Patient reports worsening episodic RUQ pain, sometimes in the epigastrium, which feels stabbing/sharp.  Last month her omeprazole was increased from 20 mg to 40 mg daily and she was started on sucralfate 4 times daily.  She has had no improvement in her symptoms with these medication adjustments.  She denies any nausea/vomiting.  RUQ US 3/1/2024 with multiple large mobile gallstones, the largest measuring 1.7 cm with no gallbladder wall thickening, CBD measures 7 mm with no choledocholithiasis visualized.  Also noted hepatomegaly and 9 x 10 mm echogenic lesion, likely hemangioma, can consider MRI abdomen with contrast for confirmation.      -Episodic RUQ sharp/stabbing pain seems consistent with symptomatic cholelithiasis.  Recommend keeping appointment with general surgery on 4/8/2024.  -Will rule out other causes of abdominal pain with EGD below.  -Can consider MRI abdomen with contrast at next office visit for further evaluation of likely liver hemangioma.      3.  Esophageal dysphagia  She notes a chronic history of intermittent heartburn which is well-controlled on omeprazole 20 mg daily at baseline.  She typically has maybe 2-3 episodes of heartburn a year.  Denies any nausea, vomiting, reflux, black or bloody stools, rectal bleeding.  She does however endorse esophageal dysphagia upon further questioning.  She reports all solid foods and pills tend to get momentarily stuck in her throat/chest and this resolves with water.  She denies any regurgitation/vomiting.  She reports this has been occurring daily for the last several years.    -Schedule EGD. Discussed what to expect in procedure and risks/benefits including risk of bleeding, infection, or perforation. Patient understands and  agrees to proceed with procedure. No further questions/concerns at present.   -For now, continue omeprazole at 40 mg once daily.  After EGD, can consider decreasing back to usual dosing of 20 mg daily if no significant inflammation/ulcers seen.        Follow up in office after procedure.    ________________________________________________________    HPI:  Miguelina Russo is a 58-year-old female with history of CKD stage 3a, fibromyalgia, hyperlipidemia, bipolar 1 disorder who presents for evaluation of epigastric/RUQ pain.    Reviewed family medicine visit 2/23/2024, at which time omeprazole was increased to 40 mg daily and Carafate was started 4 times daily.  RUQ US was also ordered.  RUQ US 3/1/2024 with multiple large mobile gallstones, the largest measuring 1.7 cm with no gallbladder wall thickening, CBD measures 7 mm with no choledocholithiasis visualized.  Also noted hepatomegaly and 9 x 10 mm echogenic lesion, likely hemangioma, can consider MRI abdomen with contrast for confirmation.  Noted patient also has a visit scheduled with general surgery on 4/8/2024.    Patient reports worsening episodic RUQ pain, sometimes in the epigastrium, which feels stabbing/sharp.  Last month her omeprazole was increased from 20 mg to 40 mg daily and she was started on sucralfate 4 times daily.  She has had no improvement in her symptoms with these medication adjustments.  She notes a chronic history of intermittent heartburn which is well-controlled on omeprazole 20 mg daily at baseline.  She typically has maybe 2-3 episodes of heartburn a year.  Denies any nausea, vomiting, reflux, black or bloody stools, rectal bleeding.  She does however endorse esophageal dysphagia upon further questioning.  She reports all solid foods and pills tend to get momentarily stuck in her throat/chest and this resolves with water.  She denies any regurgitation/vomiting.  She reports this has been occurring daily for the last several  years.    She also endorses chronic constipation which she attributes to her medications, including Abilify, Cymbalta, Lamictal, gabapentin.  She uses Colace daily and has a BM every 3 days.  She has never had a colonoscopy but had a normal Cologuard in 6/2023.        REVIEW OF SYSTEMS:  10 point ROS reviewed and negative, except as above    Historical Information   Past Medical History:   Diagnosis Date    Anxiety     Depression     Ovarian cyst     Urinary incontinence      Past Surgical History:   Procedure Laterality Date    BACK SURGERY      fusion    CERVICAL SPINE SURGERY      CYSTOSCOPY  03/13/2018         OOPHORECTOMY Right     NE LAPAROSCOPY SLING OPERATION STRESS INCONT N/A 4/24/2018    Procedure: INSERTION PUBOVAGINAL SLING (FEMALE) Trans obturator mid urethral sling insertion, CYSTOSCOPY;  Surgeon: James Mesa MD;  Location: [facility];  Service: Urology     Social History   Social History     Substance and Sexual Activity   Alcohol Use Yes    Comment: social      Social History     Substance and Sexual Activity   Drug Use No     Social History     Tobacco Use   Smoking Status Former    Current packs/day: 0.50    Types: Cigarettes   Smokeless Tobacco Never     Family History   Problem Relation Age of Onset    No Known Problems Mother     No Known Problems Father     No Known Problems Sister     No Known Problems Sister     No Known Problems Daughter     No Known Problems Daughter     No Known Problems Daughter     No Known Problems Maternal Grandmother     No Known Problems Maternal Grandfather     No Known Problems Paternal Grandmother     No Known Problems Paternal Grandfather     No Known Problems Paternal Aunt     Breast cancer Neg Hx        Meds/Allergies       Current Outpatient Medications:     ARIPiprazole (ABILIFY) 10 mg tablet    Calcium Carbonate (CALTRATE 600 PO)    DULoxetine (CYMBALTA) 60 mg delayed release capsule    gabapentin (NEURONTIN) 400 mg capsule    lamoTRIgine  (LaMICtal) 25 mg tablet    omeprazole (PriLOSEC) 40 MG capsule    sucralfate (CARAFATE) 1 g/10 mL suspension    traZODone (DESYREL) 50 mg tablet    Allergies   Allergen Reactions    Tylenol With Codeine #3 [Acetaminophen-Codeine] Hyperactivity    Azithromycin Rash    Erythromycin Rash           Objective   Wt Readings from Last 3 Encounters:   02/23/24 78.5 kg (173 lb)   12/22/23 78.9 kg (174 lb)   09/29/23 81.2 kg (179 lb)     Temp Readings from Last 3 Encounters:   02/23/24 97.5 °F (36.4 °C) (Tympanic)   12/22/23 97.5 °F (36.4 °C) (Tympanic)   06/09/23 97.6 °F (36.4 °C) (Tympanic)     BP Readings from Last 3 Encounters:   02/23/24 124/72   12/22/23 98/64   06/09/23 110/68     Pulse Readings from Last 3 Encounters:   02/23/24 74   12/22/23 77   06/09/23 65        PHYSICAL EXAM:     Physical Exam  Vitals reviewed.   Constitutional:       General: She is not in acute distress.     Appearance: She is well-developed.   HENT:      Head: Normocephalic and atraumatic.   Eyes:      Conjunctiva/sclera: Conjunctivae normal.   Cardiovascular:      Rate and Rhythm: Normal rate and regular rhythm.      Heart sounds: No murmur heard.  Pulmonary:      Effort: Pulmonary effort is normal. No respiratory distress.      Breath sounds: Normal breath sounds.   Abdominal:      General: Bowel sounds are normal. There is no distension.      Palpations: Abdomen is soft.      Tenderness: There is abdominal tenderness in the right upper quadrant and epigastric area. There is no guarding.   Musculoskeletal:         General: No swelling.      Cervical back: Neck supple.   Skin:     General: Skin is warm and dry.   Neurological:      Mental Status: She is alert.   Psychiatric:         Mood and Affect: Mood normal.             Lab Results:   No visits with results within 1 Day(s) from this visit.   Latest known visit with results is:   Appointment on 03/02/2024   Component Date Value    WBC 03/09/2024 5.79     RBC 03/09/2024 4.53     Hemoglobin  03/09/2024 14.7     Hematocrit 03/09/2024 44.7     MCV 03/09/2024 99 (H)     MCH 03/09/2024 32.5     MCHC 03/09/2024 32.9     RDW 03/09/2024 13.7     MPV 03/09/2024 11.8     Platelets 03/09/2024 163     nRBC 03/09/2024 0     Neutrophils Relative 03/09/2024 56     Immature Grans % 03/09/2024 0     Lymphocytes Relative 03/09/2024 32     Monocytes Relative 03/09/2024 7     Eosinophils Relative 03/09/2024 4     Basophils Relative 03/09/2024 1     Neutrophils Absolute 03/09/2024 3.17     Absolute Immature Grans 03/09/2024 0.01     Absolute Lymphocytes 03/09/2024 1.86     Absolute Monocytes 03/09/2024 0.43     Eosinophils Absolute 03/09/2024 0.25     Basophils Absolute 03/09/2024 0.07     Sodium 03/09/2024 145     Potassium 03/09/2024 4.8     Chloride 03/09/2024 108     CO2 03/09/2024 31     ANION GAP 03/09/2024 6     BUN 03/09/2024 15     Creatinine 03/09/2024 0.87     Glucose, Fasting 03/09/2024 97     Calcium 03/09/2024 9.4     AST 03/09/2024 14     ALT 03/09/2024 14     Alkaline Phosphatase 03/09/2024 64     Total Protein 03/09/2024 6.4     Albumin 03/09/2024 4.4     Total Bilirubin 03/09/2024 0.51     eGFR 03/09/2024 72     Cholesterol 03/09/2024 143     Triglycerides 03/09/2024 75     HDL, Direct 03/09/2024 60     LDL Calculated 03/09/2024 68     TSH 3RD GENERATON 03/09/2024 2.058     Hemoglobin A1C 03/09/2024 5.5     EAG 03/09/2024 111     Vit D, 25-Hydroxy 03/09/2024 50.5        Lab Results   Component Value Date    WBC 5.79 03/09/2024    HGB 14.7 03/09/2024    HCT 44.7 03/09/2024    MCV 99 (H) 03/09/2024     03/09/2024       Lab Results   Component Value Date    SODIUM 145 03/09/2024    K 4.8 03/09/2024     03/09/2024    CO2 31 03/09/2024    AGAP 6 03/09/2024    BUN 15 03/09/2024    CREATININE 0.87 03/09/2024    GLUC 95 10/19/2022    GLUF 97 03/09/2024    CALCIUM 9.4 03/09/2024    AST 14 03/09/2024    ALT 14 03/09/2024    ALKPHOS 64 03/09/2024    TP 6.4 03/09/2024    TBILI 0.51 03/09/2024    EGFR 72  03/09/2024         Radiology Results:   US right upper quadrant    Result Date: 3/1/2024  Narrative: RIGHT UPPER QUADRANT ULTRASOUND INDICATION: R10.13: Epigastric pain R10.11: Right upper quadrant pain. COMPARISON: None TECHNIQUE: Real-time ultrasound of the right upper quadrant was performed with a curvilinear transducer with both volumetric sweeps and still imaging techniques. FINDINGS: PANCREAS: Portions of the pancreas are obscured by bowel gas. Visualized portions of the pancreas are unremarkable. AORTA AND IVC: Visualized portions are normal for patient age. LIVER: Size: Moderately enlarged. The liver measures 18.0 cm in the midclavicular line. Contour: Surface contour is smooth. Parenchyma: Echogenicity and echotexture are within normal limits. 9 x 10 mm echogenic lesion is seen in the right lobe of the liver, likely hemangioma. Limited imaging of the main portal vein shows it to be patent and hepatopetal. BILIARY: Multiple large mobile gallstones are demonstrated, the largest measuring 1.7 cm. No gallbladder wall thickening or pericholecystic fluid is seen, Campos sign is negative. No intrahepatic biliary dilatation. CBD measures 7.0 mm. No choledocholithiasis. KIDNEY: Right kidney measures 10.5 x 4.6 x 4.9 cm. Volume 123.4 mL Kidney within normal limits. ASCITES: None.     Impression: 1. Cholelithiasis with no signs of acute cholecystitis. 2. Echogenic lesion in the right lobe of the liver likely hemangioma. Confirmation could be obtained with MRI abdomen with contrast if desired. The study was marked in EPIC for significant notification. Workstation performed: [id number]

## 2024-03-15 NOTE — TELEPHONE ENCOUNTER
Did you remind:    You will receive a call a day prior to let you know when to arrive.  Yes    Do you have a copy of your instructions? Yes    Did you remind them of special diets if applicable? NA    Did you remind patient to start clear liquid diet if applicable? NA    Did you remind patient to hold:  NA      Do you have any other questions or concerns? No

## 2024-03-15 NOTE — H&P (VIEW-ONLY)
[facility] - Outpatient Consultation New Patient  Miguelina Russo 58 y.o. female MRN: [id number]  Encounter: [id number]          ASSESSMENT AND PLAN:    1.  Symptomatic cholelithiasis  2.  RUQ pain  Patient reports worsening episodic RUQ pain, sometimes in the epigastrium, which feels stabbing/sharp.  Last month her omeprazole was increased from 20 mg to 40 mg daily and she was started on sucralfate 4 times daily.  She has had no improvement in her symptoms with these medication adjustments.  She denies any nausea/vomiting.  RUQ US 3/1/2024 with multiple large mobile gallstones, the largest measuring 1.7 cm with no gallbladder wall thickening, CBD measures 7 mm with no choledocholithiasis visualized.  Also noted hepatomegaly and 9 x 10 mm echogenic lesion, likely hemangioma, can consider MRI abdomen with contrast for confirmation.      -Episodic RUQ sharp/stabbing pain seems consistent with symptomatic cholelithiasis.  Recommend keeping appointment with general surgery on 4/8/2024.  -Will rule out other causes of abdominal pain with EGD below.  -Can consider MRI abdomen with contrast at next office visit for further evaluation of likely liver hemangioma.      3.  Esophageal dysphagia  She notes a chronic history of intermittent heartburn which is well-controlled on omeprazole 20 mg daily at baseline.  She typically has maybe 2-3 episodes of heartburn a year.  Denies any nausea, vomiting, reflux, black or bloody stools, rectal bleeding.  She does however endorse esophageal dysphagia upon further questioning.  She reports all solid foods and pills tend to get momentarily stuck in her throat/chest and this resolves with water.  She denies any regurgitation/vomiting.  She reports this has been occurring daily for the last several years.    -Schedule EGD. Discussed what to expect in procedure and risks/benefits including risk of bleeding, infection, or perforation. Patient understands and  agrees to proceed with procedure. No further questions/concerns at present.   -For now, continue omeprazole at 40 mg once daily.  After EGD, can consider decreasing back to usual dosing of 20 mg daily if no significant inflammation/ulcers seen.        Follow up in office after procedure.    ________________________________________________________    HPI:  Miguelina Russo is a 58-year-old female with history of CKD stage 3a, fibromyalgia, hyperlipidemia, bipolar 1 disorder who presents for evaluation of epigastric/RUQ pain.    Reviewed family medicine visit 2/23/2024, at which time omeprazole was increased to 40 mg daily and Carafate was started 4 times daily.  RUQ US was also ordered.  RUQ US 3/1/2024 with multiple large mobile gallstones, the largest measuring 1.7 cm with no gallbladder wall thickening, CBD measures 7 mm with no choledocholithiasis visualized.  Also noted hepatomegaly and 9 x 10 mm echogenic lesion, likely hemangioma, can consider MRI abdomen with contrast for confirmation.  Noted patient also has a visit scheduled with general surgery on 4/8/2024.    Patient reports worsening episodic RUQ pain, sometimes in the epigastrium, which feels stabbing/sharp.  Last month her omeprazole was increased from 20 mg to 40 mg daily and she was started on sucralfate 4 times daily.  She has had no improvement in her symptoms with these medication adjustments.  She notes a chronic history of intermittent heartburn which is well-controlled on omeprazole 20 mg daily at baseline.  She typically has maybe 2-3 episodes of heartburn a year.  Denies any nausea, vomiting, reflux, black or bloody stools, rectal bleeding.  She does however endorse esophageal dysphagia upon further questioning.  She reports all solid foods and pills tend to get momentarily stuck in her throat/chest and this resolves with water.  She denies any regurgitation/vomiting.  She reports this has been occurring daily for the last several  years.    She also endorses chronic constipation which she attributes to her medications, including Abilify, Cymbalta, Lamictal, gabapentin.  She uses Colace daily and has a BM every 3 days.  She has never had a colonoscopy but had a normal Cologuard in 6/2023.        REVIEW OF SYSTEMS:  10 point ROS reviewed and negative, except as above    Historical Information   Past Medical History:   Diagnosis Date    Anxiety     Depression     Ovarian cyst     Urinary incontinence      Past Surgical History:   Procedure Laterality Date    BACK SURGERY      fusion    CERVICAL SPINE SURGERY      CYSTOSCOPY  03/13/2018         OOPHORECTOMY Right     [place] LAPAROSCOPY SLING OPERATION STRESS INCONT N/A 4/24/2018    Procedure: INSERTION PUBOVAGINAL SLING (FEMALE) Trans obturator mid urethral sling insertion, CYSTOSCOPY;  Surgeon: James Mesa MD;  Location: [facility];  Service: Urology     Social History   Social History     Substance and Sexual Activity   Alcohol Use Yes    Comment: social      Social History     Substance and Sexual Activity   Drug Use No     Social History     Tobacco Use   Smoking Status Former    Current packs/day: 0.50    Types: Cigarettes   Smokeless Tobacco Never     Family History   Problem Relation Age of Onset    No Known Problems Mother     No Known Problems Father     No Known Problems Sister     No Known Problems Sister     No Known Problems Daughter     No Known Problems Daughter     No Known Problems Daughter     No Known Problems Maternal Grandmother     No Known Problems Maternal Grandfather     No Known Problems Paternal Grandmother     No Known Problems Paternal Grandfather     No Known Problems Paternal Aunt     Breast cancer Neg Hx        Meds/Allergies       Current Outpatient Medications:     ARIPiprazole (ABILIFY) 10 mg tablet    Calcium Carbonate (CALTRATE 600 PO)    DULoxetine (CYMBALTA) 60 mg delayed release capsule    gabapentin (NEURONTIN) 400 mg capsule    lamoTRIgine  (LaMICtal) 25 mg tablet    omeprazole (PriLOSEC) 40 MG capsule    sucralfate (CARAFATE) 1 g/10 mL suspension    traZODone (DESYREL) 50 mg tablet    Allergies   Allergen Reactions    Tylenol With Codeine #3 [Acetaminophen-Codeine] Hyperactivity    Azithromycin Rash    Erythromycin Rash           Objective   Wt Readings from Last 3 Encounters:   02/23/24 78.5 kg (173 lb)   12/22/23 78.9 kg (174 lb)   09/29/23 81.2 kg (179 lb)     Temp Readings from Last 3 Encounters:   02/23/24 97.5 °F (36.4 °C) (Tympanic)   12/22/23 97.5 °F (36.4 °C) (Tympanic)   06/09/23 97.6 °F (36.4 °C) (Tympanic)     BP Readings from Last 3 Encounters:   02/23/24 124/72   12/22/23 98/64   06/09/23 110/68     Pulse Readings from Last 3 Encounters:   02/23/24 74   12/22/23 77   06/09/23 65        PHYSICAL EXAM:     Physical Exam  Vitals reviewed.   Constitutional:       General: She is not in acute distress.     Appearance: She is well-developed.   HENT:      Head: Normocephalic and atraumatic.   Eyes:      Conjunctiva/sclera: Conjunctivae normal.   Cardiovascular:      Rate and Rhythm: Normal rate and regular rhythm.      Heart sounds: No murmur heard.  Pulmonary:      Effort: Pulmonary effort is normal. No respiratory distress.      Breath sounds: Normal breath sounds.   Abdominal:      General: Bowel sounds are normal. There is no distension.      Palpations: Abdomen is soft.      Tenderness: There is abdominal tenderness in the right upper quadrant and epigastric area. There is no guarding.   Musculoskeletal:         General: No swelling.      Cervical back: Neck supple.   Skin:     General: Skin is warm and dry.   Neurological:      Mental Status: She is alert.   Psychiatric:         Mood and Affect: Mood normal.             Lab Results:   No visits with results within 1 Day(s) from this visit.   Latest known visit with results is:   Appointment on 03/02/2024   Component Date Value    WBC 03/09/2024 5.79     RBC 03/09/2024 4.53     Hemoglobin  03/09/2024 14.7     Hematocrit 03/09/2024 44.7     MCV 03/09/2024 99 (H)     MCH 03/09/2024 32.5     MCHC 03/09/2024 32.9     RDW 03/09/2024 13.7     MPV 03/09/2024 11.8     Platelets 03/09/2024 163     nRBC 03/09/2024 0     Neutrophils Relative 03/09/2024 56     Immature Grans % 03/09/2024 0     Lymphocytes Relative 03/09/2024 32     Monocytes Relative 03/09/2024 7     Eosinophils Relative 03/09/2024 4     Basophils Relative 03/09/2024 1     Neutrophils Absolute 03/09/2024 3.17     Absolute Immature Grans 03/09/2024 0.01     Absolute Lymphocytes 03/09/2024 1.86     Absolute Monocytes 03/09/2024 0.43     Eosinophils Absolute 03/09/2024 0.25     Basophils Absolute 03/09/2024 0.07     Sodium 03/09/2024 145     Potassium 03/09/2024 4.8     Chloride 03/09/2024 108     CO2 03/09/2024 31     ANION GAP 03/09/2024 6     BUN 03/09/2024 15     Creatinine 03/09/2024 0.87     Glucose, Fasting 03/09/2024 97     Calcium 03/09/2024 9.4     AST 03/09/2024 14     ALT 03/09/2024 14     Alkaline Phosphatase 03/09/2024 64     Total Protein 03/09/2024 6.4     Albumin 03/09/2024 4.4     Total Bilirubin 03/09/2024 0.51     eGFR 03/09/2024 72     Cholesterol 03/09/2024 143     Triglycerides 03/09/2024 75     HDL, Direct 03/09/2024 60     LDL Calculated 03/09/2024 68     TSH 3RD GENERATON 03/09/2024 2.058     Hemoglobin A1C 03/09/2024 5.5     EAG 03/09/2024 111     Vit D, 25-Hydroxy 03/09/2024 50.5        Lab Results   Component Value Date    WBC 5.79 03/09/2024    HGB 14.7 03/09/2024    HCT 44.7 03/09/2024    MCV 99 (H) 03/09/2024     03/09/2024       Lab Results   Component Value Date    SODIUM 145 03/09/2024    K 4.8 03/09/2024     03/09/2024    CO2 31 03/09/2024    AGAP 6 03/09/2024    BUN 15 03/09/2024    CREATININE 0.87 03/09/2024    GLUC 95 10/19/2022    GLUF 97 03/09/2024    CALCIUM 9.4 03/09/2024    AST 14 03/09/2024    ALT 14 03/09/2024    ALKPHOS 64 03/09/2024    TP 6.4 03/09/2024    TBILI 0.51 03/09/2024    EGFR 72  03/09/2024         Radiology Results:   US right upper quadrant    Result Date: 3/1/2024  Narrative: RIGHT UPPER QUADRANT ULTRASOUND INDICATION: R10.13: Epigastric pain R10.11: Right upper quadrant pain. COMPARISON: None TECHNIQUE: Real-time ultrasound of the right upper quadrant was performed with a curvilinear transducer with both volumetric sweeps and still imaging techniques. FINDINGS: PANCREAS: Portions of the pancreas are obscured by bowel gas. Visualized portions of the pancreas are unremarkable. AORTA AND IVC: Visualized portions are normal for patient age. LIVER: Size: Moderately enlarged. The liver measures 18.0 cm in the midclavicular line. Contour: Surface contour is smooth. Parenchyma: Echogenicity and echotexture are within normal limits. 9 x 10 mm echogenic lesion is seen in the right lobe of the liver, likely hemangioma. Limited imaging of the main portal vein shows it to be patent and hepatopetal. BILIARY: Multiple large mobile gallstones are demonstrated, the largest measuring 1.7 cm. No gallbladder wall thickening or pericholecystic fluid is seen, Campos sign is negative. No intrahepatic biliary dilatation. CBD measures 7.0 mm. No choledocholithiasis. KIDNEY: Right kidney measures 10.5 x 4.6 x 4.9 cm. Volume 123.4 mL Kidney within normal limits. ASCITES: None.     Impression: 1. Cholelithiasis with no signs of acute cholecystitis. 2. Echogenic lesion in the right lobe of the liver likely hemangioma. Confirmation could be obtained with MRI abdomen with contrast if desired. The study was marked in EPIC for significant notification. Workstation performed: [id number]

## 2024-03-21 RX ORDER — SODIUM CHLORIDE 9 MG/ML
125 INJECTION, SOLUTION INTRAVENOUS CONTINUOUS
Status: CANCELLED | OUTPATIENT
Start: 2024-03-21

## 2024-03-22 ENCOUNTER — ANESTHESIA EVENT (OUTPATIENT)
Dept: GASTROENTEROLOGY | Facility: MEDICAL CENTER | Age: 59
End: 2024-03-22

## 2024-03-22 ENCOUNTER — ANESTHESIA (OUTPATIENT)
Dept: GASTROENTEROLOGY | Facility: MEDICAL CENTER | Age: 59
End: 2024-03-22

## 2024-03-22 ENCOUNTER — HOSPITAL ENCOUNTER (OUTPATIENT)
Dept: GASTROENTEROLOGY | Facility: MEDICAL CENTER | Age: 59
Setting detail: OUTPATIENT SURGERY
End: 2024-03-22
Payer: COMMERCIAL

## 2024-03-22 VITALS
DIASTOLIC BLOOD PRESSURE: 71 MMHG | WEIGHT: 172 LBS | HEIGHT: 67 IN | HEART RATE: 76 BPM | OXYGEN SATURATION: 96 % | RESPIRATION RATE: 18 BRPM | TEMPERATURE: 96.6 F | SYSTOLIC BLOOD PRESSURE: 117 MMHG | BODY MASS INDEX: 27 KG/M2

## 2024-03-22 DIAGNOSIS — R10.13 EPIGASTRIC PAIN: ICD-10-CM

## 2024-03-22 DIAGNOSIS — R13.19 ESOPHAGEAL DYSPHAGIA: ICD-10-CM

## 2024-03-22 DIAGNOSIS — R10.11 RUQ PAIN: ICD-10-CM

## 2024-03-22 PROCEDURE — C1726 CATH, BAL DIL, NON-VASCULAR: HCPCS

## 2024-03-22 PROCEDURE — 43249 ESOPH EGD DILATION <30 MM: CPT | Performed by: STUDENT IN AN ORGANIZED HEALTH CARE EDUCATION/TRAINING PROGRAM

## 2024-03-22 PROCEDURE — 43239 EGD BIOPSY SINGLE/MULTIPLE: CPT | Performed by: STUDENT IN AN ORGANIZED HEALTH CARE EDUCATION/TRAINING PROGRAM

## 2024-03-22 PROCEDURE — 88305 TISSUE EXAM BY PATHOLOGIST: CPT | Performed by: PATHOLOGY

## 2024-03-22 RX ORDER — SODIUM CHLORIDE 9 MG/ML
125 INJECTION, SOLUTION INTRAVENOUS CONTINUOUS
Status: DISCONTINUED | OUTPATIENT
Start: 2024-03-22 | End: 2024-03-26 | Stop reason: HOSPADM

## 2024-03-22 RX ORDER — LIDOCAINE HYDROCHLORIDE 20 MG/ML
INJECTION, SOLUTION EPIDURAL; INFILTRATION; INTRACAUDAL; PERINEURAL AS NEEDED
Status: DISCONTINUED | OUTPATIENT
Start: 2024-03-22 | End: 2024-03-22

## 2024-03-22 RX ORDER — PROPOFOL 10 MG/ML
INJECTION, EMULSION INTRAVENOUS AS NEEDED
Status: DISCONTINUED | OUTPATIENT
Start: 2024-03-22 | End: 2024-03-22

## 2024-03-22 RX ADMIN — PROPOFOL 50 MG: 10 INJECTION, EMULSION INTRAVENOUS at 11:16

## 2024-03-22 RX ADMIN — LIDOCAINE HYDROCHLORIDE 100 MG: 20 INJECTION, SOLUTION EPIDURAL; INFILTRATION; INTRACAUDAL at 11:13

## 2024-03-22 RX ADMIN — PROPOFOL 50 MG: 10 INJECTION, EMULSION INTRAVENOUS at 11:20

## 2024-03-22 RX ADMIN — SODIUM CHLORIDE 125 ML/HR: 0.9 INJECTION, SOLUTION INTRAVENOUS at 10:47

## 2024-03-22 RX ADMIN — PROPOFOL 150 MG: 10 INJECTION, EMULSION INTRAVENOUS at 11:13

## 2024-03-22 NOTE — ANESTHESIA POSTPROCEDURE EVALUATION
"Post-Op Assessment Note    CV Status:  Stable    Pain management: adequate       Mental Status:  Alert and awake   Hydration Status:  Euvolemic   PONV Controlled:  Controlled   Airway Patency:  Patent     Post Op Vitals Reviewed: Yes    No anethesia notable event occurred.    Staff: Anesthesiologist               BP      Temp      Pulse     Resp      SpO2      /71   Pulse 76   Temp (!) 96.6 °F (35.9 °C) (Temporal)   Resp 18   Ht 5' 7\" (1.702 m)   Wt 78 kg (172 lb)   SpO2 96%   BMI 26.94 kg/m²     "

## 2024-03-22 NOTE — INTERVAL H&P NOTE
H&P reviewed. After examining the patient I find no changes in the patients condition since the H&P had been written.    Vitals:    03/22/24 1031   BP: 138/82   Pulse: 76   Resp: 18   Temp: (!) 96.6 °F (35.9 °C)   SpO2: 94%

## 2024-03-22 NOTE — ANESTHESIA PREPROCEDURE EVALUATION
Procedure:  EGD    Relevant Problems   CARDIO   (+) Other hyperlipidemia      /RENAL   (+) Stage 3a chronic kidney disease (HCC)      MUSCULOSKELETAL   (+) Chronic bilateral low back pain without sciatica   (+) Fibromyalgia      NEURO/PSYCH   (+) Chronic bilateral low back pain without sciatica   (+) Fibromyalgia   (+) MDD (major depressive disorder), recurrent severe, without psychosis (HCC)   (+) Major depression, recurrent (HCC)   (+) Panic disorder        Physical Exam    Airway    Mallampati score: II  TM Distance: >3 FB  Neck ROM: full     Dental       Cardiovascular  Cardiovascular exam normal    Pulmonary  Pulmonary exam normal     Other Findings  post-pubertal.      Anesthesia Plan  ASA Score- 2     Anesthesia Type- IV sedation with anesthesia with ASA Monitors.         Additional Monitors:     Airway Plan:            Plan Factors-Exercise tolerance (METS): >4 METS.    Chart reviewed.        Patient is a current smoker.  Patient instructed to abstain from smoking on day of procedure. Patient did not smoke on day of surgery.            Induction- intravenous.    Postoperative Plan-     Informed Consent- Anesthetic plan and risks discussed with patient.

## 2024-03-29 ENCOUNTER — TELEMEDICINE (OUTPATIENT)
Dept: PSYCHIATRY | Facility: CLINIC | Age: 59
End: 2024-03-29

## 2024-03-29 DIAGNOSIS — F31.62 BIPOLAR 1 DISORDER, MIXED, MODERATE (HCC): Primary | ICD-10-CM

## 2024-03-29 DIAGNOSIS — F41.9 ANXIETY: ICD-10-CM

## 2024-03-29 DIAGNOSIS — Z51.81 MEDICATION MONITORING ENCOUNTER: ICD-10-CM

## 2024-03-29 DIAGNOSIS — M79.7 FIBROMYALGIA: ICD-10-CM

## 2024-03-29 RX ORDER — LURASIDONE HYDROCHLORIDE 20 MG/1
20 TABLET, FILM COATED ORAL
Qty: 30 TABLET | Refills: 1 | Status: SHIPPED | OUTPATIENT
Start: 2024-03-29

## 2024-03-29 RX ORDER — LAMOTRIGINE 25 MG/1
TABLET ORAL
Start: 2024-03-29

## 2024-03-29 NOTE — PSYCH
This note was not shared with the patient due to reasonable likelihood of causing patient harm    Virtual Regular Visit    Verification of patient location:    Patient is located at Home in the following state in which I hold an active license [place]      Assessment/Plan:    Problem List Items Addressed This Visit          Surgery/Wound/Pain    Fibromyalgia     Other Visit Diagnoses       Bipolar 1 disorder, mixed, moderate (HCC)    -  Primary    Anxiety        Medication monitoring encounter                Goals addressed in session: Goal 1          Reason for visit is   Chief Complaint   Patient presents with    Follow-up    Medication Management    Virtual Regular Visit          Encounter provider Clementine Campuzano PA-C    Provider located at [address] [id number]      Recent Visits  No visits were found meeting these conditions.  Showing recent visits within past 7 days and meeting all other requirements  Today's Visits  Date Type Provider Dept   03/29/24 Telemedicine Clementine Campuzano PA-C  Psychiatric [facility]   Showing today's visits and meeting all other requirements  Future Appointments  No visits were found meeting these conditions.  Showing future appointments within next 150 days and meeting all other requirements       The patient was identified by name and date of birth. Miguelina Pulliamhawatess was informed that this is a telemedicine visit and that the visit is being conducted throughthe Epic Embedded platform. She agrees to proceed..  My office door was closed. No one else was in the room.  She acknowledged consent and understanding of privacy and security of the video platform. The patient has agreed to participate and understands they can discontinue the visit at any time.    Patient is aware this is a billable service.     Subjective  Miguelina Russo is a 58 y.o. female with history of bipolar  disorder and anxiety.      STEWART Mcintyre was seen for follow-up and for medication management.  At her last visit, caplyta was started however unable to obtain due t high co-pay. Lamictal was increased to 250 mg total per day and abilify was continued with cymbalta and gabapentin.  Had EGD and has appt upcoming with surgeon due to cholecystectomy.    States that over the past couple of weeks she has been feeling tired.  States that she has been napping off and on during the day.  She is not sleeping through the night as a result.  States that she is not feeling really depressed or sad but just physically tired.  States that she is not motivated and does not want to do anything.  Spending most of her time either in bed or on the couch.  Also continues with frequent anxiety and worry.  Question if increasing dose and lamotrigine is causing this that she is taking 50 mg the morning and 200 mg at night.  No other changes were made.  Good supportive relationship with her spouse.    Past Medical History:   Diagnosis Date    Anxiety     Depression     GERD (gastroesophageal reflux disease)     Ovarian cyst     Urinary incontinence        Past Surgical History:   Procedure Laterality Date    BACK SURGERY      fusion    CERVICAL SPINE SURGERY      CYSTOSCOPY  03/13/2018         OOPHORECTOMY Right     [place] LAPAROSCOPY SLING OPERATION STRESS INCONT N/A 4/24/2018    Procedure: INSERTION PUBOVAGINAL SLING (FEMALE) Trans obturator mid urethral sling insertion, CYSTOSCOPY;  Surgeon: James Mesa MD;  Location: [facility];  Service: Urology       Current Outpatient Medications   Medication Sig Dispense Refill    ARIPiprazole (ABILIFY) 10 mg tablet Take 1 tablet (10 mg total) by mouth daily 90 tablet 1    Calcium Carbonate (CALTRATE 600 PO) Take by mouth      DULoxetine (CYMBALTA) 60 mg delayed release capsule Take 1 capsule (60 mg total) by mouth daily 90 capsule 1    gabapentin (NEURONTIN) 400 mg capsule Take 2 capsules  (800 mg total) by mouth 3 (three) times a day 540 capsule 1    lamoTRIgine (LaMICtal) 25 mg tablet TAKE TWO TABLETS BY MOUTH IN THE AM AND EIGHT TABLETS BY MOUTH AT BEDTIME 900 tablet 1    omeprazole (PriLOSEC) 40 MG capsule Take 1 capsule (40 mg total) by mouth daily before breakfast 90 capsule 1    sucralfate (CARAFATE) 1 g/10 mL suspension Take 10 mL (1 g total) by mouth 4 (four) times a day 1200 mL 1    traZODone (DESYREL) 50 mg tablet TAKE THREE TABLETS BY MOUTH AT BEDTIME AS NEEDED 270 tablet 1     No current facility-administered medications for this visit.        Allergies   Allergen Reactions    Tylenol With Codeine #3 [Acetaminophen-Codeine] Hyperactivity    Azithromycin Rash    Erythromycin Rash       Review of Systems  As noted in HPI  Video Exam    There were no vitals filed for this visit.    Physical Exam   Mental status exam:   Speech is clear, coherent, normal rate and volume  Adequate hygiene, good eye contact  Affect is dysthymic, constricted  Mood is congruent  Linear and coherent thought process  No suicidal or homicidal ideation  No psychotic signs or symptoms noted, no hallucinations and no delusions were voiced  cognition is intact  Insight and judgment intact      Medications the treatment plan as follows:    Abilify 10 mg daily discontinue    Start Vraylar 1.5 mg daily    She has been on Seroquel, Zyprexa, Abilify in the past with subtherapeutic effects or side effects, discussed possible need for prior authorization and she will continue with Abilify until Vraylar is started    Gabapentin 800 mg 3 times daily    Cymbalta 60 mg daily    Lamotrigine 250 mg total per day, decreased back to 200 mg at bedtime only to increase lethargy with higher dosage    Trazodone 50 mg 2-3 at bedtime as needed  We will follow-up in 2 weeks and she will call sooner if any questions or concerns    She will follow-up on Wednesday, April 10, 2024 at 330 for a virtual appointment    Visit Time    Visit Start  Time: 927  Visit Stop Time: 947  Total Visit Duration:  20 minutes with patient and additional time reviewing chart, charting, medications

## 2024-03-30 DIAGNOSIS — F41.9 ANXIETY: ICD-10-CM

## 2024-03-30 DIAGNOSIS — M79.7 FIBROMYALGIA: ICD-10-CM

## 2024-04-01 ENCOUNTER — OFFICE VISIT (OUTPATIENT)
Dept: GASTROENTEROLOGY | Facility: CLINIC | Age: 59
End: 2024-04-01
Payer: COMMERCIAL

## 2024-04-01 VITALS
DIASTOLIC BLOOD PRESSURE: 74 MMHG | SYSTOLIC BLOOD PRESSURE: 107 MMHG | OXYGEN SATURATION: 96 % | TEMPERATURE: 96.5 F | BODY MASS INDEX: 27.21 KG/M2 | HEIGHT: 67 IN | HEART RATE: 72 BPM | WEIGHT: 173.4 LBS

## 2024-04-01 DIAGNOSIS — K21.9 GASTROESOPHAGEAL REFLUX DISEASE WITHOUT ESOPHAGITIS: Primary | ICD-10-CM

## 2024-04-01 DIAGNOSIS — R10.11 RUQ PAIN: ICD-10-CM

## 2024-04-01 DIAGNOSIS — D18.03 HEMANGIOMA OF LIVER: ICD-10-CM

## 2024-04-01 PROCEDURE — 99214 OFFICE O/P EST MOD 30 MIN: CPT | Performed by: DIETITIAN, REGISTERED

## 2024-04-01 RX ORDER — OMEPRAZOLE 20 MG/1
20 CAPSULE, DELAYED RELEASE ORAL DAILY
Qty: 90 CAPSULE | Refills: 3 | Status: SHIPPED | OUTPATIENT
Start: 2024-04-01

## 2024-04-01 RX ORDER — DULOXETIN HYDROCHLORIDE 60 MG/1
60 CAPSULE, DELAYED RELEASE ORAL DAILY
Qty: 90 CAPSULE | Refills: 1 | Status: SHIPPED | OUTPATIENT
Start: 2024-04-01

## 2024-04-01 NOTE — PROGRESS NOTES
[facility] - Outpatient Follow-up Note  Miguelina Russo 58 y.o. female MRN: [id number]  Encounter: [id number]          ASSESSMENT AND PLAN:    1.  RUQ pain  2.  GERD without esophagitis  3.  Hemangioma of the liver  Patient reports worsening episodic RUQ pain, sometimes in the epigastrium, which feels stabbing/sharp.  Her omeprazole was increased from 20 mg to 40 mg daily and she was started on sucralfate 4 times daily, with no improvement in her symptoms with these medication adjustments.  She notes a chronic history of intermittent heartburn which is well-controlled on omeprazole 20 mg daily at baseline.  She typically has maybe 2-3 episodes of heartburn a year.  Denies any nausea, vomiting, reflux, black or bloody stools, rectal bleeding.  In our first visit, she did endorse esophageal dysphagia with all solid foods and pills.  RUQ US 3/1/2024 with multiple large mobile gallstones, the largest measuring 1.7 cm with no gallbladder wall thickening, CBD measures 7 mm with no choledocholithiasis visualized.  Also noted hepatomegaly and 9 x 10 mm echogenic lesion, likely hemangioma, can consider MRI abdomen with contrast for confirmation.  EGD 3/22/2024 with normal esophagus, 3 cm Hill grade I hiatal hernia, s/p esophageal dilation to 20 mm, gastritis.  Pathology showed benign esophageal mucosa with mild reactive changes, gastric mucosa with mild chronic inactive gastritis negative for H pylori.  Patient has had resolution of dysphagia to foods since her dilation.     -Episodic RUQ sharp/stabbing pain seems consistent with symptomatic cholelithiasis.  Recommend keeping appointment with general surgery on 4/8/2024.  -Schedule MRI abdomen with contrast for further evaluation of likely liver hemangioma.  -Decrease omeprazole back down to 20 mg daily.         Follow up 3 months, can call for sooner visit if needed.    __________________________________________________________    SUBJECTIVE:   Miguelina Russo is a 58-year-old female with history of CKD stage 3a, fibromyalgia, hyperlipidemia, bipolar 1 disorder who presents for follow up for epigastric/RUQ pain.   Last office visit 3/15/2024.  EGD 3/22/2024 with normal esophagus, 3 cm Hill grade I hiatal hernia, s/p esophageal dilation to 20 mm, gastritis.  Pathology showed benign esophageal mucosa with mild reactive changes, gastric mucosa with mild chronic inactive gastritis negative for H pylori.    Patient is accompanied by her , Thierry, who helps to provide history.  She reports esophageal dysphagia to foods has completely resolved, but she still has to go very slowly with her pills.  She takes 1 pill at a time and drinks lots of water.  She reports RUQ pain still comes/goes, often achy but sometimes sharp/stabbing.  This tends to happen more when she has an empty stomach.  She denies any worsening GERD symptoms, nocturnal awakenings, changes in bowel habits, constipation/diarrhea, black/bloody stools, rectal bleeding.        REVIEW OF SYSTEMS:  10 point ROS reviewed and negative, except as above      Historical Information   Past Medical History:   Diagnosis Date    Anxiety     Depression     GERD (gastroesophageal reflux disease)     Ovarian cyst     Urinary incontinence      Past Surgical History:   Procedure Laterality Date    BACK SURGERY      fusion    CERVICAL SPINE SURGERY      CYSTOSCOPY  03/13/2018         OOPHORECTOMY Right     [place] LAPAROSCOPY SLING OPERATION STRESS INCONT N/A 4/24/2018    Procedure: INSERTION PUBOVAGINAL SLING (FEMALE) Trans obturator mid urethral sling insertion, CYSTOSCOPY;  Surgeon: James Mesa MD;  Location: [facility];  Service: Urology     Social History   Social History     Substance and Sexual Activity   Alcohol Use Not Currently    Comment: social      Social History     Substance and Sexual Activity   Drug Use No     Social History     Tobacco Use   Smoking Status Every Day    Current  packs/day: 1.00    Types: Cigarettes   Smokeless Tobacco Never     Family History   Problem Relation Age of Onset    No Known Problems Mother     No Known Problems Father     No Known Problems Sister     No Known Problems Sister     No Known Problems Daughter     No Known Problems Daughter     No Known Problems Daughter     No Known Problems Maternal Grandmother     No Known Problems Maternal Grandfather     No Known Problems Paternal Grandmother     No Known Problems Paternal Grandfather     No Known Problems Paternal Aunt     Breast cancer Neg Hx        Meds/Allergies       Current Outpatient Medications:     Calcium Carbonate (CALTRATE 600 PO)    DULoxetine (CYMBALTA) 60 mg delayed release capsule    gabapentin (NEURONTIN) 400 mg capsule    lamoTRIgine (LaMICtal) 25 mg tablet    lurasidone (LATUDA) 20 mg tablet    omeprazole (PriLOSEC) 40 MG capsule    sucralfate (CARAFATE) 1 g/10 mL suspension    traZODone (DESYREL) 50 mg tablet    Allergies   Allergen Reactions    Tylenol With Codeine #3 [Acetaminophen-Codeine] Hyperactivity    Azithromycin Rash    Erythromycin Rash           Objective     Wt Readings from Last 3 Encounters:   03/22/24 78 kg (172 lb)   03/15/24 79.9 kg (176 lb 3.2 oz)   02/23/24 78.5 kg (173 lb)     Temp Readings from Last 3 Encounters:   03/22/24 (!) 96.6 °F (35.9 °C) (Temporal)   03/15/24 (!) 96.7 °F (35.9 °C) (Tympanic)   02/23/24 97.5 °F (36.4 °C) (Tympanic)     BP Readings from Last 3 Encounters:   03/22/24 117/71   03/15/24 120/83   02/23/24 124/72     Pulse Readings from Last 3 Encounters:   03/22/24 76   03/15/24 95   02/23/24 74          PHYSICAL EXAM:      Physical Exam  Vitals reviewed.   Constitutional:       General: She is not in acute distress.     Appearance: She is well-developed.   HENT:      Head: Normocephalic and atraumatic.   Eyes:      Conjunctiva/sclera: Conjunctivae normal.   Cardiovascular:      Rate and Rhythm: Normal rate and regular rhythm.      Heart sounds: No  murmur heard.  Pulmonary:      Effort: Pulmonary effort is normal. No respiratory distress.      Breath sounds: Normal breath sounds.   Abdominal:      General: Bowel sounds are normal. There is no distension.      Palpations: Abdomen is soft.      Tenderness: There is no abdominal tenderness. There is no guarding.   Musculoskeletal:         General: No swelling.      Cervical back: Neck supple.   Skin:     General: Skin is warm and dry.   Neurological:      Mental Status: She is alert.   Psychiatric:         Mood and Affect: Mood normal.          Lab Results:   No visits with results within 1 Day(s) from this visit.   Latest known visit with results is:   Hospital Outpatient Visit on 03/22/2024   Component Date Value    Case Report 03/22/2024                      Value:Surgical Pathology Report                         Case: [id number]                                  Authorizing Provider:  Linus Finn MD       Collected:           03/22/2024 1118              Ordering Location:     [facility]        Received:            03/22/2024 [address] [place] [address] Endoscopy                                                     Pathologist:           Angi De La Cruz MD                                                       Specimens:   A) - Stomach, gastric bx's r/o H pylori                                                             B) - Esophagus, bx's r/o EOE                                                               Final Diagnosis 03/22/2024                      Value:This result contains rich text formatting which cannot be displayed here.    Additional Information 03/22/2024                      Value:This result contains rich text formatting which cannot be displayed here.    Gross Description 03/22/2024                      Value:This result contains rich text formatting which cannot be displayed here.       Lab Results   Component Value Date    WBC 5.79 03/09/2024     HGB 14.7 03/09/2024    HCT 44.7 03/09/2024    MCV 99 (H) 03/09/2024     03/09/2024       Lab Results   Component Value Date    SODIUM 145 03/09/2024    K 4.8 03/09/2024     03/09/2024    CO2 31 03/09/2024    AGAP 6 03/09/2024    BUN 15 03/09/2024    CREATININE 0.87 03/09/2024    GLUC 95 10/19/2022    GLUF 97 03/09/2024    CALCIUM 9.4 03/09/2024    AST 14 03/09/2024    ALT 14 03/09/2024    ALKPHOS 64 03/09/2024    TP 6.4 03/09/2024    TBILI 0.51 03/09/2024    EGFR 72 03/09/2024         Radiology Results:   EGD    Result Date: 3/22/2024  Narrative: Table formatting from the original result was not included. [facility] [address] [facility] [address] [phone] DATE OF SERVICE: 3/22/24 PHYSICIAN(S): Attending: Linus Finn MD Fellow: No Staff Documented INDICATION: Esophageal dysphagia, Epigastric pain, RUQ pain POST-OP DIAGNOSIS: See the impression below. PREPROCEDURE: Informed consent was obtained for the procedure, including sedation.  Risks of perforation, hemorrhage, adverse drug reaction and aspiration were discussed. The patient was placed in the left lateral decubitus position. Patient was explained about the risks and benefits of the procedure. Risks including but not limited to bleeding, infection, and perforation were explained in detail. Also explained about less than 100% sensitivity with the exam and other alternatives. PROCEDURE: EGD DETAILS OF PROCEDURE: Patient was taken to the procedure room where a time out was performed to confirm correct patient and correct procedure. The patient underwent monitored anesthesia care, which was administered by an anesthesia professional. The patient's blood pressure, heart rate, level of consciousness, respirations, oxygen, ECG and ETCO2 were monitored throughout the procedure. The scope was introduced through the mouth and advanced to the second part of the duodenum. Retroflexion was performed in the  fundus. The patient experienced no blood loss. The procedure was not difficult. The patient tolerated the procedure well. There were no apparent adverse events. ANESTHESIA INFORMATION: ASA: II Anesthesia Type: IV Sedation with Anesthesia MEDICATIONS: sodium chloride 0.9 % infusion 800 mL*  *From user-documented volume (Totals for administrations occurring from 1109 to 1125 on 03/22/24) FINDINGS: The esophagus appeared normal. 3 cm hiatal hernia - GE junction 37 cm from the incisors, diaphragmatic impression 40 cm from the incisors:  Hill classification: Grade I Dilated in the esophagus with balloon dilator. TTS balloon was inflated to 20 mm in the stomach and pulled upward with the endoscope into the esophagus. Resistance was met in the lower esophagus, so the balloon was deflated, then reinflated at the appropriate position for empiric dilation. Afterwards, the 20 mm balloon could be pulled through the esophagus with minimal resistance. Moderate erythematous mucosa in the body of the stomach and antrum; performed cold forceps biopsy to rule out H. pylori The duodenum appeared normal. SPECIMENS: ID Type Source Tests Collected by Time Destination 1 : gastric bx's r/o H pylori Tissue Stomach TISSUE EXAM Linus Finn MD 3/22/2024 11:18 AM  2 : bx's r/o EOE Tissue Esophagus TISSUE EXAM Linus Finn MD 3/22/2024 11:24 AM      Impression: The esophagus appeared normal. 3 cm hiatal hernia Dilated in the esophagus with balloon dilator Moderate erythematous mucosa in the body of the stomach and antrum; performed cold forceps biopsy to rule out H. pylori The duodenum appeared normal. RECOMMENDATION:  Await pathology results  Resume omeprazole 40 mg daily. Cut down on daily NSAID use.    Linus Finn MD

## 2024-04-03 NOTE — PROGRESS NOTES
Chief Complaint   Patient presents with   • Cholelithiasis       Miguelina Russo is a 58 y.o.female who is here for :    Chief Complaint   Patient presents with   • Cholelithiasis   Cholelithiasis with no signs of acute cholecystitis.  2. Echogenic lesion in the right lobe of the liver likely hemangiom    Currently:       Assessment/Plan:   Miguelina Russo is a 58 y.o.female who is here for possible Gallbladder disease.         Upon evaluation today patient has: Bilary colic and Chronic Cholecystitis    .     Plan: Laparoscopic Cholecystectomy with possible Intraoperative Cholangiogram  Possible Robotic assisted      2.  Screening colonoscopy.  Asymptomatic no family history.    Post Op Pain Management: Norco      Ultrasound findings: Multiple large gallstones measuring up to 1.7 cm, 18 cm liver       Body mass index is 27.25 kg/m².     Preoperative Clearance: None    In preparation for this visit all relevant and known prior office notes, prior consultations, emergency room visits, blood work results, and imaging studies were personally reviewed.  A total of  50 minutes was spent reviewing all of this information,caring for this patient, providing differential diagnosis, instructions for management, counseling and coordination of care.  This also includes planning surgical intervention where indicated.      Images:       Patient understands hernia occurrence or re-occurrence risk is higher in a diabetic, tobacco user, with elevated BMIs.     Patient was discussed and asked her medication list.    Discussion was held regarding anticoagulation, aspirin and Motrin use prior to surgery.  Most anticoagulation needs to stop 7 to 10 days prior to surgery.  Some can be stopped at 48 hours.  Patient has had all questions answered by the physician or physician representative regarding anticoagulation.  Patient is aware that they need to stop their anticoagulation preoperatively.    Discussion was held regarding weight  loss medication and diabetic medication with that also is used for weight loss.  These medications have significant perioperative risk.  He has medications need to be stopped 7 to 10 days prior to surgery.  Patient understands and is aware they need to stop these type of medications preoperatively.    Patient has been given a list of anticoagulation and/or weight loss/diabetes medications that would be affected by this and they have confirmed the are either not on these drugs or have understood their directions to stop them at least 1 week prior to surgery or as instructed.    Most herbal medication should be discontinued a week to 10 days prior to surgery.    Diabetic medication such as insulin should be discussed with her primary care physician or the physician that ordered the insulin or similar medications.  In general patients usually half their diabetic insulin the morning of the surgery but again this should be discussed with the physician.    Patient understands and agrees to this aforementioned protocol.          ____________________________________________________    HPI:  Miguelina Russo is a 58 y.o.female who was referred for evaluation of The primary encounter diagnosis was Cholelithiasis. Diagnoses of Gallstones, Encounter for screening colonoscopy, Stage 3a chronic kidney disease (HCC), Major depression, recurrent (HCC), and Bipolar 1 disorder (HCC) were also pertinent to this visit.      Abdominal pain Location: in the RUQ without radiation, which is Intermittent  and over 3 months     no nausea and no vomiting,     regular bowel movement.     Duration of pain or symptoms: over 1 month  History of depression and bipolar.  No significant other medical history than this.  Should be cleared for surgery.  No previous colonoscopy, no family history and no blood in her stools    Prior Colonoscopy : None     Prior Abdominal Surgery: Pfannenstiel incision for previous ovarian    Anticoagulation:  "none    A1c:     Smoking Status: Non-smoker     Imaging:   No orders to display           LABS:    Lab Results   Component Value Date    K 4.8 03/09/2024     03/09/2024    CO2 31 03/09/2024    BUN 15 03/09/2024    CREATININE 0.87 03/09/2024    GLUF 97 03/09/2024    CALCIUM 9.4 03/09/2024    AST 14 03/09/2024    ALT 14 03/09/2024    ALKPHOS 64 03/09/2024    EGFR 72 03/09/2024       Lab Results   Component Value Date    WBC 5.79 03/09/2024    HGB 14.7 03/09/2024    HCT 44.7 03/09/2024    MCV 99 (H) 03/09/2024     03/09/2024     No results found for: \"INR\", \"PROTIME\"  Lab Results   Component Value Date    HGBA1C 5.5 03/09/2024           ROS:  General ROS: negative for - chills, fatigue, fever or night sweats, weight loss  Respiratory ROS: no cough, shortness of breath, or wheezing  Cardiovascular ROS: no chest pain or dyspnea on exertion  Genito-Urinary ROS: no dysuria, trouble voiding, or hematuria  Musculoskeletal ROS: negative for - gait disturbance, joint pain or muscle pain  Neurological ROS: no TIA or stroke symptoms  Gastrointestinal ROS: See HPI   GI ROS: see HPI  Skin ROS: no new rashes or lesions   Lymphatic ROS: no new adenopathy noted by pt.   GYN ROS: see HPI, no new GYN history or bleeding noted  Psy ROS: no new mental or behavioral disturbances       Patient Active Problem List   Diagnosis   • Stress incontinence of urine   • Bipolar 1 disorder (HCC)   • Major depression, recurrent (HCC)   • Fibromyalgia   • Cellulitis of right lower extremity   • Chronic bilateral low back pain without sciatica   • MDD (major depressive disorder), recurrent severe, without psychosis ([facility])   • Panic disorder   • Other hyperlipidemia   • Asymptomatic menopause   • Vitamin D insufficiency   • Stage 3a chronic kidney disease (HCC)   • Hyperglycemia   • Epigastric pain   • RUQ pain         Allergies:  Tylenol with codeine #3 [acetaminophen-codeine], Azithromycin, and Erythromycin      Current Outpatient " Medications:   •  Calcium Carbonate (CALTRATE 600 PO), Take by mouth, Disp: , Rfl:   •  DULoxetine (CYMBALTA) 60 mg delayed release capsule, TAKE ONE CAPSULE BY MOUTH EVERY DAY, Disp: 90 capsule, Rfl: 1  •  gabapentin (NEURONTIN) 400 mg capsule, Take 2 capsules (800 mg total) by mouth 3 (three) times a day, Disp: 540 capsule, Rfl: 1  •  lamoTRIgine (LaMICtal) 25 mg tablet, TAKE EIGHT TABLETS BY MOUTH AT BEDTIME, Disp: , Rfl:   •  lurasidone (LATUDA) 20 mg tablet, Take 1 tablet (20 mg total) by mouth daily with dinner, Disp: 30 tablet, Rfl: 1  •  omeprazole (PriLOSEC) 20 mg delayed release capsule, Take 1 capsule (20 mg total) by mouth daily, Disp: 90 capsule, Rfl: 3  •  traZODone (DESYREL) 50 mg tablet, TAKE THREE TABLETS BY MOUTH AT BEDTIME AS NEEDED, Disp: 270 tablet, Rfl: 1    Past Medical History:   Diagnosis Date   • Anxiety    • Depression    • GERD (gastroesophageal reflux disease)    • Ovarian cyst    • Urinary incontinence        Past Surgical History:   Procedure Laterality Date   • BACK SURGERY      fusion   • CERVICAL SPINE SURGERY     • CYSTOSCOPY  03/13/2018        • OOPHORECTOMY Right    • [place] LAPAROSCOPY SLING OPERATION STRESS INCONT N/A 4/24/2018    Procedure: INSERTION PUBOVAGINAL SLING (FEMALE) Trans obturator mid urethral sling insertion, CYSTOSCOPY;  Surgeon: James Mesa MD;  Location: [facility] OR;  Service: Urology       Family History   Problem Relation Age of Onset   • No Known Problems Mother    • No Known Problems Father    • No Known Problems Sister    • No Known Problems Sister    • No Known Problems Daughter    • No Known Problems Daughter    • No Known Problems Daughter    • No Known Problems Maternal Grandmother    • No Known Problems Maternal Grandfather    • No Known Problems Paternal Grandmother    • No Known Problems Paternal Grandfather    • No Known Problems Paternal Aunt    • Breast cancer Neg Hx         reports that she has been smoking cigarettes. She has never  been exposed to tobacco smoke. She has never used smokeless tobacco. She reports that she does not currently use alcohol. She reports that she does not use drugs.      Exam:   Vitals:    04/08/24 0827   BP: 112/80   Pulse: 55   Resp: 16   Temp: (!) 96.4 °F (35.8 °C)   SpO2: 99%       PHYSICAL EXAM  General Appearance:    Alert, cooperative, no distress,    Head:    Normocephalic without obvious abnormality   Eyes:    PERRL, conjunctiva/corneas clear,       Neck:   Supple, no adenopathy, no JVD   Back:     Symmetric, no spinal or CVA tenderness   Lungs:     Clear to auscultation bilaterally, no wheezing or rhonchi   Heart:    Regular rate and rhythm, S1 and S2 normal, no murmur   Abdomen:     abdomen is soft without significant tenderness, masses, organomegaly or guarding Bowel sounds are normal.     Extremities:   Extremities normal. No clubbing, cyanosis or edema   Psych:   Normal Affect, AOx3.    Neurologic:  Skin:   CNII-XII intact. Strength symmetric, speech intact    Warm, dry, intact, no visible rashes or lesions      Informed consent for procedure was personally discussed, reviewed, and signed by Dr. Cisneros. Discussion by Dr. Cisneros was carried out regarding risks, benefits, and alternatives with the patient. Risks include but are not limited to:  bleeding, infection, and delayed wound healing or an open wound, pulmonary embolus, leaks from bowel or bile ducts or other viscus, transfusions, death.  Discussed in further detail the more common complications and their rates of occurrence.   was used if necessary.  Patient expressed understanding of the issues discussed and wished/consented to proceed.  All questions were answered by Dr. Cisneros.    Discussion performed between patient and the provider signing below.     Signature:   Mariya Cisneros MD    Date: 4/8/2024 Time: 9:37 AM                          Some portions of this record may have been generated with voice recognition software.  "There may be translation, syntax,  or grammatical errors. Occasional wrong word or \"sound-a-like\" substitutions may have occurred due to the inherent limitations of the voice recognition software. Read the chart carefully and recognize, using context, where substitutions may have occurred. If you have any questions, please contact the dictating provider for clarification or correction, as needed. This encounter has been coded by a non-certified coder.   "

## 2024-04-08 ENCOUNTER — CONSULT (OUTPATIENT)
Dept: SURGERY | Facility: CLINIC | Age: 59
End: 2024-04-08
Payer: COMMERCIAL

## 2024-04-08 VITALS
WEIGHT: 174 LBS | RESPIRATION RATE: 16 BRPM | SYSTOLIC BLOOD PRESSURE: 112 MMHG | DIASTOLIC BLOOD PRESSURE: 80 MMHG | OXYGEN SATURATION: 99 % | HEIGHT: 67 IN | HEART RATE: 55 BPM | BODY MASS INDEX: 27.31 KG/M2 | TEMPERATURE: 96.4 F

## 2024-04-08 DIAGNOSIS — Z12.11 ENCOUNTER FOR SCREENING COLONOSCOPY: ICD-10-CM

## 2024-04-08 DIAGNOSIS — F33.9 MAJOR DEPRESSION, RECURRENT (HCC): ICD-10-CM

## 2024-04-08 DIAGNOSIS — F31.9 BIPOLAR 1 DISORDER (HCC): ICD-10-CM

## 2024-04-08 DIAGNOSIS — N18.31 STAGE 3A CHRONIC KIDNEY DISEASE (HCC): ICD-10-CM

## 2024-04-08 DIAGNOSIS — K80.20 CHOLELITHIASIS: Primary | ICD-10-CM

## 2024-04-08 DIAGNOSIS — K80.20 GALLSTONES: ICD-10-CM

## 2024-04-08 PROCEDURE — 99204 OFFICE O/P NEW MOD 45 MIN: CPT | Performed by: SURGERY

## 2024-04-10 ENCOUNTER — TELEMEDICINE (OUTPATIENT)
Dept: PSYCHIATRY | Facility: CLINIC | Age: 59
End: 2024-04-10

## 2024-04-10 DIAGNOSIS — F31.62 BIPOLAR 1 DISORDER, MIXED, MODERATE (HCC): Primary | ICD-10-CM

## 2024-04-10 DIAGNOSIS — Z51.81 MEDICATION MONITORING ENCOUNTER: ICD-10-CM

## 2024-04-10 DIAGNOSIS — M79.7 FIBROMYALGIA: ICD-10-CM

## 2024-04-10 DIAGNOSIS — F41.9 ANXIETY: ICD-10-CM

## 2024-04-10 RX ORDER — LURASIDONE HYDROCHLORIDE 40 MG/1
40 TABLET, FILM COATED ORAL
Qty: 30 TABLET | Refills: 1 | Status: SHIPPED | OUTPATIENT
Start: 2024-04-10

## 2024-04-10 NOTE — BH TREATMENT PLAN
TREATMENT PLAN (Medication Management Only)        [facility]    Name and Date of Birth:  Miguelina Russo 58 y.o. 1965  Date of Treatment Plan: April 10, 2024  Diagnosis/Diagnoses:    1. Bipolar 1 disorder, mixed, moderate (HCC)    2. Medication monitoring encounter    3. Anxiety    4. Fibromyalgia      Strengths/Personal Resources for Self-Care: supportive family, taking medications as prescribed.  Area/Areas of need (in own words):  Poor motivation  1. Long Term Goal: improve control of mood stability.  Target Date:6 months - 10/10/2024  Person/Persons responsible for completion of goal: Miguelina  2.  Short Term Objective (s) - How will we reach this goal?:   A. Provider new recommended medication/dosage changes and/or continue medication(s):  Increase Latuda to 40 mg daily, continue other medications .  B. N/A.  C. N/A.  Target Date:6 months - 10/10/2024  Person/Persons Responsible for Completion of Goal: Miguelina  Progress Towards Goals: continuing treatment  Treatment Modality: medication management every 1 month  Review due 180 days from date of this plan: 6 months - 10/10/2024  Expected length of service: maintenance  My Physician/PA/NP and I have developed this plan together and I agree to work on the goals and objectives. I understand the treatment goals that were developed for my treatment.

## 2024-04-10 NOTE — PSYCH
This note was not shared with the patient due to reasonable likelihood of causing patient harm    Virtual Regular Visit    Verification of patient location:    Patient is located at Home in the following state in which I hold an active license [place]      Assessment/Plan:    Problem List Items Addressed This Visit          Surgery/Wound/Pain    Fibromyalgia     Other Visit Diagnoses       Bipolar 1 disorder, mixed, moderate (HCC)    -  Primary    Relevant Medications    lurasidone (LATUDA) 40 mg tablet    Medication monitoring encounter        Anxiety                Goals addressed in session: Goal 1          Reason for visit is   Chief Complaint   Patient presents with    Medication Management    Follow-up    Virtual Regular Visit          Encounter provider Clementine Campuzano PA-C    Provider located at [facility]  [address] [address]      Recent Visits  No visits were found meeting these conditions.  Showing recent visits within past 7 days and meeting all other requirements  Today's Visits  Date Type Provider Dept   04/10/24 Telemedicine Clementine Campuzano PA-C  [facility]   Showing today's visits and meeting all other requirements  Future Appointments  No visits were found meeting these conditions.  Showing future appointments within next 150 days and meeting all other requirements       The patient was identified by name and date of birth. Miguelina Russo was informed that this is a telemedicine visit and that the visit is being conducted throughthe Epic Embedded platform. She agrees to proceed..  My office door was closed. No one else was in the room.  She acknowledged consent and understanding of privacy and security of the video platform. The patient has agreed to participate and understands they can discontinue the visit at any time.    Patient is aware this is a billable service.     Subjective  Miguelina HAQ  Karan is a 58 y.o. female with history of bipolar disorder and anxiety.      STEWART Mcintyre was seen for follow up bipolar disorder and for medication management.  At her last visit, Vraylar was prescribed but unable to obtain due to insurance constraints.  She was started on lurasidone 20 mg daily with dinner.  States that she has not noted much of a difference.  States that she has not had any adverse effects though.  Taking 1 less nap per day so slight improvement may be noted.  No suicidal or homicidal ideation.  Physically states that she continues to have pain bilateral in her thumbs.  She has been receiving injections but now is able to only receive them once every 6 months.  States that rheumatology suggested that she see orthopedics for physical therapy.  She was also seen by general surgery for cholelithiasis and will be having cholecystectomy in July.  States that first they want to complete an MRI due to a liver hemangioma.  Good supportive relationship with her significant other.  No new medications or medical issues noted.  Medication list reviewed and updated.  Past Medical History:   Diagnosis Date    Anxiety     Depression     GERD (gastroesophageal reflux disease)     Ovarian cyst     Urinary incontinence        Past Surgical History:   Procedure Laterality Date    BACK SURGERY      fusion    CERVICAL SPINE SURGERY      CYSTOSCOPY  03/13/2018         OOPHORECTOMY Right     OR LAPAROSCOPY SLING OPERATION STRESS INCONT N/A 4/24/2018    Procedure: INSERTION PUBOVAGINAL SLING (FEMALE) Trans obturator mid urethral sling insertion, CYSTOSCOPY;  Surgeon: James Mesa MD;  Location[facility];  Service: Urology       Current Outpatient Medications   Medication Sig Dispense Refill    lurasidone (LATUDA) 40 mg tablet Take 1 tablet (40 mg total) by mouth daily with dinner 30 tablet 1    Calcium Carbonate (CALTRATE 600 PO) Take by mouth      DULoxetine (CYMBALTA) 60 mg delayed release capsule  TAKE ONE CAPSULE BY MOUTH EVERY DAY 90 capsule 1    gabapentin (NEURONTIN) 400 mg capsule Take 2 capsules (800 mg total) by mouth 3 (three) times a day 540 capsule 1    lamoTRIgine (LaMICtal) 25 mg tablet TAKE EIGHT TABLETS BY MOUTH AT BEDTIME      omeprazole (PriLOSEC) 20 mg delayed release capsule Take 1 capsule (20 mg total) by mouth daily 90 capsule 3    traZODone (DESYREL) 50 mg tablet TAKE THREE TABLETS BY MOUTH AT BEDTIME AS NEEDED 270 tablet 1     No current facility-administered medications for this visit.        Allergies   Allergen Reactions    Tylenol With Codeine #3 [Acetaminophen-Codeine] Hyperactivity    Azithromycin Rash    Erythromycin Rash       Review of Systems  As noted in HPI  Video Exam    There were no vitals filed for this visit.    Physical Exam   MSE:  Patient with clear, coherent speech, normal rate and volume  Adequate hygiene, good eye contact  Affect is dysthymic, constricted  Mood is congruent  Linear and coherent thought process  No suicidal or homicidal ideation  No psychotic signs or symptoms noted, no hallucinations and no delusions were voiced    cognition is intact  Insight and judgment is intact      Medications and treatment plan as follows:    Increase latuda to 40 mg daily with dinner  Continue lamotrigine 200 mg total per day  Continue Cymbalta 60 mg daily for fibromyalgia  Continue gabapentin 800 mg 3 times daily  Continue trazodone 50 mg 2-3 at bedtime as needed  We will follow-up in 1 month  She will follow-up on Monday, May 6, 2024 at 9 AM for virtual appointment and call me sooner if any questions or concerns    Visit Time    Visit Start Time: 330  Visit Stop Time: 349  Total Visit Duration:  19 minutes

## 2024-04-12 ENCOUNTER — TELEPHONE (OUTPATIENT)
Dept: PSYCHIATRY | Facility: CLINIC | Age: 59
End: 2024-04-12

## 2024-04-12 DIAGNOSIS — F31.81 BIPOLAR 2 DISORDER (HCC): ICD-10-CM

## 2024-04-12 RX ORDER — ROPINIROLE 1 MG/1
1 TABLET, FILM COATED ORAL
Qty: 30 TABLET | Refills: 3 | Status: SHIPPED | OUTPATIENT
Start: 2024-04-12

## 2024-04-12 NOTE — TELEPHONE ENCOUNTER
Returned Miguelina's call and informed her that Kaitlin said she can continue taking the Requip and she can send a new script if she wants.  Miguelina will stay on Latuda at 40 MG and is requesting that script for Requip be sent to her pharmacy.     Will refer to Clementine Campuzano for review.

## 2024-04-12 NOTE — TELEPHONE ENCOUNTER
Returned Miguelina's call.  Instructed her to decrease her Latuda back down to 20 MG to see if the restless leg improves.  Miguelina said this was even happening when she was on 20 MG.  States she don't know if she should have done this, but she was desperate last night so she took a Ropinirole that she had left and that helped.  She is requesting further recommendation.    Will refer to Clementine Campuzano for review.

## 2024-04-12 NOTE — TELEPHONE ENCOUNTER
Patient called to notify provider that she's getting restless legs from Latuda. Patient would like a call to discuss. [phone]

## 2024-04-16 ENCOUNTER — TELEPHONE (OUTPATIENT)
Dept: PSYCHIATRY | Facility: CLINIC | Age: 59
End: 2024-04-16

## 2024-04-16 NOTE — TELEPHONE ENCOUNTER
Followed up on Miguelina's phone call.  Informed her that Kaitlin is aware she stopped the Latuda and wants her to continue taking other medications as prescribed.  Instructed her to call the office if she has any further concerns prior to 5/6 office appointment.

## 2024-04-16 NOTE — TELEPHONE ENCOUNTER
Patient called to notify provider that as of last night she stopped taking  Latuda because it was keeping her up at night.

## 2024-04-22 ENCOUNTER — TELEPHONE (OUTPATIENT)
Dept: PSYCHIATRY | Facility: CLINIC | Age: 59
End: 2024-04-22

## 2024-04-22 DIAGNOSIS — F31.62 BIPOLAR 1 DISORDER, MIXED, MODERATE (HCC): Primary | ICD-10-CM

## 2024-04-22 RX ORDER — ARIPIPRAZOLE 2 MG/1
2 TABLET ORAL DAILY
Qty: 14 TABLET | Refills: 0 | Status: SHIPPED | OUTPATIENT
Start: 2024-04-22 | End: 2024-05-06 | Stop reason: SDUPTHER

## 2024-04-22 NOTE — TELEPHONE ENCOUNTER
Returned Miguelina's call.  She stopped the Latuda, but is still not feeling well.  She has been dizzy for the past week and has not been sleeping.  She would like to go back on the Abilify.  She was on 10 MG.      Will refer to Dr Rawls in Kaitlin's absence.

## 2024-04-22 NOTE — TELEPHONE ENCOUNTER
Spoke to Miguelina about starting Abilify and informed her that if she cannot wait until Kaitlin's return, covering provider would be willing to start her on a low dose of 2 MG daily.  Informed her that she can call the office back in about a week if she is tolerating it well and provider can again review for possible increase.  She is requesting that script be sent to pharmacy.    Will refer to Dr Rawls for review.

## 2024-04-22 NOTE — TELEPHONE ENCOUNTER
Patient called to speak with provider to get back on Abilify 10 mg. Patient stated she's not doing well without medication and would like to get back on it.

## 2024-05-06 ENCOUNTER — TELEMEDICINE (OUTPATIENT)
Dept: PSYCHIATRY | Facility: CLINIC | Age: 59
End: 2024-05-06
Payer: COMMERCIAL

## 2024-05-06 DIAGNOSIS — Z51.81 MEDICATION MONITORING ENCOUNTER: ICD-10-CM

## 2024-05-06 DIAGNOSIS — F31.62 BIPOLAR 1 DISORDER, MIXED, MODERATE (HCC): Primary | ICD-10-CM

## 2024-05-06 PROCEDURE — 99214 OFFICE O/P EST MOD 30 MIN: CPT | Performed by: PHYSICIAN ASSISTANT

## 2024-05-06 RX ORDER — ARIPIPRAZOLE 10 MG/1
TABLET ORAL
Qty: 30 TABLET | Refills: 2 | Status: SHIPPED | OUTPATIENT
Start: 2024-05-06

## 2024-05-06 NOTE — PSYCH
Virtual Regular Visit    Verification of patient location:    Patient is located at Home in the following state in which I hold an active license [place]      Assessment/Plan:    Problem List Items Addressed This Visit    None  Visit Diagnoses       Bipolar 1 disorder, mixed, moderate (HCC)    -  Primary    Relevant Medications    ARIPiprazole (ABILIFY) 10 mg tablet    Medication monitoring encounter                Goals addressed in session: Goal 1          Reason for visit is   Chief Complaint   Patient presents with    Medication Management    Follow-up    Virtual Regular Visit          Encounter provider Clementine Campuzano PA-C      Recent Visits  No visits were found meeting these conditions.  Showing recent visits within past 7 days and meeting all other requirements  Today's Visits  Date Type Provider Dept   05/06/24 Telemedicine Clementine Campuzano PA-C [facility]   Showing today's visits and meeting all other requirements  Future Appointments  No visits were found meeting these conditions.  Showing future appointments within next 150 days and meeting all other requirements       The patient was identified by name and date of birth. Miguelina Russo was informed that this is a telemedicine visit and that the visit is being conducted throughthe Epic Embedded platform. She agrees to proceed..  My office door was closed. No one else was in the room.  She acknowledged consent and understanding of privacy and security of the video platform. The patient has agreed to participate and understands they can discontinue the visit at any time.    Patient is aware this is a billable service.     Subjective  Miguelina Russo is a 58 y.o. female with history of bipolar disorder.      STEWART Mcintyre was seen for follow-up of bipolar disorder and for medication management.  At her last visit she was started on lurasidone 20 mg daily with dinner.  She had called due to having side effect of insomnia with the  lurasidone so she stopped taking it.  She had then called after several days requesting to restart the Abilify.  Abilify 2 mg was started.  She has been maintained on lamotrigine, Cymbalta, gabapentin and trazodone for sleep.  States that she has noted a slight improvement during the day since being back on the Abilify.    Her motivation is slightly improved and she is not as lethargic.  Also states that she is sleeping better.  No adverse effects noted with the Abilify and requesting an increase to previous dose which was more therapeutic for her.  States that her anxiety has continued to be high.  Physically states that she has been doing okay.  States that her cholecystectomy has now been pushed to August.  States that she has been physically doing okay.  No new medication or medical issues noted.  States that she has not been taking Requip since being off the lurasidone due to restless legs being resolved.  We will discontinue this off her list.    Past Medical History:   Diagnosis Date    Anxiety     Depression     GERD (gastroesophageal reflux disease)     Ovarian cyst     Urinary incontinence        Past Surgical History:   Procedure Laterality Date    BACK SURGERY      fusion    CERVICAL SPINE SURGERY      CYSTOSCOPY  03/13/2018         OOPHORECTOMY Right     AL LAPAROSCOPY SLING OPERATION STRESS INCONT N/A 4/24/2018    Procedure: INSERTION PUBOVAGINAL SLING (FEMALE) Trans obturator mid urethral sling insertion, CYSTOSCOPY;  Surgeon: James Mesa MD;  Location: [facility];  Service: Urology       Current Outpatient Medications   Medication Sig Dispense Refill    ARIPiprazole (ABILIFY) 10 mg tablet 1/2 po qhs x 3 nights then 1 po qhs 30 tablet 2    Calcium Carbonate (CALTRATE 600 PO) Take by mouth      DULoxetine (CYMBALTA) 60 mg delayed release capsule TAKE ONE CAPSULE BY MOUTH EVERY DAY 90 capsule 1    gabapentin (NEURONTIN) 400 mg capsule Take 2 capsules (800 mg total) by mouth 3 (three)  times a day 540 capsule 1    lamoTRIgine (LaMICtal) 25 mg tablet TAKE EIGHT TABLETS BY MOUTH AT BEDTIME      omeprazole (PriLOSEC) 20 mg delayed release capsule Take 1 capsule (20 mg total) by mouth daily 90 capsule 3    traZODone (DESYREL) 50 mg tablet TAKE THREE TABLETS BY MOUTH AT BEDTIME AS NEEDED 270 tablet 1     No current facility-administered medications for this visit.        Allergies   Allergen Reactions    Tylenol With Codeine #3 [Acetaminophen-Codeine] Hyperactivity    Azithromycin Rash    Erythromycin Rash       Review of Systems  As noted in HPI  Video Exam    There were no vitals filed for this visit.    Physical Exam   Mental status exam:  Speech is clear, coherent, normal rate and volume  Adequate hygiene, good eye contact  Affect is currently appropriate, mood is anxious  Linear and coherent thought process  No suicidal or homicidal ideation  No psychotic signs or symptoms noted, no hallucinations and no delusions were voiced  Cognition appears intact  Insight and judgment is intact    Medications the treatment plan as follows:   Abilify 2 mg at bedtime, will increase to 5 mg at bedtime for 3 nights then increase to 10 mg at bedtime  She had been on 10 mg dosage in the past and tolerated it well  lamotrigine 200 mg daily  Cymbalta 60 mg daily  Gabapentin 800 mg 3 times daily  Trazodone 50 mg 2-3 at bedtime as needed    We will follow-up in 1 month and she will call me sooner if any questions or concerns  She will follow-up on Friday, June 7, 2024 at 10:30 AM for a virtual appointment  Visit Time    Visit Start Time: 900  Visit Stop Time: 916  Total Visit Duration:  16 minutes

## 2024-06-03 RX ORDER — SODIUM CHLORIDE 9 MG/ML
125 INJECTION, SOLUTION INTRAVENOUS CONTINUOUS
Status: CANCELLED | OUTPATIENT
Start: 2024-06-03

## 2024-06-04 ENCOUNTER — HOSPITAL ENCOUNTER (OUTPATIENT)
Dept: GASTROENTEROLOGY | Facility: HOSPITAL | Age: 59
Setting detail: OUTPATIENT SURGERY
Discharge: HOME | End: 2024-06-04
Attending: SURGERY
Payer: COMMERCIAL

## 2024-06-04 ENCOUNTER — ANESTHESIA (OUTPATIENT)
Dept: GASTROENTEROLOGY | Facility: HOSPITAL | Age: 59
End: 2024-06-04

## 2024-06-04 ENCOUNTER — ANESTHESIA EVENT (OUTPATIENT)
Dept: GASTROENTEROLOGY | Facility: HOSPITAL | Age: 59
End: 2024-06-04

## 2024-06-04 VITALS
OXYGEN SATURATION: 98 % | TEMPERATURE: 96.9 F | DIASTOLIC BLOOD PRESSURE: 79 MMHG | HEART RATE: 74 BPM | SYSTOLIC BLOOD PRESSURE: 115 MMHG | RESPIRATION RATE: 16 BRPM

## 2024-06-04 DIAGNOSIS — Z12.11 SCREENING FOR COLON CANCER: ICD-10-CM

## 2024-06-04 PROCEDURE — 45380 COLONOSCOPY AND BIOPSY: CPT | Performed by: SURGERY

## 2024-06-04 PROCEDURE — 88305 TISSUE EXAM BY PATHOLOGIST: CPT | Performed by: PATHOLOGY

## 2024-06-04 RX ORDER — PROPOFOL 10 MG/ML
INJECTION, EMULSION INTRAVENOUS CONTINUOUS PRN
Status: DISCONTINUED | OUTPATIENT
Start: 2024-06-04 | End: 2024-06-04

## 2024-06-04 RX ORDER — PROPOFOL 10 MG/ML
INJECTION, EMULSION INTRAVENOUS AS NEEDED
Status: DISCONTINUED | OUTPATIENT
Start: 2024-06-04 | End: 2024-06-04

## 2024-06-04 RX ORDER — SODIUM CHLORIDE 9 MG/ML
125 INJECTION, SOLUTION INTRAVENOUS CONTINUOUS
Status: DISCONTINUED | OUTPATIENT
Start: 2024-06-04 | End: 2024-06-08 | Stop reason: HOSPADM

## 2024-06-04 RX ADMIN — PROPOFOL 120 MCG/KG/MIN: 10 INJECTION, EMULSION INTRAVENOUS at 10:05

## 2024-06-04 RX ADMIN — PROPOFOL 120 MG: 10 INJECTION, EMULSION INTRAVENOUS at 10:04

## 2024-06-04 RX ADMIN — SODIUM CHLORIDE 125 ML/HR: 0.9 INJECTION, SOLUTION INTRAVENOUS at 09:22

## 2024-06-04 NOTE — ANESTHESIA POSTPROCEDURE EVALUATION
Post-Op Assessment Note    CV Status:  Stable  Pain Score: 0    Pain management: adequate       Mental Status:  Alert and awake   Hydration Status:  Euvolemic   PONV Controlled:  Controlled   Airway Patency:  Patent     Post Op Vitals Reviewed: Yes    No anethesia notable event occurred.    Staff: CRNA, Anesthesiologist               /79 (06/04/24 1032)    Temp (!) 96.9 °F (36.1 °C) (06/04/24 1032)    Pulse 88 (06/04/24 1032)   Resp 18 (06/04/24 1032)    SpO2 98 % (06/04/24 1032)

## 2024-06-04 NOTE — INTERVAL H&P NOTE
H&P reviewed. After examining the patient I find no changes in the patients condition since the H&P had been written.    Vitals:    06/04/24 0915   BP: 132/85   Pulse: 78   Resp: 18   Temp: (!) 97.2 °F (36.2 °C)    First colonocscopy

## 2024-06-04 NOTE — H&P
No chief complaint on file.      Miguelina Russo is a 59 y.o.female who is here for :    No chief complaint on file.  Cholelithiasis with no signs of acute cholecystitis.  2. Echogenic lesion in the right lobe of the liver likely hemangiom    Currently:       Assessment/Plan:   Miguelina Russo is a 59 y.o.female who is here for possible Gallbladder disease.         Upon evaluation today patient has: Bilary colic and Chronic Cholecystitis    .     Plan: Laparoscopic Cholecystectomy with possible Intraoperative Cholangiogram  Possible Robotic assisted      2.  Screening colonoscopy.  Asymptomatic no family history.    Post Op Pain Management: Norco      Ultrasound findings: Multiple large gallstones measuring up to 1.7 cm, 18 cm liver       There is no height or weight on file to calculate BMI.     Preoperative Clearance: None    In preparation for this visit all relevant and known prior office notes, prior consultations, emergency room visits, blood work results, and imaging studies were personally reviewed.  A total of  50 minutes was spent reviewing all of this information,caring for this patient, providing differential diagnosis, instructions for management, counseling and coordination of care.  This also includes planning surgical intervention where indicated.      Images:       Patient understands hernia occurrence or re-occurrence risk is higher in a diabetic, tobacco user, with elevated BMIs.     Patient was discussed and asked her medication list.    Discussion was held regarding anticoagulation, aspirin and Motrin use prior to surgery.  Most anticoagulation needs to stop 7 to 10 days prior to surgery.  Some can be stopped at 48 hours.  Patient has had all questions answered by the physician or physician representative regarding anticoagulation.  Patient is aware that they need to stop their anticoagulation preoperatively.    Discussion was held regarding weight loss medication and diabetic medication  with that also is used for weight loss.  These medications have significant perioperative risk.  He has medications need to be stopped 7 to 10 days prior to surgery.  Patient understands and is aware they need to stop these type of medications preoperatively.    Patient has been given a list of anticoagulation and/or weight loss/diabetes medications that would be affected by this and they have confirmed the are either not on these drugs or have understood their directions to stop them at least 1 week prior to surgery or as instructed.    Most herbal medication should be discontinued a week to 10 days prior to surgery.    Diabetic medication such as insulin should be discussed with her primary care physician or the physician that ordered the insulin or similar medications.  In general patients usually half their diabetic insulin the morning of the surgery but again this should be discussed with the physician.    Patient understands and agrees to this aforementioned protocol.          ____________________________________________________    HPI:  Miguelina Russo is a 59 y.o.female who was referred for evaluation of The encounter diagnosis was Screening for colon cancer.      Abdominal pain Location: in the RUQ without radiation, which is Intermittent  and over 3 months     no nausea and no vomiting,     regular bowel movement.     Duration of pain or symptoms: over 1 month  History of depression and bipolar.  No significant other medical history than this.  Should be cleared for surgery.  No previous colonoscopy, no family history and no blood in her stools    Prior Colonoscopy : None     Prior Abdominal Surgery: Pfannenstiel incision for previous ovarian    Anticoagulation: none    A1c:     Smoking Status: Non-smoker     Imaging:   No orders to display           LABS:    Lab Results   Component Value Date    K 4.8 03/09/2024     03/09/2024    CO2 31 03/09/2024    BUN 15 03/09/2024    CREATININE 0.87 03/09/2024  "   GLUF 97 03/09/2024    CALCIUM 9.4 03/09/2024    AST 14 03/09/2024    ALT 14 03/09/2024    ALKPHOS 64 03/09/2024    EGFR 72 03/09/2024       Lab Results   Component Value Date    WBC 5.79 03/09/2024    HGB 14.7 03/09/2024    HCT 44.7 03/09/2024    MCV 99 (H) 03/09/2024     03/09/2024     No results found for: \"INR\", \"PROTIME\"  Lab Results   Component Value Date    HGBA1C 5.5 03/09/2024           ROS:  General ROS: negative for - chills, fatigue, fever or night sweats, weight loss  Respiratory ROS: no cough, shortness of breath, or wheezing  Cardiovascular ROS: no chest pain or dyspnea on exertion  Genito-Urinary ROS: no dysuria, trouble voiding, or hematuria  Musculoskeletal ROS: negative for - gait disturbance, joint pain or muscle pain  Neurological ROS: no TIA or stroke symptoms  Gastrointestinal ROS: See HPI   GI ROS: see HPI  Skin ROS: no new rashes or lesions   Lymphatic ROS: no new adenopathy noted by pt.   GYN ROS: see HPI, no new GYN history or bleeding noted  Psy ROS: no new mental or behavioral disturbances       Patient Active Problem List   Diagnosis    Stress incontinence of urine    Bipolar 1 disorder (HCC)    Major depression, recurrent (HCC)    Fibromyalgia    Cellulitis of right lower extremity    Chronic bilateral low back pain without sciatica    MDD (major depressive disorder), recurrent severe, without psychosis (HCC)    Panic disorder    Other hyperlipidemia    Asymptomatic menopause    Vitamin D insufficiency    Stage 3a chronic kidney disease (HCC)    Hyperglycemia    Epigastric pain    RUQ pain         Allergies:  Benadryl [diphenhydramine], Tylenol with codeine #3 [acetaminophen-codeine], Azithromycin, and Erythromycin      Current Outpatient Medications:     ARIPiprazole (ABILIFY) 10 mg tablet, 1/2 po qhs x 3 nights then 1 po qhs, Disp: 30 tablet, Rfl: 2    Calcium Carbonate (CALTRATE 600 PO), Take by mouth, Disp: , Rfl:     DULoxetine (CYMBALTA) 60 mg delayed release capsule, " TAKE ONE CAPSULE BY MOUTH EVERY DAY, Disp: 90 capsule, Rfl: 1    gabapentin (NEURONTIN) 400 mg capsule, Take 2 capsules (800 mg total) by mouth 3 (three) times a day, Disp: 540 capsule, Rfl: 1    lamoTRIgine (LaMICtal) 25 mg tablet, TAKE EIGHT TABLETS BY MOUTH AT BEDTIME, Disp: , Rfl:     omeprazole (PriLOSEC) 20 mg delayed release capsule, Take 1 capsule (20 mg total) by mouth daily, Disp: 90 capsule, Rfl: 3    traZODone (DESYREL) 50 mg tablet, TAKE THREE TABLETS BY MOUTH AT BEDTIME AS NEEDED, Disp: 270 tablet, Rfl: 1    Current Facility-Administered Medications:     sodium chloride 0.9 % infusion, 125 mL/hr, Intravenous, Continuous, Laboni Dheeraj, DO, Last Rate: 125 mL/hr at 06/04/24 1000, Continue from Pre-op at 06/04/24 1000    Past Medical History:   Diagnosis Date    Anxiety     Depression     GERD (gastroesophageal reflux disease)     Ovarian cyst     Urinary incontinence        Past Surgical History:   Procedure Laterality Date    BACK SURGERY      fusion    CERVICAL SPINE SURGERY      CYSTOSCOPY  03/13/2018         OOPHORECTOMY Right     TX LAPAROSCOPY SLING OPERATION STRESS INCONT N/A 4/24/2018    Procedure: INSERTION PUBOVAGINAL SLING (FEMALE) Trans obturator mid urethral sling insertion, CYSTOSCOPY;  Surgeon: James Mesa MD;  Location[facility];  Service: Urology       Family History   Problem Relation Age of Onset    No Known Problems Mother     No Known Problems Father     No Known Problems Sister     No Known Problems Sister     No Known Problems Daughter     No Known Problems Daughter     No Known Problems Daughter     No Known Problems Maternal Grandmother     No Known Problems Maternal Grandfather     No Known Problems Paternal Grandmother     No Known Problems Paternal Grandfather     No Known Problems Paternal Aunt     Breast cancer Neg Hx         reports that she has been smoking cigarettes. She has never been exposed to tobacco smoke. She has never used smokeless tobacco.  She reports that she does not currently use alcohol. She reports that she does not use drugs.      Exam:   Vitals:    06/04/24 0915   BP: 132/85   Pulse: 78   Resp: 18   Temp: (!) 97.2 °F (36.2 °C)       PHYSICAL EXAM  General Appearance:    Alert, cooperative, no distress,    Head:    Normocephalic without obvious abnormality   Eyes:    PERRL, conjunctiva/corneas clear,       Neck:   Supple, no adenopathy, no JVD   Back:     Symmetric, no spinal or CVA tenderness   Lungs:     Clear to auscultation bilaterally, no wheezing or rhonchi   Heart:    Regular rate and rhythm, S1 and S2 normal, no murmur   Abdomen:     abdomen is soft without significant tenderness, masses, organomegaly or guarding Bowel sounds are normal.     Extremities:   Extremities normal. No clubbing, cyanosis or edema   Psych:   Normal Affect, AOx3.    Neurologic:  Skin:   CNII-XII intact. Strength symmetric, speech intact    Warm, dry, intact, no visible rashes or lesions      Informed consent for procedure was personally discussed, reviewed, and signed by Dr. Cisneros. Discussion by Dr. Cisneros was carried out regarding risks, benefits, and alternatives with the patient. Risks include but are not limited to:  bleeding, infection, and delayed wound healing or an open wound, pulmonary embolus, leaks from bowel or bile ducts or other viscus, transfusions, death.  Discussed in further detail the more common complications and their rates of occurrence.   was used if necessary.  Patient expressed understanding of the issues discussed and wished/consented to proceed.  All questions were answered by Dr. Cisneros.    Discussion performed between patient and the provider signing below.     Signature:   Mariya Cisneros MD    Date: 6/4/2024 Time: 10:00 AM                          Some portions of this record may have been generated with voice recognition software. There may be translation, syntax,  or grammatical errors. Occasional wrong word or  "\"sound-a-like\" substitutions may have occurred due to the inherent limitations of the voice recognition software. Read the chart carefully and recognize, using context, where substitutions may have occurred. If you have any questions, please contact the dictating provider for clarification or correction, as needed. This encounter has been coded by a non-certified coder.   "

## 2024-06-04 NOTE — DISCHARGE INSTR - AVS FIRST PAGE
[facility]  Endoscopy Post-Operative Instructions  Dr. Mariya Cisneros MD, FACS    Procedure: Colonoscopy    Findings:  Diverticulosis and Colon Polyp(s)    Follow-Up: You will need a repeat Endoscopy in (generally) in 3- 5 years    Will await final pathology report for final determination of number of years until your follow up endoscopy, if you had polyps on this exam.  Different types of polyps require different lengths of follow up surveillance. Please call our office or your primary doctor's office if you have any questions, once the report is returned.        You should have an endoscopy sooner than recommended if you have any symptoms of bleeding or change in stools or other concerns.      You will receive a call from our office with your results, in addition to the the preliminary results you received today.      You will usually receive a follow-up letter from our office in 1-2 weeks.  Call the office if you do not hear from us. You are welcome to also schedule an office visit if desired to discuss the results further.     It is your responsibility to contact our office for results in 1- 2 weeks if you do not hear from us.    If a follow up endoscopy is needed, you are responsible for arranging that follow up appointment at the appropriate time.  The office may or may not issue a reminder at that future time.  Please take responsibility for your own follow up healthcare.      Diet: Eat a light snack first, and then resume your previous diet.    Call the office if you have unusual fevers, chills, nausea or vomiting or abdominal pain.  Report to the emergency room with these are severe in nature.      Activity: Do not drive a car, operate machinery, or sign legal documents for 24 hours after your procedure. Normal activity may be resumed on the day following the procedure.     Call the office at [phone] for any of the following: Severe abdominal pain, significant rectal bleeding,  chills, or fever above 100°, new onset of persistent cough or persistent vomiting.     [facility]  [address], [address]  [place] [address]  Phone: [phone]

## 2024-06-04 NOTE — ANESTHESIA PREPROCEDURE EVALUATION
Procedure:  COLONOSCOPY    Relevant Problems   CARDIO   (+) Other hyperlipidemia      /RENAL   (+) Stage 3a chronic kidney disease (HCC)      MUSCULOSKELETAL   (+) Chronic bilateral low back pain without sciatica   (+) Fibromyalgia      NEURO/PSYCH   (+) Chronic bilateral low back pain without sciatica   (+) Fibromyalgia   (+) MDD (major depressive disorder), recurrent severe, without psychosis (HCC)   (+) Major depression, recurrent (HCC)   (+) Panic disorder      Behavioral Health   (+) Bipolar 1 disorder (HCC)        Physical Exam    Airway    Mallampati score: I  TM Distance: >3 FB  Neck ROM: full     Dental   No notable dental hx     Cardiovascular  Cardiovascular exam normal    Pulmonary  Pulmonary exam normal     Other Findings  post-pubertal.      Anesthesia Plan  ASA Score- 2     Anesthesia Type- IV sedation with anesthesia with ASA Monitors.         Additional Monitors:     Airway Plan:            Plan Factors-Exercise tolerance (METS): >4 METS.    Chart reviewed.   Existing labs reviewed. Patient summary reviewed.    Patient is not a current smoker.      Obstructive sleep apnea risk education given perioperatively.        Induction- intravenous.    Postoperative Plan-     Perioperative Resuscitation Plan - Level 1 - Full Code.       Informed Consent- Anesthetic plan and risks discussed with patient.  I personally reviewed this patient with the CRNA. Discussed and agreed on the Anesthesia Plan with the CRNA..

## 2024-06-06 PROCEDURE — 88305 TISSUE EXAM BY PATHOLOGIST: CPT | Performed by: PATHOLOGY

## 2024-06-07 ENCOUNTER — TELEMEDICINE (OUTPATIENT)
Dept: PSYCHIATRY | Facility: CLINIC | Age: 59
End: 2024-06-07

## 2024-06-07 ENCOUNTER — TELEPHONE (OUTPATIENT)
Dept: SURGERY | Facility: CLINIC | Age: 59
End: 2024-06-07

## 2024-06-07 DIAGNOSIS — Z51.81 MEDICATION MONITORING ENCOUNTER: ICD-10-CM

## 2024-06-07 DIAGNOSIS — M79.7 FIBROMYALGIA: ICD-10-CM

## 2024-06-07 DIAGNOSIS — F31.62 BIPOLAR 1 DISORDER, MIXED, MODERATE (HCC): Primary | ICD-10-CM

## 2024-06-07 DIAGNOSIS — F41.9 ANXIETY: ICD-10-CM

## 2024-06-07 RX ORDER — GABAPENTIN 400 MG/1
800 CAPSULE ORAL 3 TIMES DAILY
Qty: 540 CAPSULE | Refills: 1 | Status: SHIPPED | OUTPATIENT
Start: 2024-06-07

## 2024-06-07 RX ORDER — DULOXETIN HYDROCHLORIDE 30 MG/1
30 CAPSULE, DELAYED RELEASE ORAL DAILY
Qty: 30 CAPSULE | Refills: 1 | Status: SHIPPED | OUTPATIENT
Start: 2024-06-07

## 2024-06-07 RX ORDER — ARIPIPRAZOLE 10 MG/1
TABLET ORAL
Qty: 90 TABLET | Refills: 1 | Status: SHIPPED | OUTPATIENT
Start: 2024-06-07

## 2024-06-07 NOTE — TELEPHONE ENCOUNTER
----- Message from Judy Go PA-C sent at 6/6/2024  3:07 PM EDT -----  Please let her know all polyps were hyperplastic polyps/ She can follow up for another polyps in 3 years.

## 2024-06-07 NOTE — PSYCH
Virtual Regular Visit    Verification of patient location:    Patient is located at Home in the following state in which I hold an active license [place]      Assessment/Plan:    Problem List Items Addressed This Visit          Surgery/Wound/Pain    Fibromyalgia    Relevant Medications    DULoxetine (CYMBALTA) 30 mg delayed release capsule    gabapentin (NEURONTIN) 400 mg capsule     Other Visit Diagnoses       Bipolar 1 disorder, mixed, moderate (HCC)    -  Primary    Relevant Medications    DULoxetine (CYMBALTA) 30 mg delayed release capsule    gabapentin (NEURONTIN) 400 mg capsule    ARIPiprazole (ABILIFY) 10 mg tablet    Medication monitoring encounter        Anxiety        Relevant Medications    DULoxetine (CYMBALTA) 30 mg delayed release capsule            Goals addressed in session: Goal 1          Reason for visit is   Chief Complaint   Patient presents with    Follow-up    Medication Management    Virtual Regular Visit          Encounter provider Clementine Campuzano PA-C      Recent Visits  No visits were found meeting these conditions.  Showing recent visits within past 7 days and meeting all other requirements  Today's Visits  Date Type Provider Dept   06/07/24 Telemedicine [facility]ine Campuz[facility]   Showing today's visits and meeting all other requirements  Future Appointments  No visits were found meeting these conditions.  Showing future appointments within next 150 days and meeting all other requirements       The patient was identified by name and date of birth. Miguelina Russo was informed that this is a telemedicine visit and that the visit is being conducted throughthe Epic Embedded platform. She agrees to proceed..  My office door was closed. No one else was in the room.  She acknowledged consent and understanding of privacy and security of the video platform. The patient has agreed to participate and understands they can discontinue the visit at any  time.    Patient is aware this is a billable service.     Subjective  Miguelina Russo is a 59 y.o. female with history of bipolar disorder.      STEWART Mcintyre was seen for follow-up of bipolar disorder and for medication management.  Her last visit she was titrated gradually to 10 mg of Abilify.  She has been maintained on Lamictal, Cymbalta, gabapentin and trazodone for sleep.  Reports that she has continued to have episodes of hypomania.  States that she feels as though her depression has been stable and she is feeling well since being on Abilify.  States that she continues to have episodes about twice per week where she feels increasingly manic and getting increased things accomplished.  States that she is only sleeping about 4 hours on those nights and that is with taking the trazodone.  She is not having any suicidal or homicidal ideation.  No psychotic signs or symptoms noted.  Taking medication as prescribed and no adverse effects noted.  States that her fibromyalgia pain has been manageable.    Past Medical History:   Diagnosis Date    Anxiety     Depression     GERD (gastroesophageal reflux disease)     Ovarian cyst     Urinary incontinence        Past Surgical History:   Procedure Laterality Date    BACK SURGERY      fusion    CERVICAL SPINE SURGERY      CYSTOSCOPY  03/13/2018         OOPHORECTOMY Right     [place] LAPAROSCOPY SLING OPERATION STRESS INCONT N/A 4/24/2018    Procedure: INSERTION PUBOVAGINAL SLING (FEMALE) Trans obturator mid urethral sling insertion, CYSTOSCOPY;  Surgeon: James Mesa MD;  [facility];  Service: Urology       Current Outpatient Medications   Medication Sig Dispense Refill    ARIPiprazole (ABILIFY) 10 mg tablet 1 po qhs 90 tablet 1    DULoxetine (CYMBALTA) 30 mg delayed release capsule Take 1 capsule (30 mg total) by mouth daily 30 capsule 1    gabapentin (NEURONTIN) 400 mg capsule Take 2 capsules (800 mg total) by mouth 3 (three) times a day 540 capsule 1     Calcium Carbonate (CALTRATE 600 PO) Take by mouth      lamoTRIgine (LaMICtal) 25 mg tablet TAKE EIGHT TABLETS BY MOUTH AT BEDTIME      omeprazole (PriLOSEC) 20 mg delayed release capsule Take 1 capsule (20 mg total) by mouth daily 90 capsule 3    traZODone (DESYREL) 50 mg tablet TAKE THREE TABLETS BY MOUTH AT BEDTIME AS NEEDED 270 tablet 1     No current facility-administered medications for this visit.     Facility-Administered Medications Ordered in Other Visits   Medication Dose Route Frequency Provider Last Rate Last Admin    sodium chloride 0.9 % infusion  125 mL/hr Intravenous Continuous Laboni Dheeraj DO   Stopped at 06/04/24 1050        Allergies   Allergen Reactions    Benadryl [Diphenhydramine] Hyperactivity    Tylenol With Codeine #3 [Acetaminophen-Codeine] Hyperactivity    Azithromycin Rash    Erythromycin Rash       Review of Systems  As noted in HPI  Video Exam    There were no vitals filed for this visit.    Physical Exam   Mental status exam:  Speech is clear, coherent, normal rate and volume  Adequate hygiene, good eye contact  Affect is currently appropriate, mood is labile  Linear and coherent thought process  No suicidal or homicidal ideation  No psychotic signs or symptoms noted, no hallucinations and no delusions were voiced  Cognition appears intact  Insight and judgment is intact    medications the treatment plan as follows:     Decrease Cymbalta from 60 mg to 30 mg daily    Abilify 10 mg at bedtime    lamotrigine 200 mg daily    Gabapentin 800 mg 3 times daily    Trazodone 50 mg 2-3 at bedtime as needed     We will follow-up in 1 month and she will call me sooner if any questions or concerns    Aware of after-hours on-call service and 9 88 crisis line  Visit Time    Visit Start Time: 1030  Visit Stop Time: 1048  Total Visit Duration:  18 minutes with patient and additional time reviewing chart, charting, medications

## 2024-06-19 ENCOUNTER — HOSPITAL ENCOUNTER (OUTPATIENT)
Dept: MRI IMAGING | Facility: HOSPITAL | Age: 59
Discharge: HOME | End: 2024-06-19
Payer: COMMERCIAL

## 2024-06-19 DIAGNOSIS — D18.03 HEMANGIOMA OF LIVER: ICD-10-CM

## 2024-06-19 PROCEDURE — 74183 MRI ABD W/O CNTR FLWD CNTR: CPT

## 2024-06-19 PROCEDURE — A9585 GADOBUTROL INJECTION: HCPCS | Performed by: DIETITIAN, REGISTERED

## 2024-06-19 RX ORDER — GADOBUTROL 604.72 MG/ML
7 INJECTION INTRAVENOUS
Status: COMPLETED | OUTPATIENT
Start: 2024-06-19 | End: 2024-06-19

## 2024-06-19 RX ADMIN — GADOBUTROL 7 ML: 604.72 INJECTION INTRAVENOUS at 08:30

## 2024-06-21 ENCOUNTER — TELEMEDICINE (OUTPATIENT)
Dept: PSYCHIATRY | Facility: CLINIC | Age: 59
End: 2024-06-21
Payer: COMMERCIAL

## 2024-06-21 ENCOUNTER — OFFICE VISIT (OUTPATIENT)
Dept: FAMILY MEDICINE CLINIC | Facility: CLINIC | Age: 59
End: 2024-06-21
Payer: COMMERCIAL

## 2024-06-21 VITALS
DIASTOLIC BLOOD PRESSURE: 70 MMHG | HEIGHT: 67 IN | OXYGEN SATURATION: 95 % | SYSTOLIC BLOOD PRESSURE: 108 MMHG | RESPIRATION RATE: 16 BRPM | WEIGHT: 175 LBS | TEMPERATURE: 97.9 F | HEART RATE: 71 BPM | BODY MASS INDEX: 27.47 KG/M2

## 2024-06-21 DIAGNOSIS — R73.9 HYPERGLYCEMIA: ICD-10-CM

## 2024-06-21 DIAGNOSIS — F31.62 BIPOLAR 1 DISORDER, MIXED, MODERATE (HCC): Primary | ICD-10-CM

## 2024-06-21 DIAGNOSIS — N18.31 STAGE 3A CHRONIC KIDNEY DISEASE (HCC): ICD-10-CM

## 2024-06-21 DIAGNOSIS — E78.49 OTHER HYPERLIPIDEMIA: Primary | ICD-10-CM

## 2024-06-21 DIAGNOSIS — Z00.00 MEDICARE ANNUAL WELLNESS VISIT, SUBSEQUENT: ICD-10-CM

## 2024-06-21 DIAGNOSIS — M79.7 FIBROMYALGIA: ICD-10-CM

## 2024-06-21 DIAGNOSIS — F33.2 MDD (MAJOR DEPRESSIVE DISORDER), RECURRENT SEVERE, WITHOUT PSYCHOSIS (HCC): ICD-10-CM

## 2024-06-21 DIAGNOSIS — F31.9 BIPOLAR 1 DISORDER (HCC): ICD-10-CM

## 2024-06-21 DIAGNOSIS — E55.9 VITAMIN D INSUFFICIENCY: ICD-10-CM

## 2024-06-21 DIAGNOSIS — Z51.81 MEDICATION MONITORING ENCOUNTER: ICD-10-CM

## 2024-06-21 DIAGNOSIS — F41.9 ANXIETY: ICD-10-CM

## 2024-06-21 PROCEDURE — G0439 PPPS, SUBSEQ VISIT: HCPCS | Performed by: FAMILY MEDICINE

## 2024-06-21 PROCEDURE — 99214 OFFICE O/P EST MOD 30 MIN: CPT | Performed by: FAMILY MEDICINE

## 2024-06-21 PROCEDURE — 99214 OFFICE O/P EST MOD 30 MIN: CPT | Performed by: PHYSICIAN ASSISTANT

## 2024-06-21 RX ORDER — HYDROXYZINE 50 MG/1
TABLET, FILM COATED ORAL
Qty: 90 TABLET | Refills: 1 | Status: SHIPPED | OUTPATIENT
Start: 2024-06-21

## 2024-06-21 NOTE — ASSESSMENT & PLAN NOTE
GFR stable. Encourage oral hydration. Will continue to monitor.   Lab Results   Component Value Date    EGFR 72 03/09/2024    EGFR 53 07/14/2023    EGFR 71 10/19/2022    CREATININE 0.87 03/09/2024    CREATININE 1.12 07/14/2023    CREATININE 0.93 10/19/2022

## 2024-06-21 NOTE — ASSESSMENT & PLAN NOTE
It was discussed about low-carb diet and regular exercise. Continue to monitor fasting glucose and A1c.

## 2024-06-21 NOTE — ASSESSMENT & PLAN NOTE
Stable on Abilify, Cymbalta, Lamictal and trazodone. Continue same. Continue follow-up with psychiatrist.

## 2024-06-21 NOTE — PROGRESS NOTES
Ambulatory Visit  Name: Miguelina Russo      : 1965      MRN: [id number]  Encounter Provider: Mike Leblanc MD  Encounter Date: 2024   Encounter department: [facility] [address] [facility]    Assessment & Plan   1. Other hyperlipidemia  Assessment & Plan:  Stable. It was discussed about low-fat diet and regular exercise. Continue to monitor fasting lipid profile.   Orders:  -     CBC and differential; Future  -     Comprehensive metabolic panel; Future  -     Lipid Panel with Direct LDL reflex; Future  -     TSH, 3rd generation with Free T4 reflex; Future  2. Hyperglycemia  Assessment & Plan:  It was discussed about low-carb diet and regular exercise. Continue to monitor fasting glucose and A1c.   Orders:  -     Hemoglobin A1C; Future  3. Vitamin D insufficiency  Assessment & Plan:  Stable. Will continue to monitor.  4. Stage 3a chronic kidney disease (HCC)  Assessment & Plan:  GFR stable. Encourage oral hydration. Will continue to monitor.   Lab Results   Component Value Date    EGFR 72 2024    EGFR 53 2023    EGFR 71 10/19/2022    CREATININE 0.87 2024    CREATININE 1.12 2023    CREATININE 0.93 10/19/2022     5. Bipolar 1 disorder (HCC)  Assessment & Plan:  Stable on Abilify, Cymbalta, Lamictal and trazodone. Continue same. Continue follow-up with psychiatrist.   6. MDD (major depressive disorder), recurrent severe, without psychosis (HCC)  Assessment & Plan:  Stable on Abilify, Cymbalta, Lamictal and trazodone. Continue same. Continue follow-up with psychiatrist.   7. Medicare annual wellness visit, subsequent  Assessment & Plan:  It was discussed about immunizations, nutrition and safety concerns.        Preventive health issues were discussed with patient, and age appropriate screening tests were ordered as noted in patient's After Visit Summary. Personalized health advice and appropriate referrals for health education or preventive services given if needed, as  noted in patient's After Visit Summary.    History of Present Illness     HPI   Patient Care Team:  Mike Leblanc MD as PCP - General (Family Medicine)    Review of Systems  Medical History Reviewed by provider this encounter:  Tobacco  Allergies  Meds  Problems  Med Hx  Surg Hx  Fam Hx       Annual Wellness Visit Questionnaire   Miguelina is here for her Subsequent Wellness visit.     Health Risk Assessment:   Patient rates overall health as very good. Patient feels that their physical health rating is same. Patient is very satisfied with their life. Eyesight was rated as same. Hearing was rated as same. Patient feels that their emotional and mental health rating is same. Patients states they are never, rarely angry. Patient states they are sometimes unusually tired/fatigued. Pain experienced in the last 7 days has been a lot. Patient's pain rating has been 5/10. Patient states that she has experienced no weight loss or gain in last 6 months.     Depression Screening:   PHQ-9 Score: 0      Fall Risk Screening:   In the past year, patient has experienced: no history of falling in past year      Urinary Incontinence Screening:   Patient has not leaked urine accidently in the last six months.     Home Safety:  Patient does not have trouble with stairs inside or outside of their home. Patient has working smoke alarms and has working carbon monoxide detector. Home safety hazards include: none.     Nutrition:   Current diet is Regular.     Medications:   Patient is currently taking over-the-counter supplements. OTC medications include: see medication list. Patient is able to manage medications.     Activities of Daily Living (ADLs)/Instrumental Activities of Daily Living (IADLs):   Walk and transfer into and out of bed and chair?: Yes  Dress and groom yourself?: Yes    Bathe or shower yourself?: Yes    Feed yourself? Yes  Do your laundry/housekeeping?: Yes  Manage your money, pay your bills and track your expenses?:  "Yes  Make your own meals?: Yes    Do your own shopping?: Yes    Previous Hospitalizations:   Any hospitalizations or ED visits within the last 12 months?: No      Advance Care Planning:   Living will: No    Durable POA for healthcare: No    Advanced directive: No      Cognitive Screening:   Provider or family/friend/caregiver concerned regarding cognition?: No    PREVENTIVE SCREENINGS      Cardiovascular Screening:    General: Screening Not Indicated and History Lipid Disorder      Diabetes Screening:     General: Screening Current      Colorectal Cancer Screening:     General: Screening Current      Breast Cancer Screening:     General: Screening Current      Cervical Cancer Screening:    General: Risks and Benefits Discussed      Osteoporosis Screening:    General: Risks and Benefits Discussed      Abdominal Aortic Aneurysm (AAA) Screening:        General: Risks and Benefits Discussed      Lung Cancer Screening:     General: Screening Not Indicated      Hepatitis C Screening:    General: Risks and Benefits Discussed    Screening, Brief Intervention, and Referral to Treatment (SBIRT)    Screening  Typical number of drinks in a day: 0  Typical number of drinks in a week: 0  Interpretation: Low risk drinking behavior.    Brief Intervention  Alcohol & drug use screenings were reviewed. No concerns regarding substance use disorder identified.     Other Counseling Topics:   Calcium and vitamin D intake and regular weightbearing exercise.        No results found.    Objective     /70 (BP Location: Left arm, Patient Position: Sitting, Cuff Size: Adult)   Pulse 71   Temp 97.9 °F (36.6 °C) (Tympanic)   Resp 16   Ht 5' 7\" (1.702 m)   Wt 79.4 kg (175 lb)   SpO2 95%   BMI 27.41 kg/m²     Physical Exam  Administrative Statements           "

## 2024-06-21 NOTE — PROGRESS NOTES
Ambulatory Visit  Name: Miguelina Russo      : 1965      MRN: [id number]  Encounter Provider: Mike Leblanc MD  Encounter Date: 2024   Encounter department: [facility] [address] [facility]    Assessment & Plan   1. Other hyperlipidemia  Assessment & Plan:  Stable. It was discussed about low-fat diet and regular exercise. Continue to monitor fasting lipid profile.   Orders:  -     CBC and differential; Future  -     Comprehensive metabolic panel; Future  -     Lipid Panel with Direct LDL reflex; Future  -     TSH, 3rd generation with Free T4 reflex; Future  2. Hyperglycemia  Assessment & Plan:  It was discussed about low-carb diet and regular exercise. Continue to monitor fasting glucose and A1c.   Orders:  -     Hemoglobin A1C; Future  3. Vitamin D insufficiency  Assessment & Plan:  Stable. Will continue to monitor.  4. Stage 3a chronic kidney disease (HCC)  Assessment & Plan:  GFR stable. Encourage oral hydration. Will continue to monitor.   Lab Results   Component Value Date    EGFR 72 2024    EGFR 53 2023    EGFR 71 10/19/2022    CREATININE 0.87 2024    CREATININE 1.12 2023    CREATININE 0.93 10/19/2022     5. Bipolar 1 disorder (HCC)  Assessment & Plan:  Stable on Abilify, Cymbalta, Lamictal and trazodone. Continue same. Continue follow-up with psychiatrist.   6. MDD (major depressive disorder), recurrent severe, without psychosis (HCC)  Assessment & Plan:  Stable on Abilify, Cymbalta, Lamictal and trazodone. Continue same. Continue follow-up with psychiatrist.   7. Medicare annual wellness visit, subsequent  Assessment & Plan:  It was discussed about immunizations, nutrition and safety concerns.       Tobacco Cessation Counseling: Tobacco cessation counseling was provided. The patient is sincerely urged to quit consumption of tobacco. She is not ready to quit tobacco.         History of Present Illness     60 y/o female presenting to the office for follow-up of  multiple medical problems and annual medicare wellness visit. She reports she is taking her medications regularly without any reported side effects. We reviewed her bloodwork from March and patient expressed understanding. All blood work was stable. She is scheduled to have a cholecystectomy in August.       Review of Systems   Constitutional:  Negative for chills and fever.   HENT:  Negative for ear pain and sore throat.    Eyes:  Negative for pain and visual disturbance.   Respiratory:  Negative for cough and shortness of breath.    Cardiovascular:  Negative for chest pain and palpitations.   Gastrointestinal:  Negative for abdominal pain and vomiting.   Genitourinary:  Negative for dysuria and hematuria.   Musculoskeletal:  Negative for arthralgias and back pain.   Skin:  Negative for color change and rash.   Neurological:  Negative for seizures and syncope.   All other systems reviewed and are negative.    Past Medical History:   Diagnosis Date   • Anxiety    • Depression    • GERD (gastroesophageal reflux disease)    • Ovarian cyst    • Urinary incontinence      Past Surgical History:   Procedure Laterality Date   • BACK SURGERY      fusion   • CERVICAL SPINE SURGERY     • CYSTOSCOPY  03/13/2018        • OOPHORECTOMY Right    • [place] LAPAROSCOPY SLING OPERATION STRESS INCONT N/A 4/24/2018    Procedure: INSERTION PUBOVAGINAL SLING (FEMALE) Trans obturator mid urethral sling insertion, CYSTOSCOPY;  Surgeon: James Mesa MD;  Location[facility];  Service: Urology     Family History   Problem Relation Age of Onset   • No Known Problems Mother    • No Known Problems Father    • No Known Problems Sister    • No Known Problems Sister    • No Known Problems Daughter    • No Known Problems Daughter    • No Known Problems Daughter    • No Known Problems Maternal Grandmother    • No Known Problems Maternal Grandfather    • No Known Problems Paternal Grandmother    • No Known Problems Paternal Grandfather    •  "No Known Problems Paternal Aunt    • Breast cancer Neg Hx      Social History     Tobacco Use   • Smoking status: Every Day     Current packs/day: 1.00     Types: Cigarettes     Passive exposure: Never   • Smokeless tobacco: Never   Vaping Use   • Vaping status: Never Used   Substance and Sexual Activity   • Alcohol use: Not Currently     Comment: social    • Drug use: No   • Sexual activity: Not Currently     Current Outpatient Medications on File Prior to Visit   Medication Sig   • ARIPiprazole (ABILIFY) 10 mg tablet 1 po qhs   • Calcium Carbonate (CALTRATE 600 PO) Take by mouth   • gabapentin (NEURONTIN) 400 mg capsule Take 2 capsules (800 mg total) by mouth 3 (three) times a day   • lamoTRIgine (LaMICtal) 25 mg tablet TAKE EIGHT TABLETS BY MOUTH AT BEDTIME   • omeprazole (PriLOSEC) 20 mg delayed release capsule Take 1 capsule (20 mg total) by mouth daily   • traZODone (DESYREL) 50 mg tablet TAKE THREE TABLETS BY MOUTH AT BEDTIME AS NEEDED     Allergies   Allergen Reactions   • Benadryl [Diphenhydramine] Hyperactivity   • Tylenol With Codeine #3 [Acetaminophen-Codeine] Hyperactivity   • Azithromycin Rash   • Erythromycin Rash     Immunization History   Administered Date(s) Administered   • INFLUENZA 11/17/2014, 12/02/2015, 01/24/2018   • Pneumococcal Conjugate Vaccine 20-valent (Pcv20), Polysace 06/09/2023     Objective     /70 (BP Location: Left arm, Patient Position: Sitting, Cuff Size: Adult)   Pulse 71   Temp 97.9 °F (36.6 °C) (Tympanic)   Resp 16   Ht 5' 7\" (1.702 m)   Wt 79.4 kg (175 lb)   SpO2 95%   BMI 27.41 kg/m²     Physical Exam  Vitals and nursing note reviewed.   Constitutional:       General: She is not in acute distress.     Appearance: She is well-developed.   HENT:      Head: Normocephalic and atraumatic.   Eyes:      Conjunctiva/sclera: Conjunctivae normal.   Cardiovascular:      Rate and Rhythm: Normal rate and regular rhythm.      Heart sounds: No murmur heard.  Pulmonary:      " Effort: Pulmonary effort is normal. No respiratory distress.      Breath sounds: Normal breath sounds.   Abdominal:      Palpations: Abdomen is soft.      Tenderness: There is no abdominal tenderness.   Musculoskeletal:         General: No swelling.      Cervical back: Neck supple.   Skin:     General: Skin is warm and dry.      Capillary Refill: Capillary refill takes less than 2 seconds.   Neurological:      Mental Status: She is alert.   Psychiatric:         Mood and Affect: Mood normal.       Administrative Statements

## 2024-06-21 NOTE — PSYCH
Virtual Regular Visit    Verification of patient location:    Patient is located at Home in the following state in which I hold an active license [place]      Assessment/Plan:    Problem List Items Addressed This Visit          Surgery/Wound/Pain    Fibromyalgia     Other Visit Diagnoses       Bipolar 1 disorder, mixed, moderate (HCC)    -  Primary    Relevant Medications    hydrOXYzine HCL (ATARAX) 50 mg tablet    Medication monitoring encounter        Anxiety        Relevant Medications    hydrOXYzine HCL (ATARAX) 50 mg tablet            Goals addressed in session: Goal 1          Reason for visit is   Chief Complaint   Patient presents with    Follow-up    Medication Management    Virtual Regular Visit          Encounter provider Clementine Campuzano PA-C      Recent Visits  Date Type Provider Dept   06/20/24 Telephone Clementine Campuzano PA-C [facility]   Showing recent visits within past 7 days and meeting all other requirements  Today's Visits  Date Type Provider Dept   06/21/24 Telemedicine Clementine Campuzano PA-C [facility]   06/21/24 Office Visit [facility] [address] [facility]   Showing today's visits and meeting all other requirements  Future Appointments  No visits were found meeting these conditions.  Showing future appointments within next 150 days and meeting all other requirements       The patient was identified by name and date of birth. Miguelina Russo was informed that this is a telemedicine visit and that the visit is being conducted throughthe Epic Embedded platform. She agrees to proceed..  My office door was closed. No one else was in the room.  She acknowledged consent and understanding of privacy and security of the video platform. The patient has agreed to participate and understands they can discontinue the visit at any time.    Patient is aware this is a billable service.     Subjective  Miguelina Russo is a 59 y.o. female with  "bipolar .      HPI   Miguelina was seen for sooner appointment due to report of feeling like her \"heart is jumping out of her chest when she is cycling.\"  At her last visit 2 weeks ago Cymbalta was reduced from 60 mg to 30 mg due to hypomania.  States that for the first week she felt better with the reduction but now she is having increased anxiety and racing thoughts during the day as she had prior to the reduction.  States that yesterday she took trazodone during the day due to significant anxiety.  States that she is sleeping well at night though and no manic symptoms other than racing thoughts.  Taking other medications as prescribed and no adverse effects noted.  Fibromyalgia pain has been slightly increased this week possibly due to increased anxiety/stress.  No suicidal or homicidal ideation.  No psychotic signs or symptoms.  Spouse remains a very good support.  Medications was reviewed and updated.    Past Medical History:   Diagnosis Date    Anxiety     Depression     GERD (gastroesophageal reflux disease)     Ovarian cyst     Urinary incontinence        Past Surgical History:   Procedure Laterality Date    BACK SURGERY      fusion    CERVICAL SPINE SURGERY      CYSTOSCOPY  03/13/2018         OOPHORECTOMY Right     [place] LAPAROSCOPY SLING OPERATION STRESS INCONT N/A 4/24/2018    Procedure: INSERTION PUBOVAGINAL SLING (FEMALE) Trans obturator mid urethral sling insertion, CYSTOSCOPY;  Surgeon: James Mesa MD;  [facility];  Service: Urology       Current Outpatient Medications   Medication Sig Dispense Refill    hydrOXYzine HCL (ATARAX) 50 mg tablet 1/2 - 1 po tid prn anxiety 90 tablet 1    ARIPiprazole (ABILIFY) 10 mg tablet 1 po qhs 90 tablet 1    Calcium Carbonate (CALTRATE 600 PO) Take by mouth      gabapentin (NEURONTIN) 400 mg capsule Take 2 capsules (800 mg total) by mouth 3 (three) times a day 540 capsule 1    lamoTRIgine (LaMICtal) 25 mg tablet TAKE EIGHT TABLETS BY MOUTH AT BEDTIME  "     omeprazole (PriLOSEC) 20 mg delayed release capsule Take 1 capsule (20 mg total) by mouth daily 90 capsule 3    traZODone (DESYREL) 50 mg tablet TAKE THREE TABLETS BY MOUTH AT BEDTIME AS NEEDED 270 tablet 1     No current facility-administered medications for this visit.        Allergies   Allergen Reactions    Benadryl [Diphenhydramine] Hyperactivity    Tylenol With Codeine #3 [Acetaminophen-Codeine] Hyperactivity    Azithromycin Rash    Erythromycin Rash       Review of Systems    Video Exam    There were no vitals filed for this visit.    Physical Exam   Mental status exam:  Speech is clear, coherent, normal rate and volume  Adequate hygiene, good eye contact  Affect is currently appropriate,   mood is labile, anxious  Linear and coherent thought process  No suicidal or homicidal ideation  No psychotic signs or symptoms noted, no hallucinations and no delusions were voiced  Cognition appears intact  Insight and judgment is intact     medications and treatment plan as follows:      D/C  Cymbalta 30 mg daily    Add hydroxyzine 50 mg 1/2 to 1 tablet 3 times a day as needed for anxiety  Continue with other medications as follows:     Abilify 10 mg at bedtime     lamotrigine 200 mg daily     Gabapentin 800 mg 3 times daily     Trazodone 50 mg 2-3 at bedtime as needed     Aware of after-hours on-call service and 9 88 crisis line    She will follow-up as previously scheduled on July 5 and call sooner if any questions or concerns    Visit Time    Visit Start Time: 230  Visit Stop Time: 250  Total Visit Duration:  20 minutes

## 2024-06-21 NOTE — ASSESSMENT & PLAN NOTE
Stable. It was discussed about low-fat diet and regular exercise. Continue to monitor fasting lipid profile.

## 2024-07-05 ENCOUNTER — TELEMEDICINE (OUTPATIENT)
Dept: PSYCHIATRY | Facility: CLINIC | Age: 59
End: 2024-07-05
Payer: COMMERCIAL

## 2024-07-05 DIAGNOSIS — M79.7 FIBROMYALGIA: ICD-10-CM

## 2024-07-05 DIAGNOSIS — F31.62 BIPOLAR 1 DISORDER, MIXED, MODERATE (HCC): Primary | ICD-10-CM

## 2024-07-05 DIAGNOSIS — F41.9 ANXIETY: ICD-10-CM

## 2024-07-05 DIAGNOSIS — Z51.81 MEDICATION MONITORING ENCOUNTER: ICD-10-CM

## 2024-07-05 PROCEDURE — 99214 OFFICE O/P EST MOD 30 MIN: CPT | Performed by: PHYSICIAN ASSISTANT

## 2024-07-05 RX ORDER — DULOXETIN HYDROCHLORIDE 20 MG/1
20 CAPSULE, DELAYED RELEASE ORAL DAILY
Qty: 30 CAPSULE | Refills: 1 | Status: SHIPPED | OUTPATIENT
Start: 2024-07-05

## 2024-07-05 NOTE — PSYCH
Virtual Regular Visit    Verification of patient location:    Patient is located at Home in the following state in which I hold an active license [place]      Assessment/Plan:    Problem List Items Addressed This Visit          Surgery/Wound/Pain    Fibromyalgia    Relevant Medications    DULoxetine (CYMBALTA) 20 mg capsule     Other Visit Diagnoses       Bipolar 1 disorder, mixed, moderate (HCC)    -  Primary    Relevant Medications    DULoxetine (CYMBALTA) 20 mg capsule    Medication monitoring encounter        Relevant Medications    DULoxetine (CYMBALTA) 20 mg capsule    Anxiety        Relevant Medications    DULoxetine (CYMBALTA) 20 mg capsule            Goals addressed in session: Goal 1          Reason for visit is   Chief Complaint   Patient presents with    Follow-up    Medication Management    Virtual Regular Visit          Encounter provider Clementine Campuzano PA-C      Recent Visits  No visits were found meeting these conditions.  Showing recent visits within past 7 days and meeting all other requirements  Today's Visits  Date Type Provider Dept   07/05/24 Telemedicine Clementine Campuzano PA-C [facility]   Showing today's visits and meeting all other requirements  Future Appointments  No visits were found meeting these conditions.  Showing future appointments within next 150 days and meeting all other requirements       The patient was identified by name and date of birth. Miguelina Pulliampilloky was informed that this is a telemedicine visit and that the visit is being conducted throughthe Epic Embedded platform. She agrees to proceed..  My office door was closed. No one else was in the room.  She acknowledged consent and understanding of privacy and security of the video platform. The patient has agreed to participate and understands they can discontinue the visit at any time.    Patient is aware this is a billable service.     Subjective  Miguelina Pulliampilloky is a 59 y.o. female with  history of bipolar disorder.      STEWART Mcintyre was seen for follow-up and medication management.  States that since her last visit her fibromyalgia pain has been excruciating.  States that about 5 days after discontinuation of 30 mg dose of Cymbalta or her pain became significantly noticeable.  States that it is disrupting her sleep at night and also causing a decrease in her appetite.  she has been feeling more nauseated due to pain which has caused a decrease in her appetite.  Taking other medication as prescribed.  Feels as though her moods have been stable though.  No manic signs or symptoms noted.  No severe depressive signs or symptoms but reports poor motivation/interest due to pain.  No other medication changes since her last visit  Discussed options and will try a lower dose of Cymbalta 20 mg daily and monitor.    Past Medical History:   Diagnosis Date    Anxiety     Depression     GERD (gastroesophageal reflux disease)     Ovarian cyst     Urinary incontinence        Past Surgical History:   Procedure Laterality Date    BACK SURGERY      fusion    CERVICAL SPINE SURGERY      CYSTOSCOPY  03/13/2018         OOPHORECTOMY Right     [place] LAPAROSCOPY SLING OPERATION STRESS INCONT N/A 4/24/2018    Procedure: INSERTION PUBOVAGINAL SLING (FEMALE) Trans obturator mid urethral sling insertion, CYSTOSCOPY;  Surgeon: James Mesa MD;  Location: [facility];  Service: Urology       Current Outpatient Medications   Medication Sig Dispense Refill    DULoxetine (CYMBALTA) 20 mg capsule Take 1 capsule (20 mg total) by mouth daily 30 capsule 1    ARIPiprazole (ABILIFY) 10 mg tablet 1 po qhs 90 tablet 1    Calcium Carbonate (CALTRATE 600 PO) Take by mouth      gabapentin (NEURONTIN) 400 mg capsule Take 2 capsules (800 mg total) by mouth 3 (three) times a day 540 capsule 1    hydrOXYzine HCL (ATARAX) 50 mg tablet 1/2 - 1 po tid prn anxiety 90 tablet 1    lamoTRIgine (LaMICtal) 25 mg tablet TAKE EIGHT TABLETS BY MOUTH  AT BEDTIME      omeprazole (PriLOSEC) 20 mg delayed release capsule Take 1 capsule (20 mg total) by mouth daily 90 capsule 3    traZODone (DESYREL) 50 mg tablet TAKE THREE TABLETS BY MOUTH AT BEDTIME AS NEEDED 270 tablet 1     No current facility-administered medications for this visit.        Allergies   Allergen Reactions    Benadryl [Diphenhydramine] Hyperactivity    Tylenol With Codeine #3 [Acetaminophen-Codeine] Hyperactivity    Azithromycin Rash    Erythromycin Rash       Review of Systems    Video Exam    There were no vitals filed for this visit.    Physical Exam   Mental status exam:  Speech is clear, coherent, normal rate and volume  Adequate hygiene, good eye contact  Affect is dysthymic  mood is congruent  Linear and coherent thought process  No suicidal or homicidal ideation  No psychotic signs or symptoms noted, no hallucinations and no delusions were voiced  Cognition appears intact  Insight and judgment is intact     Medications and treatment plan as follows:      We will restart Cymbalta at a lower dosage of 20 mg daily and monitor     hydroxyzine 50 mg 1/2 to 1 tablet 3 times a day as needed for anxiety     Abilify 10 mg at bedtime     lamotrigine 200 mg daily     Gabapentin 800 mg 3 times daily     Trazodone 50 mg 2-3 at bedtime as needed     Aware of after-hours on-call service and 9 [phone] crisis line     She will follow-up in about 1 month and call me sooner if any questions or concerns    Visit Time    Visit Start Time: 856  Visit Stop Time: 910  Total Visit Duration:  14 minutes

## 2024-07-18 NOTE — H&P (VIEW-ONLY)
Miguelina Russo is a 59 y.o.female who is here for :    Biliary colic with stones in March 2024     Cholelithiasis with no signs of acute cholecystitis.   And Echogenic lesion in the right lobe of the liver likely hemangiom    Currently:     Assessment/Plan:   Miguelina Russo is a 59 y.o.female who is here for possible Gallbladder disease.     Upon evaluation today patient has: Bilary colic and Chronic Cholecystitis    Plan: Laparoscopic Cholecystectomy with possible Intraoperative Cholangiogram  Possible Robotic assisted      2.  Screening colonoscopy.  Asymptomatic no family history.  Was done on June 4, 2024 multiple polyps were found.  Generally these were all hyperplastic polyps it is likely she can go to at least a 5 to 7-year follow-up if not ten years       Post Op Pain Management: Norco    3.  MRI shows IPMN, 1.3 cm.    Yearly follow-up with MRI MRCP recommended for 5 years.  To continue to see surgery on a yearly basis for this. PT is aware         Ultrasound findings: Multiple large gallstones measuring up to 1.7 cm, 18 cm liver       Body mass index is 27.97 kg/m².     Preoperative Clearance: None    Patient history of stage III chronic kidney disease and major depressive disorder panic disorder, seemingly well-controlled.  Images:       Patient understands hernia occurrence or re-occurrence risk is higher in a diabetic, tobacco user, with elevated BMIs.     Patient was discussed and asked her medication list.    Discussion was held regarding anticoagulation, aspirin and Motrin use prior to surgery.  Most anticoagulation needs to stop 7 to 10 days prior to surgery.  Some can be stopped at 48 hours.  Patient has had all questions answered by the physician or physician representative regarding anticoagulation.  Patient is aware that they need to stop their anticoagulation preoperatively.    Discussion was held regarding weight loss medication and diabetic medication with that also is used for weight  loss.  These medications have significant perioperative risk.  He has medications need to be stopped 7 to 10 days prior to surgery.  Patient understands and is aware they need to stop these type of medications preoperatively.    Patient has been given a list of anticoagulation and/or weight loss/diabetes medications that would be affected by this and they have confirmed the are either not on these drugs or have understood their directions to stop them at least 1 week prior to surgery or as instructed.    Most herbal medication should be discontinued a week to 10 days prior to surgery.    Diabetic medication such as insulin should be discussed with her primary care physician or the physician that ordered the insulin or similar medications.  In general patients usually half their diabetic insulin the morning of the surgery but again this should be discussed with the physician.    Patient understands and agrees to this aforementioned protocol.          ____________________________________________________    HPI:  Miguelina Pulliampilloky is a 59 y.o.female who was referred for evaluation of The primary encounter diagnosis was Gallstones. Diagnoses of Stage 3a chronic kidney disease (HCC), Biliary colic, and IPMN (intraductal papillary mucinous neoplasm) were also pertinent to this visit.      Abdominal pain Location: in the RUQ without radiation, which is Intermittent  and over 3 months     no nausea and no vomiting,     regular bowel movement.     Duration of pain or symptoms: over 1 month  History of depression and bipolar.  No significant other medical history than this.  Should be cleared for surgery.  No previous colonoscopy, no family history and no blood in her stools    Prior Colonoscopy : None     Prior Abdominal Surgery: Pfannenstiel incision for previous ovarian    Anticoagulation: none    A1c:     Smoking Status: Non-smoker     Imaging:   MRI abdomen w wo contrast and mrcp    (Results Pending)           LABS:   "  Lab Results   Component Value Date    K 4.8 03/09/2024     03/09/2024    CO2 31 03/09/2024    BUN 15 03/09/2024    CREATININE 0.87 03/09/2024    GLUF 97 03/09/2024    CALCIUM 9.4 03/09/2024    AST 14 03/09/2024    ALT 14 03/09/2024    ALKPHOS 64 03/09/2024    EGFR 72 03/09/2024       Lab Results   Component Value Date    WBC 5.79 03/09/2024    HGB 14.7 03/09/2024    HCT 44.7 03/09/2024    MCV 99 (H) 03/09/2024     03/09/2024     No results found for: \"INR\", \"PROTIME\"  Lab Results   Component Value Date    HGBA1C 5.5 03/09/2024           ROS:  General ROS: negative for - chills, fatigue, fever or night sweats, weight loss  Respiratory ROS: no cough, shortness of breath, or wheezing  Cardiovascular ROS: no chest pain or dyspnea on exertion  Genito-Urinary ROS: no dysuria, trouble voiding, or hematuria  Musculoskeletal ROS: negative for - gait disturbance, joint pain or muscle pain  Neurological ROS: no TIA or stroke symptoms  Gastrointestinal ROS: See HPI   GI ROS: see HPI  Skin ROS: no new rashes or lesions   Lymphatic ROS: no new adenopathy noted by pt.   GYN ROS: see HPI, no new GYN history or bleeding noted  Psy ROS: no new mental or behavioral disturbances       Patient Active Problem List   Diagnosis    Stress incontinence of urine    Bipolar 1 disorder (HCC)    Fibromyalgia    Cellulitis of right lower extremity    Chronic bilateral low back pain without sciatica    MDD (major depressive disorder), recurrent severe, without psychosis (HCC)    Panic disorder    Other hyperlipidemia    Asymptomatic menopause    Vitamin D insufficiency    Stage 3a chronic kidney disease (HCC)    Hyperglycemia    Epigastric pain    RUQ pain         Allergies:  Benadryl [diphenhydramine], Tylenol with codeine #3 [acetaminophen-codeine], Azithromycin, and Erythromycin      Current Outpatient Medications:     Calcium Carbonate (CALTRATE 600 PO), Take by mouth, Disp: , Rfl:     DULoxetine (CYMBALTA) 20 mg capsule, Take 1 " capsule (20 mg total) by mouth daily, Disp: 30 capsule, Rfl: 1    gabapentin (NEURONTIN) 400 mg capsule, Take 2 capsules (800 mg total) by mouth 3 (three) times a day, Disp: 540 capsule, Rfl: 1    hydrOXYzine HCL (ATARAX) 50 mg tablet, 1/2 - 1 po tid prn anxiety, Disp: 90 tablet, Rfl: 1    lamoTRIgine (LaMICtal) 25 mg tablet, TAKE EIGHT TABLETS BY MOUTH AT BEDTIME, Disp: , Rfl:     omeprazole (PriLOSEC) 20 mg delayed release capsule, Take 1 capsule (20 mg total) by mouth daily, Disp: 90 capsule, Rfl: 3    traZODone (DESYREL) 50 mg tablet, TAKE THREE TABLETS BY MOUTH AT BEDTIME AS NEEDED, Disp: 270 tablet, Rfl: 1    ARIPiprazole (ABILIFY) 10 mg tablet, 1 po qhs (Patient not taking: Reported on 7/22/2024), Disp: 90 tablet, Rfl: 1    Past Medical History:   Diagnosis Date    Anxiety     Depression     GERD (gastroesophageal reflux disease)     Ovarian cyst     Urinary incontinence        Past Surgical History:   Procedure Laterality Date    BACK SURGERY      fusion    CERVICAL SPINE SURGERY      CYSTOSCOPY  03/13/2018         OOPHORECTOMY Right     [place] LAPAROSCOPY SLING OPERATION STRESS INCONT N/A 4/24/2018    Procedure: INSERTION PUBOVAGINAL SLING (FEMALE) Trans obturator mid urethral sling insertion, CYSTOSCOPY;  Surgeon: James Mesa MD;  Location:  [facility] OR;  Service: Urology       Family History   Problem Relation Age of Onset    No Known Problems Mother     No Known Problems Father     No Known Problems Sister     No Known Problems Sister     No Known Problems Daughter     No Known Problems Daughter     No Known Problems Daughter     No Known Problems Maternal Grandmother     No Known Problems Maternal Grandfather     No Known Problems Paternal Grandmother     No Known Problems Paternal Grandfather     No Known Problems Paternal Aunt     Breast cancer Neg Hx         reports that she has been smoking cigarettes. She has never been exposed to tobacco smoke. She has never used smokeless tobacco. She  "reports that she does not currently use alcohol. She reports that she does not use drugs.      Exam:   Vitals:    07/22/24 1135   BP: 112/70   Pulse: 75   Resp: 17   SpO2: 97%         PHYSICAL EXAM  General Appearance:    Alert, cooperative, no distress,    Head:    Normocephalic without obvious abnormality   Eyes:    PERRL, conjunctiva/corneas clear,       Neck:   Supple, no adenopathy, no JVD   Back:     Symmetric, no spinal or CVA tenderness   Lungs:     Clear to auscultation bilaterally, no wheezing or rhonchi   Heart:    Regular rate and rhythm, S1 and S2 normal, no murmur   Abdomen:     abdomen is soft without significant tenderness, masses, organomegaly or guarding Bowel sounds are normal.     Extremities:   Extremities normal. No clubbing, cyanosis or edema   Psych:   Normal Affect, AOx3.    Neurologic:  Skin:   CNII-XII intact. Strength symmetric, speech intact    Warm, dry, intact, no visible rashes or lesions      Informed consent for procedure was personally discussed, reviewed, and signed by Dr. Cisneros. Discussion by Dr. Cisneros was carried out regarding risks, benefits, and alternatives with the patient. Risks include but are not limited to:  bleeding, infection, and delayed wound healing or an open wound, pulmonary embolus, leaks from bowel or bile ducts or other viscus, transfusions, death.  Discussed in further detail the more common complications and their rates of occurrence.   was used if necessary.  Patient expressed understanding of the issues discussed and wished/consented to proceed.  All questions were answered by Dr. Cisneros.    Discussion performed between patient and the provider signing below.     Signature:   Mariya Cisneros MD    Date: 7/22/2024 Time: 11:49 AM                          Some portions of this record may have been generated with voice recognition software. There may be translation, syntax,  or grammatical errors. Occasional wrong word or \"sound-a-like\" " substitutions may have occurred due to the inherent limitations of the voice recognition software. Read the chart carefully and recognize, using context, where substitutions may have occurred. If you have any questions, please contact the dictating provider for clarification or correction, as needed. This encounter has been coded by a non-certified coder.

## 2024-07-18 NOTE — PROGRESS NOTES
Miguelina Russo is a 59 y.o.female who is here for :    Biliary colic with stones in March 2024     Cholelithiasis with no signs of acute cholecystitis.   And Echogenic lesion in the right lobe of the liver likely hemangiom    Currently:     Assessment/Plan:   Miguelina Russo is a 59 y.o.female who is here for possible Gallbladder disease.     Upon evaluation today patient has: Bilary colic and Chronic Cholecystitis    Plan: Laparoscopic Cholecystectomy with possible Intraoperative Cholangiogram  Possible Robotic assisted      2.  Screening colonoscopy.  Asymptomatic no family history.  Was done on June 4, 2024 multiple polyps were found.  Generally these were all hyperplastic polyps it is likely she can go to at least a 5 to 7-year follow-up if not ten years       Post Op Pain Management: Norco    3.  MRI shows IPMN, 1.3 cm.    Yearly follow-up with MRI MRCP recommended for 5 years.  To continue to see surgery on a yearly basis for this. PT is aware         Ultrasound findings: Multiple large gallstones measuring up to 1.7 cm, 18 cm liver       Body mass index is 27.97 kg/m².     Preoperative Clearance: None    Patient history of stage III chronic kidney disease and major depressive disorder panic disorder, seemingly well-controlled.  Images:       Patient understands hernia occurrence or re-occurrence risk is higher in a diabetic, tobacco user, with elevated BMIs.     Patient was discussed and asked her medication list.    Discussion was held regarding anticoagulation, aspirin and Motrin use prior to surgery.  Most anticoagulation needs to stop 7 to 10 days prior to surgery.  Some can be stopped at 48 hours.  Patient has had all questions answered by the physician or physician representative regarding anticoagulation.  Patient is aware that they need to stop their anticoagulation preoperatively.    Discussion was held regarding weight loss medication and diabetic medication with that also is used for weight  loss.  These medications have significant perioperative risk.  He has medications need to be stopped 7 to 10 days prior to surgery.  Patient understands and is aware they need to stop these type of medications preoperatively.    Patient has been given a list of anticoagulation and/or weight loss/diabetes medications that would be affected by this and they have confirmed the are either not on these drugs or have understood their directions to stop them at least 1 week prior to surgery or as instructed.    Most herbal medication should be discontinued a week to 10 days prior to surgery.    Diabetic medication such as insulin should be discussed with her primary care physician or the physician that ordered the insulin or similar medications.  In general patients usually half their diabetic insulin the morning of the surgery but again this should be discussed with the physician.    Patient understands and agrees to this aforementioned protocol.          ____________________________________________________    HPI:  Miguelina Pulliampilloky is a 59 y.o.female who was referred for evaluation of The primary encounter diagnosis was Gallstones. Diagnoses of Stage 3a chronic kidney disease (HCC), Biliary colic, and IPMN (intraductal papillary mucinous neoplasm) were also pertinent to this visit.      Abdominal pain Location: in the RUQ without radiation, which is Intermittent  and over 3 months     no nausea and no vomiting,     regular bowel movement.     Duration of pain or symptoms: over 1 month  History of depression and bipolar.  No significant other medical history than this.  Should be cleared for surgery.  No previous colonoscopy, no family history and no blood in her stools    Prior Colonoscopy : None     Prior Abdominal Surgery: Pfannenstiel incision for previous ovarian    Anticoagulation: none    A1c:     Smoking Status: Non-smoker     Imaging:   MRI abdomen w wo contrast and mrcp    (Results Pending)           LABS:   "  Lab Results   Component Value Date    K 4.8 03/09/2024     03/09/2024    CO2 31 03/09/2024    BUN 15 03/09/2024    CREATININE 0.87 03/09/2024    GLUF 97 03/09/2024    CALCIUM 9.4 03/09/2024    AST 14 03/09/2024    ALT 14 03/09/2024    ALKPHOS 64 03/09/2024    EGFR 72 03/09/2024       Lab Results   Component Value Date    WBC 5.79 03/09/2024    HGB 14.7 03/09/2024    HCT 44.7 03/09/2024    MCV 99 (H) 03/09/2024     03/09/2024     No results found for: \"INR\", \"PROTIME\"  Lab Results   Component Value Date    HGBA1C 5.5 03/09/2024           ROS:  General ROS: negative for - chills, fatigue, fever or night sweats, weight loss  Respiratory ROS: no cough, shortness of breath, or wheezing  Cardiovascular ROS: no chest pain or dyspnea on exertion  Genito-Urinary ROS: no dysuria, trouble voiding, or hematuria  Musculoskeletal ROS: negative for - gait disturbance, joint pain or muscle pain  Neurological ROS: no TIA or stroke symptoms  Gastrointestinal ROS: See HPI   GI ROS: see HPI  Skin ROS: no new rashes or lesions   Lymphatic ROS: no new adenopathy noted by pt.   GYN ROS: see HPI, no new GYN history or bleeding noted  Psy ROS: no new mental or behavioral disturbances       Patient Active Problem List   Diagnosis    Stress incontinence of urine    Bipolar 1 disorder (HCC)    Fibromyalgia    Cellulitis of right lower extremity    Chronic bilateral low back pain without sciatica    MDD (major depressive disorder), recurrent severe, without psychosis (HCC)    Panic disorder    Other hyperlipidemia    Asymptomatic menopause    Vitamin D insufficiency    Stage 3a chronic kidney disease (HCC)    Hyperglycemia    Epigastric pain    RUQ pain         Allergies:  Benadryl [diphenhydramine], Tylenol with codeine #3 [acetaminophen-codeine], Azithromycin, and Erythromycin      Current Outpatient Medications:     Calcium Carbonate (CALTRATE 600 PO), Take by mouth, Disp: , Rfl:     DULoxetine (CYMBALTA) 20 mg capsule, Take 1 " capsule (20 mg total) by mouth daily, Disp: 30 capsule, Rfl: 1    gabapentin (NEURONTIN) 400 mg capsule, Take 2 capsules (800 mg total) by mouth 3 (three) times a day, Disp: 540 capsule, Rfl: 1    hydrOXYzine HCL (ATARAX) 50 mg tablet, 1/2 - 1 po tid prn anxiety, Disp: 90 tablet, Rfl: 1    lamoTRIgine (LaMICtal) 25 mg tablet, TAKE EIGHT TABLETS BY MOUTH AT BEDTIME, Disp: , Rfl:     omeprazole (PriLOSEC) 20 mg delayed release capsule, Take 1 capsule (20 mg total) by mouth daily, Disp: 90 capsule, Rfl: 3    traZODone (DESYREL) 50 mg tablet, TAKE THREE TABLETS BY MOUTH AT BEDTIME AS NEEDED, Disp: 270 tablet, Rfl: 1    ARIPiprazole (ABILIFY) 10 mg tablet, 1 po qhs (Patient not taking: Reported on 7/22/2024), Disp: 90 tablet, Rfl: 1    Past Medical History:   Diagnosis Date    Anxiety     Depression     GERD (gastroesophageal reflux disease)     Ovarian cyst     Urinary incontinence        Past Surgical History:   Procedure Laterality Date    BACK SURGERY      fusion    CERVICAL SPINE SURGERY      CYSTOSCOPY  03/13/2018         OOPHORECTOMY Right     NC LAPAROSCOPY SLING OPERATION STRESS INCONT N/A 4/24/2018    Procedure: INSERTION PUBOVAGINAL SLING (FEMALE) Trans obturator mid urethral sling insertion, CYSTOSCOPY;  Surgeon: James Mesa MD;  Location:  MAIN OR;  Service: Urology       Family History   Problem Relation Age of Onset    No Known Problems Mother     No Known Problems Father     No Known Problems Sister     No Known Problems Sister     No Known Problems Daughter     No Known Problems Daughter     No Known Problems Daughter     No Known Problems Maternal Grandmother     No Known Problems Maternal Grandfather     No Known Problems Paternal Grandmother     No Known Problems Paternal Grandfather     No Known Problems Paternal Aunt     Breast cancer Neg Hx         reports that she has been smoking cigarettes. She has never been exposed to tobacco smoke. She has never used smokeless tobacco. She  "reports that she does not currently use alcohol. She reports that she does not use drugs.      Exam:   Vitals:    07/22/24 1135   BP: 112/70   Pulse: 75   Resp: 17   SpO2: 97%         PHYSICAL EXAM  General Appearance:    Alert, cooperative, no distress,    Head:    Normocephalic without obvious abnormality   Eyes:    PERRL, conjunctiva/corneas clear,       Neck:   Supple, no adenopathy, no JVD   Back:     Symmetric, no spinal or CVA tenderness   Lungs:     Clear to auscultation bilaterally, no wheezing or rhonchi   Heart:    Regular rate and rhythm, S1 and S2 normal, no murmur   Abdomen:     abdomen is soft without significant tenderness, masses, organomegaly or guarding Bowel sounds are normal.     Extremities:   Extremities normal. No clubbing, cyanosis or edema   Psych:   Normal Affect, AOx3.    Neurologic:  Skin:   CNII-XII intact. Strength symmetric, speech intact    Warm, dry, intact, no visible rashes or lesions      Informed consent for procedure was personally discussed, reviewed, and signed by Dr. Cisneros. Discussion by Dr. Cisneros was carried out regarding risks, benefits, and alternatives with the patient. Risks include but are not limited to:  bleeding, infection, and delayed wound healing or an open wound, pulmonary embolus, leaks from bowel or bile ducts or other viscus, transfusions, death.  Discussed in further detail the more common complications and their rates of occurrence.   was used if necessary.  Patient expressed understanding of the issues discussed and wished/consented to proceed.  All questions were answered by Dr. Cisneros.    Discussion performed between patient and the provider signing below.     Signature:   Mariya Cisneros MD    Date: 7/22/2024 Time: 11:49 AM                          Some portions of this record may have been generated with voice recognition software. There may be translation, syntax,  or grammatical errors. Occasional wrong word or \"sound-a-like\" " substitutions may have occurred due to the inherent limitations of the voice recognition software. Read the chart carefully and recognize, using context, where substitutions may have occurred. If you have any questions, please contact the dictating provider for clarification or correction, as needed. This encounter has been coded by a non-certified coder.

## 2024-07-21 PROBLEM — Z00.00 MEDICARE ANNUAL WELLNESS VISIT, SUBSEQUENT: Status: RESOLVED | Noted: 2023-06-11 | Resolved: 2024-07-21

## 2024-07-22 ENCOUNTER — OFFICE VISIT (OUTPATIENT)
Dept: SURGERY | Facility: CLINIC | Age: 59
End: 2024-07-22
Payer: COMMERCIAL

## 2024-07-22 VITALS
HEART RATE: 75 BPM | BODY MASS INDEX: 28.03 KG/M2 | WEIGHT: 178.6 LBS | SYSTOLIC BLOOD PRESSURE: 112 MMHG | HEIGHT: 67 IN | RESPIRATION RATE: 17 BRPM | DIASTOLIC BLOOD PRESSURE: 70 MMHG | OXYGEN SATURATION: 97 %

## 2024-07-22 DIAGNOSIS — D49.0 IPMN (INTRADUCTAL PAPILLARY MUCINOUS NEOPLASM): ICD-10-CM

## 2024-07-22 DIAGNOSIS — K80.50 BILIARY COLIC: ICD-10-CM

## 2024-07-22 DIAGNOSIS — N18.31 STAGE 3A CHRONIC KIDNEY DISEASE (HCC): ICD-10-CM

## 2024-07-22 DIAGNOSIS — K80.20 GALLSTONES: Primary | ICD-10-CM

## 2024-07-22 PROCEDURE — 99214 OFFICE O/P EST MOD 30 MIN: CPT | Performed by: SURGERY

## 2024-07-31 ENCOUNTER — ANESTHESIA EVENT (OUTPATIENT)
Dept: PERIOP | Facility: HOSPITAL | Age: 59
End: 2024-07-31
Payer: COMMERCIAL

## 2024-07-31 DIAGNOSIS — F31.62 BIPOLAR 1 DISORDER, MIXED, MODERATE (HCC): ICD-10-CM

## 2024-07-31 DIAGNOSIS — F41.9 ANXIETY: ICD-10-CM

## 2024-07-31 DIAGNOSIS — M79.7 FIBROMYALGIA: ICD-10-CM

## 2024-07-31 DIAGNOSIS — Z51.81 MEDICATION MONITORING ENCOUNTER: ICD-10-CM

## 2024-07-31 RX ORDER — DULOXETIN HYDROCHLORIDE 20 MG/1
20 CAPSULE, DELAYED RELEASE ORAL DAILY
Qty: 30 CAPSULE | Refills: 1 | Status: SHIPPED | OUTPATIENT
Start: 2024-07-31 | End: 2024-08-23

## 2024-08-04 DIAGNOSIS — F31.62 BIPOLAR 1 DISORDER, MIXED, MODERATE (HCC): ICD-10-CM

## 2024-08-05 RX ORDER — ARIPIPRAZOLE 10 MG/1
TABLET ORAL
Qty: 30 TABLET | Refills: 1 | Status: SHIPPED | OUTPATIENT
Start: 2024-08-05

## 2024-08-05 NOTE — PRE-PROCEDURE INSTRUCTIONS
Pre-Surgery Instructions:   Medication Instructions    Calcium Carbonate (CALTRATE 600 PO) Hold day of surgery.    DULoxetine (CYMBALTA) 20 mg capsule Take day of surgery.    gabapentin (NEURONTIN) 400 mg capsule Take day of surgery.    hydrOXYzine HCL (ATARAX) 50 mg tablet Uses PRN- OK to take day of surgery    lamoTRIgine (LaMICtal) 25 mg tablet Take night before surgery    omeprazole (PriLOSEC) 20 mg delayed release capsule Take day of surgery.    traZODone (DESYREL) 50 mg tablet Take night before surgery   Medication instructions for day surgery reviewed. Please use only a sip of water to take your instructed medications. Avoid all over the counter vitamins, supplements and NSAIDS for one week prior to surgery per anesthesia guidelines. Tylenol is ok to take as needed.     You will receive a call one business day prior to surgery with an arrival time and hospital directions. If your surgery is scheduled on a Monday, the hospital will be calling you on the Friday prior to your surgery. If you have not heard from anyone by 8pm, please call the hospital supervisor through the hospital  at [phone]. ([place] [phone] or [place] [phone]).    Do not eat or drink anything after midnight the night before your surgery, including candy, mints, lifesavers, or chewing gum. Do not drink alcohol 24hrs before your surgery. Try not to smoke at least 24hrs before your surgery.       Follow the pre surgery showering instructions as listed in the “My Surgical Experience Booklet” or otherwise provided by your surgeon's office. Do not use a blade to shave the surgical area 1 week before surgery. It is okay to use a clean electric clippers up to 24 hours before surgery. Do not apply any lotions, creams, including makeup, cologne, deodorant, or perfumes after showering on the day of your surgery. Do not use dry shampoo, hair spray, hair gel, or any type of hair products.     No contact lenses, eye make-up, or  artificial eyelashes. Remove nail polish, including gel polish, and any artificial, gel, or acrylic nails if possible. Remove all jewelry including rings and body piercing jewelry.     Wear causal clothing that is easy to take on and off. Consider your type of surgery.    Keep any valuables, jewelry, piercings at home. Please bring any specially ordered equipment (sling, braces) if indicated.    Arrange for a responsible person to drive you to and from the hospital on the day of your surgery. Please confirm the visitor policy for the day of your procedure when you receive your phone call with an arrival time.     Call the surgeon's office with any new illnesses, exposures, or additional questions prior to surgery.    Please reference your “My Surgical Experience Booklet” for additional information to prepare for your upcoming surgery.

## 2024-08-05 NOTE — TELEPHONE ENCOUNTER
For covering provider's review. Miguelina has an appointment scheduled with Clementine Campuzano on 8/23.

## 2024-08-13 DIAGNOSIS — F41.9 ANXIETY: ICD-10-CM

## 2024-08-13 DIAGNOSIS — F31.62 BIPOLAR 1 DISORDER, MIXED, MODERATE (HCC): ICD-10-CM

## 2024-08-13 RX ORDER — HYDROXYZINE HYDROCHLORIDE 50 MG/1
TABLET, FILM COATED ORAL
Qty: 90 TABLET | Refills: 1 | Status: SHIPPED | OUTPATIENT
Start: 2024-08-13

## 2024-08-13 NOTE — ANESTHESIA PREPROCEDURE EVALUATION
Procedure:  ADE LAPAROSCOPIC W/ ROBOTICS (Abdomen)    Relevant Problems   ANESTHESIA (within normal limits)      CARDIO  Left Ventricle   Left ventricle is normal in size. Wall thickness is normal. Systolic function is normal with an ejection fraction of 60-65%. Wall motion is within normal limits. There is normal diastolic function.     Right Ventricle   Right ventricle cavity is normal. Systolic function is normal.     Left Atrium   Left atrium cavity size is normal.     Right Atrium   Right atrium cavity is normal.     IVC/SVC   The inferior vena cava demonstrates a diameter of <=21 mm and collapses >50%; therefore, the right atrial pressure is estimated at 0-5 mmHg.     Mitral Valve   Mitral valve structure is normal. There is no regurgitation or stenosis.     Tricuspid Valve   Tricuspid valve structure is normal. There is trace regurgitation. There is no evidence of tricuspid valve stenosis.     Aortic Valve   The aortic valve is trileaflet. There is no regurgitation or stenosis.      (+) Other hyperlipidemia      /RENAL   (+) Stage 3a chronic kidney disease (HCC)      MUSCULOSKELETAL   (+) Chronic bilateral low back pain without sciatica   (+) Fibromyalgia      NEURO/PSYCH   (+) Chronic bilateral low back pain without sciatica   (+) Fibromyalgia   (+) MDD (major depressive disorder), recurrent severe, without psychosis (HCC)   (+) Panic disorder      PULMONARY (within normal limits)      Behavioral Health   (+) Bipolar 1 disorder (HCC)      Urinary   (+) Stress incontinence of urine      Surgery/Wound/Pain   (+) Epigastric pain        Physical Exam    Airway    Mallampati score: II         Dental   No notable dental hx     Cardiovascular  Cardiovascular exam normal    Pulmonary  Pulmonary exam normal     Other Findings  post-pubertal.      Anesthesia Plan  ASA Score- 2     Anesthesia Type- general with ASA Monitors.         Additional Monitors:     Airway Plan: ETT.           Plan Factors-    Chart  reviewed.   Existing labs reviewed. Patient summary reviewed.    Patient is not a current smoker.              Induction- intravenous.    Postoperative Plan-         Informed Consent- Anesthetic plan and risks discussed with patient.

## 2024-08-14 ENCOUNTER — ANESTHESIA (OUTPATIENT)
Dept: PERIOP | Facility: HOSPITAL | Age: 59
End: 2024-08-14
Payer: COMMERCIAL

## 2024-08-14 ENCOUNTER — HOSPITAL ENCOUNTER (OUTPATIENT)
Facility: HOSPITAL | Age: 59
Setting detail: OUTPATIENT SURGERY
Discharge: HOME | End: 2024-08-14
Attending: SURGERY | Admitting: SURGERY
Payer: COMMERCIAL

## 2024-08-14 ENCOUNTER — APPOINTMENT (OUTPATIENT)
Dept: RADIOLOGY | Facility: HOSPITAL | Age: 59
End: 2024-08-14
Payer: COMMERCIAL

## 2024-08-14 VITALS
BODY MASS INDEX: 28.55 KG/M2 | OXYGEN SATURATION: 90 % | RESPIRATION RATE: 18 BRPM | HEART RATE: 74 BPM | DIASTOLIC BLOOD PRESSURE: 65 MMHG | TEMPERATURE: 98.5 F | WEIGHT: 181.88 LBS | SYSTOLIC BLOOD PRESSURE: 99 MMHG | HEIGHT: 67 IN

## 2024-08-14 DIAGNOSIS — K80.20 GALLSTONES: Primary | ICD-10-CM

## 2024-08-14 PROCEDURE — 88304 TISSUE EXAM BY PATHOLOGIST: CPT | Performed by: PATHOLOGY

## 2024-08-14 PROCEDURE — 47562 LAPAROSCOPIC CHOLECYSTECTOMY: CPT | Performed by: SURGERY

## 2024-08-14 PROCEDURE — 47562 LAPAROSCOPIC CHOLECYSTECTOMY: CPT | Performed by: PHYSICIAN ASSISTANT

## 2024-08-14 PROCEDURE — S2900 ROBOTIC SURGICAL SYSTEM: HCPCS | Performed by: SURGERY

## 2024-08-14 RX ORDER — HYDROMORPHONE HCL/PF 1 MG/ML
0.5 SYRINGE (ML) INJECTION
Status: DISCONTINUED | OUTPATIENT
Start: 2024-08-14 | End: 2024-08-14 | Stop reason: HOSPADM

## 2024-08-14 RX ORDER — MIDAZOLAM HYDROCHLORIDE 2 MG/2ML
INJECTION, SOLUTION INTRAMUSCULAR; INTRAVENOUS AS NEEDED
Status: DISCONTINUED | OUTPATIENT
Start: 2024-08-14 | End: 2024-08-14

## 2024-08-14 RX ORDER — INDOCYANINE GREEN AND WATER 25 MG
7.5 KIT INJECTION ONCE
Status: COMPLETED | OUTPATIENT
Start: 2024-08-14 | End: 2024-08-14

## 2024-08-14 RX ORDER — DEXAMETHASONE SODIUM PHOSPHATE 10 MG/ML
INJECTION, SOLUTION INTRAMUSCULAR; INTRAVENOUS AS NEEDED
Status: DISCONTINUED | OUTPATIENT
Start: 2024-08-14 | End: 2024-08-14

## 2024-08-14 RX ORDER — ONDANSETRON 2 MG/ML
4 INJECTION INTRAMUSCULAR; INTRAVENOUS ONCE AS NEEDED
Status: DISCONTINUED | OUTPATIENT
Start: 2024-08-14 | End: 2024-08-14 | Stop reason: HOSPADM

## 2024-08-14 RX ORDER — OXYCODONE HYDROCHLORIDE 5 MG/1
5 TABLET ORAL EVERY 6 HOURS PRN
Qty: 10 TABLET | Refills: 0 | Status: SHIPPED | OUTPATIENT
Start: 2024-08-14 | End: 2024-08-17

## 2024-08-14 RX ORDER — CEFAZOLIN SODIUM 2 G/50ML
2000 SOLUTION INTRAVENOUS ONCE
Status: COMPLETED | OUTPATIENT
Start: 2024-08-14 | End: 2024-08-14

## 2024-08-14 RX ORDER — GLYCOPYRROLATE 0.2 MG/ML
INJECTION INTRAMUSCULAR; INTRAVENOUS AS NEEDED
Status: DISCONTINUED | OUTPATIENT
Start: 2024-08-14 | End: 2024-08-14

## 2024-08-14 RX ORDER — FENTANYL CITRATE/PF 50 MCG/ML
25 SYRINGE (ML) INJECTION
Status: DISCONTINUED | OUTPATIENT
Start: 2024-08-14 | End: 2024-08-14 | Stop reason: HOSPADM

## 2024-08-14 RX ORDER — ROCURONIUM BROMIDE 10 MG/ML
INJECTION, SOLUTION INTRAVENOUS AS NEEDED
Status: DISCONTINUED | OUTPATIENT
Start: 2024-08-14 | End: 2024-08-14

## 2024-08-14 RX ORDER — SODIUM CHLORIDE, SODIUM LACTATE, POTASSIUM CHLORIDE, CALCIUM CHLORIDE 600; 310; 30; 20 MG/100ML; MG/100ML; MG/100ML; MG/100ML
INJECTION, SOLUTION INTRAVENOUS CONTINUOUS PRN
Status: DISCONTINUED | OUTPATIENT
Start: 2024-08-14 | End: 2024-08-14

## 2024-08-14 RX ORDER — NEOSTIGMINE METHYLSULFATE 1 MG/ML
INJECTION INTRAVENOUS AS NEEDED
Status: DISCONTINUED | OUTPATIENT
Start: 2024-08-14 | End: 2024-08-14

## 2024-08-14 RX ORDER — SODIUM CHLORIDE, SODIUM LACTATE, POTASSIUM CHLORIDE, CALCIUM CHLORIDE 600; 310; 30; 20 MG/100ML; MG/100ML; MG/100ML; MG/100ML
125 INJECTION, SOLUTION INTRAVENOUS CONTINUOUS
Status: DISCONTINUED | OUTPATIENT
Start: 2024-08-14 | End: 2024-08-14 | Stop reason: HOSPADM

## 2024-08-14 RX ORDER — LIDOCAINE HYDROCHLORIDE 20 MG/ML
INJECTION, SOLUTION EPIDURAL; INFILTRATION; INTRACAUDAL; PERINEURAL AS NEEDED
Status: DISCONTINUED | OUTPATIENT
Start: 2024-08-14 | End: 2024-08-14

## 2024-08-14 RX ORDER — ACETAMINOPHEN 325 MG/1
975 TABLET ORAL EVERY 8 HOURS PRN
Status: DISCONTINUED | OUTPATIENT
Start: 2024-08-14 | End: 2024-08-14 | Stop reason: HOSPADM

## 2024-08-14 RX ORDER — ONDANSETRON 2 MG/ML
INJECTION INTRAMUSCULAR; INTRAVENOUS AS NEEDED
Status: DISCONTINUED | OUTPATIENT
Start: 2024-08-14 | End: 2024-08-14

## 2024-08-14 RX ORDER — MEPERIDINE HYDROCHLORIDE 25 MG/ML
12.5 INJECTION INTRAMUSCULAR; INTRAVENOUS; SUBCUTANEOUS
Status: DISCONTINUED | OUTPATIENT
Start: 2024-08-14 | End: 2024-08-14 | Stop reason: HOSPADM

## 2024-08-14 RX ORDER — FENTANYL CITRATE 50 UG/ML
INJECTION, SOLUTION INTRAMUSCULAR; INTRAVENOUS AS NEEDED
Status: DISCONTINUED | OUTPATIENT
Start: 2024-08-14 | End: 2024-08-14

## 2024-08-14 RX ORDER — PROPOFOL 10 MG/ML
INJECTION, EMULSION INTRAVENOUS AS NEEDED
Status: DISCONTINUED | OUTPATIENT
Start: 2024-08-14 | End: 2024-08-14

## 2024-08-14 RX ADMIN — DEXAMETHASONE SODIUM PHOSPHATE 10 MG: 10 INJECTION INTRAMUSCULAR; INTRAVENOUS at 08:07

## 2024-08-14 RX ADMIN — SODIUM CHLORIDE, SODIUM LACTATE, POTASSIUM CHLORIDE, AND CALCIUM CHLORIDE 125 ML/HR: .6; .31; .03; .02 INJECTION, SOLUTION INTRAVENOUS at 05:43

## 2024-08-14 RX ADMIN — SODIUM CHLORIDE, SODIUM LACTATE, POTASSIUM CHLORIDE, AND CALCIUM CHLORIDE: .6; .31; .03; .02 INJECTION, SOLUTION INTRAVENOUS at 09:01

## 2024-08-14 RX ADMIN — ACETAMINOPHEN 975 MG: 325 TABLET ORAL at 11:23

## 2024-08-14 RX ADMIN — FENTANYL CITRATE 50 MCG: 50 INJECTION INTRAMUSCULAR; INTRAVENOUS at 08:34

## 2024-08-14 RX ADMIN — FENTANYL CITRATE 50 MCG: 50 INJECTION INTRAMUSCULAR; INTRAVENOUS at 07:38

## 2024-08-14 RX ADMIN — INDOCYANINE GREEN AND WATER 7.5 MG: KIT at 05:48

## 2024-08-14 RX ADMIN — GLYCOPYRROLATE 0.4 MG: 0.2 INJECTION, SOLUTION INTRAMUSCULAR; INTRAVENOUS at 08:48

## 2024-08-14 RX ADMIN — ROCURONIUM BROMIDE 50 MG: 10 INJECTION, SOLUTION INTRAVENOUS at 07:38

## 2024-08-14 RX ADMIN — HYDROMORPHONE HYDROCHLORIDE 0.5 MG: 1 INJECTION, SOLUTION INTRAMUSCULAR; INTRAVENOUS; SUBCUTANEOUS at 09:54

## 2024-08-14 RX ADMIN — LIDOCAINE HYDROCHLORIDE 100 MG: 20 INJECTION, SOLUTION EPIDURAL; INFILTRATION; INTRACAUDAL at 07:38

## 2024-08-14 RX ADMIN — NEOSTIGMINE 3 MG: 1 INJECTION INTRAVENOUS at 08:48

## 2024-08-14 RX ADMIN — ONDANSETRON 4 MG: 2 INJECTION INTRAMUSCULAR; INTRAVENOUS at 08:51

## 2024-08-14 RX ADMIN — HYDROMORPHONE HYDROCHLORIDE 0.5 MG: 1 INJECTION, SOLUTION INTRAMUSCULAR; INTRAVENOUS; SUBCUTANEOUS at 10:18

## 2024-08-14 RX ADMIN — MIDAZOLAM 2 MG: 1 INJECTION INTRAMUSCULAR; INTRAVENOUS at 07:30

## 2024-08-14 RX ADMIN — CEFAZOLIN SODIUM 2000 MG: 2 SOLUTION INTRAVENOUS at 07:46

## 2024-08-14 RX ADMIN — SODIUM CHLORIDE, SODIUM LACTATE, POTASSIUM CHLORIDE, AND CALCIUM CHLORIDE: .6; .31; .03; .02 INJECTION, SOLUTION INTRAVENOUS at 07:30

## 2024-08-14 RX ADMIN — ROCURONIUM BROMIDE 10 MG: 10 INJECTION, SOLUTION INTRAVENOUS at 08:35

## 2024-08-14 RX ADMIN — FENTANYL CITRATE 25 MCG: 50 INJECTION INTRAMUSCULAR; INTRAVENOUS at 09:41

## 2024-08-14 RX ADMIN — FENTANYL CITRATE 25 MCG: 50 INJECTION INTRAMUSCULAR; INTRAVENOUS at 09:23

## 2024-08-14 RX ADMIN — PROPOFOL 160 MG: 10 INJECTION, EMULSION INTRAVENOUS at 07:38

## 2024-08-14 NOTE — INTERVAL H&P NOTE
H&P reviewed. After examining the patient I find no changes in the patients condition since the H&P had been written.    Addendum to the commentary previously written. This was discussed yesterday with Surgical Oncology so that there would be time for adequate review and not a last-minute decision. Patient is aware.

## 2024-08-14 NOTE — DISCHARGE INSTR - AVS FIRST PAGE
General Surgery Post-Operative Instructions     1. General: You will feel pulling sensations around the wound or funny aches and pains around the incisions. This is normal. Even minor surgery is a change in your body and this is your body’s way of reaction to it. If you have had abdominal surgery, it may help to support the incision with a small pillow or blanket for comfort when moving or coughing.    2. Wound care:  Okay to shower.  The glue will fall off over the next week or 2.    3. Water: You may shower over the wound, unless there are drain tubes left in place. Do not bathe or use a pool or hot tub until cleared by the physician.    4. Activity: You may go up and down stairs, walk as much as you are comfortable, but walk at least 3 times each day. If you have had abdominal surgery, do not lift anything heavier than 15 pounds for at least 2-4 weeks, unless cleared by the doctor.    5. Diet: You may resume a regular diet. If you had a same-day surgery or overnight stay surgery, he may wish to eat lightly for a few days: soups, crackers, and sandwiches. You may resume a regular diet when ready.    6. Medications: Resume all of your previous medications, unless told otherwise by the doctor. Avoid aspirin or ibuprofen (Advil, Motrin, etc.) products for 2-3 days after the date of surgery. You may, at that time, began to take them again. Tylenol is always fine, unless you are taking any narcotic pain medication containing Tylenol (such as Percocet, Darvocet, Vicodin, or anything containing acetaminophen). Do not take Tylenol if you're taking these medications. You do not need to take the narcotic pain medications unless you are having significant pain and discomfort.    7. Driving: You will need someone to drive you home on the day of surgery. Do not drive or make any important decisions while on narcotic pain medication or 24 hours and after anesthesia or sedation for surgery. Generally, you may drive when your off  all narcotic pain medications.    8. Upset Stomach: You may take Maalox, Tums, or similar items for an upset stomach. If your narcotic pain medication causes an upset stomach, do not take it on an empty stomach. Try taking it with at least some crackers or toast.     9. Constipation: Patients often experienced constipation after surgery. You may take over-the-counter medication for this, such as Metamucil, Senokot, Dulcolax, milk of magnesia, etc. You may take a suppository unless you have had anorectal surgery such as a procedure on your hemorrhoids. If you experience significant nausea or vomiting after abdominal surgery, call the office before trying any of these medications.    10. Call the office: If you are experiencing any of the following, fevers above 101.5°, significant nausea or vomiting, if the wound develops drainage and/or is excessive redness around the wound, or if you have significant diarrhea or other worsening symptoms.    11. Pain: You may be given a prescription for pain. This will be given to the hospital, the day of surgery.    12. Sexual Activity: You may resume sexual activity when you feel ready and comfortable and your incision is sealed and healed without apparent infection risk.    13. Urination: If you haven't urinated in 6 hours, go directly to the ER for evaluation for urinary retention.     14. Follow-up in 2 weeks.

## 2024-08-14 NOTE — OP NOTE
ADE LAPAROSCOPIC W/ ROBOTICS  Postoperative Note  PATIENT NAME: Miguelina Russo  : 1965  MRN: [id number]  [facility] OR ROOM 08    Surgery Date: 2024    Pre operative diagnosis:  Gallstones [K80.20]    Operative Indications:  Symptomatic gallbladder disease    Consent:  The risks, benefits, and alternatives to the surgery were discussed with the patient and with the family prior to surgery if present, personally by Dr. Cisneros.  If the consent was obtained by the physician assistant or other representative, the consent was reviewed once again personally by the operating physician.  Common complications particular for this procedure as well as unusual complications were discussed, including but not limited to:  bleeding, wound infection, prolonged wound healing, open wounds, reoperation, leak from the bowel or viscus, leak from the bile duct or injury to adjacent or other organs or blood vessels in the abdomen. The significance of bile duct reconstruction was discussed.  If the surgery was laparoscopic, it was discussed that possible open surgery could also occur during that same surgery and is always an option in laparoscopic surgery and/or reoperation.  A  was used if necessary.  The patient expressed understanding of the issues discussed and wished and consented to the procedure to proceed.  All questions were answered.  Dr. Cisneros personally discussed the informed consent with this patient.      Operative Findings:  Excellent critcial view  Excellent ICG view  Mild spillage of bile, no spillage of stones.     Post operative diagnosis:  Post-Op Diagnosis Codes:     * Gallstones [K80.20]    Procedure:   Procedure(s):  ADE LAPAROSCOPIC W/ ROBOTICS    Interpretation of fluorescein dye by surgeon.            Surgeons and Role:     * Mariya Cisneros MD - Primary     * ELAINE Jones-C - Assisting     * Saleem Faulkner MD - Assisting        The First  assistant/PA was medically necessary for  surgical safety the case including suturing, retraction, and hemostasis. A qualified resident was not available for first assistance.  I provided direct and immediate supervision.  I was present for the entire procedure.     Drains:  * No LDAs found *    Specimens:  ID Type Source Tests Collected by Time Destination   1 :  Tissue Gallbladder TISSUE EXAM Mariya Cisneros MD 8/14/2024 0846        Estimated Blood Loss:   Minimal    Anesthesia Type:   Choice     Procedure:  The patient was seen again in the Holding Room. The risks, benefits, complications, treatment options, and expected outcomes were discussed with the patient. The possibilities of reaction to medication, pulmonary aspiration, perforation of viscus, bleeding, recurrent infection, finding a normal gallbladder, the need for additional procedures, failure to diagnose a condition, the possible need to convert to an open procedure, and creating a complication requiring transfusion or operation were discussed with the patient. The patient and/or family concurred with the proposed plan, giving informed consent. The site of surgery properly noted/marked. The patient was taken to Operating Room, identified as Miguelina Russo and the procedure verified as Laparoscopic Cholecystectomy with Possible Intraoperative Cholangiogram. A Time Out was held and the above information confirmed.    Prior to the induction of general anesthesia, antibiotic prophylaxis was administered. General endotracheal anesthesia was then administered and tolerated well. After the induction, the abdomen was prepped in the usual sterile fashion. The patient was positioned in the supine position.The patient was positioned in the supine position, along with some reverse Trendelenburg.    Local anesthetic agent was injected into the skin near the umbilicus and an incision made. The midline fascia was incised and the open technique was used to introduce a  port under direct vision.   Pneumoperitoneum was then created with CO2 and tolerated well without any adverse changes in the patient's vital signs. Additional trocars were introduced under direct vision. All skin incisions were infiltrated with a local anesthetic agent before making the incision and placing the trocars.  The patient was placed in the head up, left side down position.     Robotic ports were used.  The robot was then docked.    The gallbladder was identified, the fundus grasped and retracted cephalad. Adhesions were lysed bluntly and with the electrocautery where indicated, taking care not to injure any adjacent organs or viscus. The infundibulum was grasped and retracted laterally, exposing the peritoneum overlying the triangle of Calot. The peritoneum was removed anteriorly and posteriorly to the gallbladder, with special attention to the backside of the gallbladder dissection.  This allowed for freeing up the gallbladder.      The critical view of the triangle Calot was carried out, dissecting out the cystic duct and cystic artery as the only two tubular structures leading to the gallbladder. Once these were clearly identified, the back wall of the gallbladder was lifted away from the cystic plate to expose the posterior aspect of this dissection, ensuring that there were no posterior structures leading into the liver.     Fluorescein dye had been given to the patient preoperatively.  Using the appropriate camera view, the common bile duct was visualized and well away from the cystic duct during the critical view.    The cystic duct was then doubly ligated with Surgical Clips ( PLASTIC)  on the patient side and singly clipped on the gallbladder side and divided. The cystic artery was re-identified and ligated with clips and divided as well.     The gallbladder was dissected from the liver bed in retrograde fashion with the electrocautery.   The gallbladder was removed, via an Endo pouch, through the umbilical  port.     The  "liver bed was irrigated and inspected. Hemostasis was achieved. Copious irrigation was utilized and was repeatedly aspirated until clear.      The pneumoperitoneum was completely reduced after viewing removal of the trocars under direct vision. The wounds were thoroughly irrigated and the larger port site fascia was then closed with a figure of eight suture; the skin was then closed with 4-0 Monocryl sutures and a sterile dressing was applied.      Sponge count and needle count and instrument count were correct x2, and RFA wanding for sponges was also negative at the end of the procedure prior to closure.     The patient tolerated the procedure well.      Some portions of this record may have been generated with voice recognition software. There may be translation, syntax,  or grammatical errors. Occasional wrong word or \"sound-a-like\" substitutions may have occurred due to the inherent limitations of the voice recognition software. Read the chart carefully and recognize, using context, where substations may have occurred. If you have any questions, please contact the dictating provider for clarification or correction, as needed.       Complications: None    Condition: Stable to PACU    SIGNATURE: Mariya Mcmillan MD   DATE: August 14, 2024   TIME: 9:04 AM    "

## 2024-08-14 NOTE — INTERVAL H&P NOTE
H&P reviewed. After examining the patient I find no changes in the patients condition since the H&P had been written.    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 Discussed with Dr. Michaels in Surgical Oncology , he reviewed the films and the case,  and he agrees we should proceed with the cholecystectomy and we will monitor the choledocho cyst as an outpatient. I discussed this with the patient preoperatively today. Per Surgical Oncology there’s no need to cancel the surgery today as she is highly symptomatic and has large gallstones. She’s aware that there is a malignant potential to the cysts and eventually she may need advanced surgery for this. She wishes to proceed with the cholecystectomy today.

## 2024-08-14 NOTE — ANESTHESIA POSTPROCEDURE EVALUATION
Post-Op Assessment Note    CV Status:  Stable    Pain management: adequate       Mental Status:  Alert and awake   Hydration Status:  Euvolemic   PONV Controlled:  Controlled   Airway Patency:  Patent     Post Op Vitals Reviewed: Yes    No anethesia notable event occurred.    Staff: CRNA               BP   112/60   Temp   97   Pulse  83   Resp   13   SpO2   97

## 2024-08-16 ENCOUNTER — TELEPHONE (OUTPATIENT)
Dept: SURGERY | Facility: CLINIC | Age: 59
End: 2024-08-16

## 2024-08-16 NOTE — TELEPHONE ENCOUNTER
Post op call:  Spoke with patient states she is feeling good minor pain that she expected no nausea fever chills she is to call back with any concern.    I will reach back out to the patient once we receive the pathology results and the doctor has reviewed them,     Patient is scheduled for a post op 08/28/24 with Judy

## 2024-08-19 PROCEDURE — 88304 TISSUE EXAM BY PATHOLOGIST: CPT | Performed by: PATHOLOGY

## 2024-08-23 ENCOUNTER — TELEPHONE (OUTPATIENT)
Age: 59
End: 2024-08-23

## 2024-08-23 ENCOUNTER — TELEMEDICINE (OUTPATIENT)
Dept: PSYCHIATRY | Facility: CLINIC | Age: 59
End: 2024-08-23
Payer: COMMERCIAL

## 2024-08-23 DIAGNOSIS — F31.62 BIPOLAR 1 DISORDER, MIXED, MODERATE (HCC): Primary | ICD-10-CM

## 2024-08-23 DIAGNOSIS — R10.13 EPIGASTRIC PAIN: ICD-10-CM

## 2024-08-23 DIAGNOSIS — F41.9 ANXIETY: ICD-10-CM

## 2024-08-23 DIAGNOSIS — Q44.4 CHOLEDOCHOCYST: Primary | ICD-10-CM

## 2024-08-23 DIAGNOSIS — M79.7 FIBROMYALGIA: ICD-10-CM

## 2024-08-23 DIAGNOSIS — Z51.81 MEDICATION MONITORING ENCOUNTER: ICD-10-CM

## 2024-08-23 PROCEDURE — 99214 OFFICE O/P EST MOD 30 MIN: CPT | Performed by: PHYSICIAN ASSISTANT

## 2024-08-23 RX ORDER — OMEPRAZOLE 40 MG/1
40 CAPSULE, DELAYED RELEASE ORAL
Qty: 90 CAPSULE | Refills: 1 | Status: SHIPPED | OUTPATIENT
Start: 2024-08-23

## 2024-08-23 RX ORDER — LAMOTRIGINE 25 MG/1
TABLET ORAL
Qty: 900 TABLET | Refills: 1 | Status: SHIPPED | OUTPATIENT
Start: 2024-08-23

## 2024-08-23 RX ORDER — ARIPIPRAZOLE 10 MG/1
TABLET ORAL
Qty: 90 TABLET | Refills: 1 | Status: SHIPPED | OUTPATIENT
Start: 2024-08-23

## 2024-08-23 NOTE — PSYCH
Virtual Regular Visit    Verification of patient location:    Patient is located at Home in the following state in which I hold an active license [place]      Assessment/Plan:    Problem List Items Addressed This Visit          Surgery/Wound/Pain    Fibromyalgia     Other Visit Diagnoses       Bipolar 1 disorder, mixed, moderate (HCC)    -  Primary    Relevant Medications    lamoTRIgine (LaMICtal) 25 mg tablet    ARIPiprazole (ABILIFY) 10 mg tablet    Medication monitoring encounter        Anxiety                Goals addressed in session: Goal 1          Reason for visit is   Chief Complaint   Patient presents with    Follow-up    Medication Management    Virtual Regular Visit          Encounter provider Clementine Campuzano PA-C      Recent Visits  No visits were found meeting these conditions.  Showing recent visits within past 7 days and meeting all other requirements  Today's Visits  Date Type Provider Dept   08/23/24 Telemedicine Clementine Campuzano PA-C [facility]   Showing today's visits and meeting all other requirements  Future Appointments  No visits were found meeting these conditions.  Showing future appointments within next 150 days and meeting all other requirements       The patient was identified by name and date of birth. Miguelina Russo was informed that this is a telemedicine visit and that the visit is being conducted throughthe Epic Embedded platform. She agrees to proceed..  My office door was closed. No one else was in the room.  She acknowledged consent and understanding of privacy and security of the video platform. The patient has agreed to participate and understands they can discontinue the visit at any time.    Patient is aware this is a billable service.     Subjective  Miguelina Russo is a 59 y.o. female with history of bipolar disorder.      HPI   Miguelina was seen for follow-up and for medication management.  At her last visit Cymbalta 20 mg daily was  started.  States that after a few weeks she started to feel as though she was likely more so she discontinued.  She has continued to take her other medications and feels as though she is at her baseline with her moods.  She had gallbladder surgery last Wednesday.  She has been recuperating well from this.  Unfortunately this has caused an exacerbation though and her fibromyalgia pain.  She also states that she was told by the surgeon she has to go see oncology due to pancreatic cyst that were found on her MRI.  She understandably has anxiety regarding this.  She is following up with her surgeon next week.  Her spouse remains a good support.  Medications reviewed and updated.  States that she has continued to take lamotrigine 50 mg in the morning and 200 mg at night.  Will change prescription back to reflect that.  Past Medical History:   Diagnosis Date    Anxiety     Colon polyp     Depression     GERD (gastroesophageal reflux disease)     Ovarian cyst     Urinary incontinence        Past Surgical History:   Procedure Laterality Date    BACK SURGERY      fusion    CERVICAL SPINE SURGERY      disc replaced    COLONOSCOPY      CYSTOSCOPY  03/13/2018         OOPHORECTOMY Right     OR LAPAROSCOPY SLING OPERATION STRESS INCONT N/A 04/24/2018    Procedure: INSERTION PUBOVAGINAL SLING (FEMALE) Trans obturator mid urethral sling insertion, CYSTOSCOPY;  Surgeon: James Mesa MD;  [facility];  Service: Urology    OR LAPAROSCOPY SURG CHOLECYSTECTOMY N/A 8/14/2024    Procedure: ADE LAPAROSCOPIC W/ ROBOTICS;  Surgeon: Mariya Cisneros MD;  [facility];  Service: General       Current Outpatient Medications   Medication Sig Dispense Refill    ARIPiprazole (ABILIFY) 10 mg tablet 1 tab po qHS 90 tablet 1    lamoTRIgine (LaMICtal) 25 mg tablet TAKE 2 PO QAM AND TAKE EIGHT TABLETS BY MOUTH AT BEDTIME 900 tablet 1    Calcium Carbonate (CALTRATE 600 PO) Take by mouth in the morning      gabapentin  (NEURONTIN) 400 mg capsule Take 2 capsules (800 mg total) by mouth 3 (three) times a day 540 capsule 1    hydrOXYzine HCL (ATARAX) 50 mg tablet TAKE ONE-HALF TO ONE TABLET BY MOUTH THREE TIMES A DAY AS NEEDED FOR ANXIETY 90 tablet 1    omeprazole (PriLOSEC) 20 mg delayed release capsule Take 1 capsule (20 mg total) by mouth daily 90 capsule 3    traZODone (DESYREL) 50 mg tablet TAKE THREE TABLETS BY MOUTH AT BEDTIME AS NEEDED 270 tablet 1     No current facility-administered medications for this visit.        Allergies   Allergen Reactions    Benadryl [Diphenhydramine] Hyperactivity    Tylenol With Codeine #3 [Acetaminophen-Codeine] Hyperactivity    Azithromycin Rash    Erythromycin Rash       Review of Systems  As noted in HPI  Video Exam    There were no vitals filed for this visit.    Physical Exam   Mental status exam:  Speech is clear, coherent, normal rate and volume  Adequate hygiene, good eye contact  Affect is slightly constricted  mood is congruent, anxious  Linear and coherent thought process  No suicidal or homicidal ideation  No psychotic signs or symptoms noted, no hallucinations and no delusions were voiced  Cognition appears intact  Insight and judgment is intact     Medications and treatment plan as follows:      Continue Cymbalta 20 mg daily due to increased mood lability     hydroxyzine 50 mg 1/2 to 1 tablet 3 times a day as needed for anxiety     Abilify 10 mg at bedtime     lamotrigine 200 mg at night and 50 mg in the morning     Gabapentin 800 mg 3 times daily     Trazodone 50 mg 2-3 at bedtime as needed     Aware of after-hours on-call service and 9  crisis line     She will follow-up in about 1 month and call me sooner if any questions or concerns  Visit Time    Visit Start Time: 1200  Visit Stop Time: 1224  Total Visit Duration:  24 minutes

## 2024-08-23 NOTE — TELEPHONE ENCOUNTER
Patient stated she had a recent lap maylin by Dr. Cisneros on 8/14/24 and the doctor mentioned to her that she found something on the pancreas and would get her set up with Oncology. Patient has not heard from Oncology yet and asking what to do next.     It is noted that the patient has a postop appointment on Wednesday, 8/28/24, with Judy Go PA-C. It is likely that, at that time, they will discuss the referral to Oncology and also schedule the MRI abdomen that was ordered.     Relayed this information to the patient who understands and agrees.

## 2024-08-26 NOTE — TELEPHONE ENCOUNTER
Message left for patient advising her referral has been placed for surgical oncology and we will discuss all in further detail at her upcoming appointment on August 28 with Judy

## 2024-08-27 ENCOUNTER — TELEPHONE (OUTPATIENT)
Age: 59
End: 2024-08-27

## 2024-08-27 NOTE — TELEPHONE ENCOUNTER
I called Miguelina in response to a referral that was received for patient to establish care with Surgical Oncology    Outreach was made to schedule a consultation.    A consultation was scheduled for patient during this call. Patient is scheduled on 9/20/24 at 9:00am with Dr Nunez at the [facility]     Patient declined sooner appointment due to her schedule. Per clinical review, patient should be seen within 14 days.  A message will be sent to the Surgical Oncology Leadership pool in regards to patient's appointment being scheduled outside of scheduling guidelines.

## 2024-08-28 ENCOUNTER — OFFICE VISIT (OUTPATIENT)
Dept: SURGERY | Facility: CLINIC | Age: 59
End: 2024-08-28

## 2024-08-28 VITALS
WEIGHT: 180 LBS | SYSTOLIC BLOOD PRESSURE: 118 MMHG | HEART RATE: 78 BPM | OXYGEN SATURATION: 97 % | DIASTOLIC BLOOD PRESSURE: 68 MMHG | HEIGHT: 67 IN | RESPIRATION RATE: 16 BRPM | BODY MASS INDEX: 28.25 KG/M2 | TEMPERATURE: 97.1 F

## 2024-08-28 DIAGNOSIS — Z90.49 S/P LAPAROSCOPIC CHOLECYSTECTOMY: Primary | ICD-10-CM

## 2024-08-28 PROCEDURE — 99024 POSTOP FOLLOW-UP VISIT: CPT | Performed by: PHYSICIAN ASSISTANT

## 2024-08-28 NOTE — PROGRESS NOTES
Assessment/Plan:   Miguelina Russo is a 59 y.o.female who comes in today for postoperative check after robotic cholecystectomy with Dr. Cisneros on 8/14/24.      Patient is pleased with the outcome of surgery and is doing well.     Pathology: Pathology reviewed with patient, all questions answered.  inal Diagnosis   A. Gallbladder, cholecystectomy:  -Gallbladder with cholelithiasis.      ______________________________________________________  HPI:  Miguelina Russo is a 59 y.o.female who comes in today for postoperative check robotic cholecystectomy with Dr. Cisneros on 8/14/24.    Currently doing well without problems, no fever or chills,no nausea and no vomiting. Patient reports they have resumed their normal diet. denies biliary diarrhea. Reports no issues.  Some reported myalgia on her abdomen - thinks she is having a flare of fibromyalgia.   ROS:  General ROS: negative for - chills, fatigue, fever or night sweats, weight loss  Respiratory ROS: no cough, shortness of breath, or wheezing  Cardiovascular ROS: no chest pain or dyspnea on exertion  Genito-Urinary ROS: no dysuria, trouble voiding, or hematuria  Musculoskeletal ROS: negative for - gait disturbance, joint pain or muscle pain  Neurological ROS: no TIA or stroke symptoms  GI ROS: see HPI  Skin ROS: no new rashes or lesions   Lymphatic ROS: no new adenopathy noted by pt.   Psy ROS: no new mental or behavioral disturbances       Patient Active Problem List   Diagnosis    Stress incontinence of urine    Bipolar 1 disorder (HCC)    Fibromyalgia    Cellulitis of right lower extremity    Chronic bilateral low back pain without sciatica    MDD (major depressive disorder), recurrent severe, without psychosis (HCC)    Panic disorder    Other hyperlipidemia    Asymptomatic menopause    Vitamin D insufficiency    Stage 3a chronic kidney disease (HCC)    Hyperglycemia    Epigastric pain    RUQ pain    Gallstones           Allergies:  Benadryl [diphenhydramine],  Tylenol with codeine #3 [acetaminophen-codeine], Azithromycin, and Erythromycin      Current Outpatient Medications:     ARIPiprazole (ABILIFY) 10 mg tablet, 1 tab po qHS, Disp: 90 tablet, Rfl: 1    Calcium Carbonate (CALTRATE 600 PO), Take by mouth in the morning, Disp: , Rfl:     gabapentin (NEURONTIN) 400 mg capsule, Take 2 capsules (800 mg total) by mouth 3 (three) times a day, Disp: 540 capsule, Rfl: 1    hydrOXYzine HCL (ATARAX) 50 mg tablet, TAKE ONE-HALF TO ONE TABLET BY MOUTH THREE TIMES A DAY AS NEEDED FOR ANXIETY, Disp: 90 tablet, Rfl: 1    lamoTRIgine (LaMICtal) 25 mg tablet, TAKE 2 PO QAM AND TAKE EIGHT TABLETS BY MOUTH AT BEDTIME, Disp: 900 tablet, Rfl: 1    omeprazole (PriLOSEC) 40 MG capsule, TAKE ONE CAPSULE BY MOUTH EVERY DAY BEFORE BREAKFAST, Disp: 90 capsule, Rfl: 1    traZODone (DESYREL) 50 mg tablet, TAKE THREE TABLETS BY MOUTH AT BEDTIME AS NEEDED, Disp: 270 tablet, Rfl: 1    omeprazole (PriLOSEC) 20 mg delayed release capsule, Take 1 capsule (20 mg total) by mouth daily (Patient not taking: Reported on 8/28/2024), Disp: 90 capsule, Rfl: 3    Past Medical History:   Diagnosis Date    Anxiety     Colon polyp     Depression     GERD (gastroesophageal reflux disease)     Ovarian cyst     Urinary incontinence        Past Surgical History:   Procedure Laterality Date    BACK SURGERY      fusion    CERVICAL SPINE SURGERY      disc replaced    COLONOSCOPY      CYSTOSCOPY  03/13/2018         OOPHORECTOMY Right     CT LAPAROSCOPY SLING OPERATION STRESS INCONT N/A 04/24/2018    Procedure: INSERTION PUBOVAGINAL SLING (FEMALE) Trans obturator mid urethral sling insertion, CYSTOSCOPY;  Surgeon: James Mesa MD;  [facility];  Service: Urology    CT LAPAROSCOPY SURG CHOLECYSTECTOMY N/A 8/14/2024    Procedure: ADE LAPAROSCOPIC W/ ROBOTICS;  Surgeon: Mariya Cisneros MD;  [facility];  Service: General       Family History   Problem Relation Age of Onset    No Known Problems  Mother     No Known Problems Father     No Known Problems Sister     No Known Problems Sister     No Known Problems Daughter     No Known Problems Daughter     No Known Problems Daughter     No Known Problems Maternal Grandmother     No Known Problems Maternal Grandfather     No Known Problems Paternal Grandmother     No Known Problems Paternal Grandfather     No Known Problems Paternal Aunt     Breast cancer Neg Hx         reports that she has been smoking cigarettes. She started smoking about 43 years ago. She has a 43.7 pack-year smoking history. She has never been exposed to tobacco smoke. She has never used smokeless tobacco. She reports that she does not currently use alcohol. She reports that she does not use drugs.    Vitals:    08/28/24 0848   BP: 118/68   Pulse: 78   Resp: 16   Temp: (!) 97.1 °F (36.2 °C)   SpO2: 97%       PHYSICAL EXAM  General: normal, cooperative, no distress  Abdominal: soft, nondistended, or nontender  Incision: clean, dry, and intact and healing well        Judy Go PA-C      Date: 8/28/2024 Time: 8:56 AM

## 2024-09-09 ENCOUNTER — TELEPHONE (OUTPATIENT)
Age: 59
End: 2024-09-09

## 2024-09-09 DIAGNOSIS — Z12.31 SCREENING MAMMOGRAM, ENCOUNTER FOR: Primary | ICD-10-CM

## 2024-09-09 NOTE — TELEPHONE ENCOUNTER
I placed an order as requested and sent message to pt through her my chart  
Pt called in requesting to issue Mammogram referral and keep her informed by an return call once order placed. So she can schedule her appt. Thanks   
No

## 2024-09-12 PROBLEM — K83.5: Status: ACTIVE | Noted: 2024-09-12

## 2024-09-20 ENCOUNTER — CONSULT (OUTPATIENT)
Dept: SURGICAL ONCOLOGY | Facility: CLINIC | Age: 59
End: 2024-09-20
Payer: COMMERCIAL

## 2024-09-20 ENCOUNTER — TELEMEDICINE (OUTPATIENT)
Dept: PSYCHIATRY | Facility: CLINIC | Age: 59
End: 2024-09-20

## 2024-09-20 VITALS
OXYGEN SATURATION: 96 % | WEIGHT: 180.4 LBS | HEIGHT: 67 IN | RESPIRATION RATE: 18 BRPM | HEART RATE: 77 BPM | TEMPERATURE: 97.5 F | BODY MASS INDEX: 28.31 KG/M2 | DIASTOLIC BLOOD PRESSURE: 76 MMHG | SYSTOLIC BLOOD PRESSURE: 114 MMHG

## 2024-09-20 DIAGNOSIS — F51.01 PRIMARY INSOMNIA: ICD-10-CM

## 2024-09-20 DIAGNOSIS — Q44.4 CHOLEDOCHOCYST: ICD-10-CM

## 2024-09-20 DIAGNOSIS — K83.5: Primary | ICD-10-CM

## 2024-09-20 DIAGNOSIS — M79.7 FIBROMYALGIA: ICD-10-CM

## 2024-09-20 DIAGNOSIS — F41.9 ANXIETY: ICD-10-CM

## 2024-09-20 DIAGNOSIS — F31.62 BIPOLAR 1 DISORDER, MIXED, MODERATE (HCC): Primary | ICD-10-CM

## 2024-09-20 PROCEDURE — 99203 OFFICE O/P NEW LOW 30 MIN: CPT | Performed by: SURGERY

## 2024-09-20 RX ORDER — HYDROXYZINE HYDROCHLORIDE 50 MG/1
TABLET, FILM COATED ORAL
Qty: 270 TABLET | Refills: 1 | Status: SHIPPED | OUTPATIENT
Start: 2024-09-20

## 2024-09-20 RX ORDER — TRAZODONE HYDROCHLORIDE 50 MG/1
TABLET, FILM COATED ORAL
Qty: 270 TABLET | Refills: 1 | Status: SHIPPED | OUTPATIENT
Start: 2024-09-20

## 2024-09-20 NOTE — PSYCH
Assessment & Plan  Fibromyalgia  Continue gabapentin       Anxiety    Orders:    hydrOXYzine HCL (ATARAX) 50 mg tablet; TAKE ONE-HALF TO ONE TABLET BY MOUTH THREE TIMES A DAY AS NEEDED FOR ANXIETY    Bipolar 1 disorder, mixed, moderate (HCC)  Continue lamotrigine and Abilify  Orders:    hydrOXYzine HCL (ATARAX) 50 mg tablet; TAKE ONE-HALF TO ONE TABLET BY MOUTH THREE TIMES A DAY AS NEEDED FOR ANXIETY    Primary insomnia    Orders:    traZODone (DESYREL) 50 mg tablet; TAKE THREE TABLETS BY MOUTH AT BEDTIME AS NEEDED           Virtual Regular Visit    Verification of patient location:    Patient is located at Home in the following state in which I hold an active license [place]      Assessment/Plan:    Problem List Items Addressed This Visit          Surgery/Wound/Pain    Fibromyalgia     Other Visit Diagnoses       Bipolar 1 disorder, mixed, moderate (HCC)    -  Primary    Relevant Medications    hydrOXYzine HCL (ATARAX) 50 mg tablet    traZODone (DESYREL) 50 mg tablet    Anxiety        Relevant Medications    hydrOXYzine HCL (ATARAX) 50 mg tablet    Primary insomnia        Relevant Medications    traZODone (DESYREL) 50 mg tablet            Goals addressed in session: Goal 1          Reason for visit is   Chief Complaint   Patient presents with    Follow-up    Medication Management    Virtual Regular Visit          Encounter provider Clementine Campuzano PA-C      Recent Visits  No visits were found meeting these conditions.  Showing recent visits within past 7 days and meeting all other requirements  Today's Visits  Date Type Provider Dept   09/20/24 Telemedicine Clementine Campuzano PA-C  [facility]   Showing today's visits and meeting all other requirements  Future Appointments  No visits were found meeting these conditions.  Showing future appointments within next 150 days and meeting all other requirements       The patient was identified by name and date of birth. Miguelina Russo was informed  "that this is a telemedicine visit and that the visit is being conducted throughthe Epic Embedded platform. She agrees to proceed..  My office door was closed. No one else was in the room.  She acknowledged consent and understanding of privacy and security of the video platform. The patient has agreed to participate and understands they can discontinue the visit at any time.    Patient is aware this is a billable service.     Anatoliy Russo is a 59 y.o. female with history of bipolar disorder and anxiety.      STEWART Mcintyre was seen for follow-up of bipolar disorder and for medication management.    Cymbalta was discontinued at her last visit due to increased mood lability.  States that she has some \"moodiness\" but overall has been managing with current medications and treatment plan.  No suicidal or homicidal ideation, no current evidence of joe.  States that she is sleeping well at night with trazodone.  Her appetite has been stable.  Physically she has been feeling better after her gallbladder surgery.  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  She will need additional testing via and discussed being to assess.  States that they appear to be precancerous and she will need to have them surgically removed.  No new medications or other medical issues noted.  Medications reviewed and updated.  Spouse remains a good support.  Past Medical History:   Diagnosis Date    Anxiety     Colon polyp     Depression     GERD (gastroesophageal reflux disease)     Ovarian cyst     Urinary incontinence        Past Surgical History:   Procedure Laterality Date    BACK SURGERY      fusion    CERVICAL SPINE SURGERY      disc replaced    COLONOSCOPY      CYSTOSCOPY  03/13/2018         OOPHORECTOMY Right     DC LAPAROSCOPY SLING OPERATION STRESS INCONT N/A 04/24/2018    Procedure: INSERTION PUBOVAGINAL SLING (FEMALE) Trans obturator mid urethral sling insertion, " CYSTOSCOPY;  Surgeon: James Mesa MD;  Location:  MAIN OR;  Service: Urology    KS LAPAROSCOPY SURG CHOLECYSTECTOMY N/A 8/14/2024    Procedure: ADE LAPAROSCOPIC W/ ROBOTICS;  Surgeon: Mariya Cisneros MD;  Location: [facility];  Service: General       Current Outpatient Medications   Medication Sig Dispense Refill    hydrOXYzine HCL (ATARAX) 50 mg tablet TAKE ONE-HALF TO ONE TABLET BY MOUTH THREE TIMES A DAY AS NEEDED FOR ANXIETY 270 tablet 1    traZODone (DESYREL) 50 mg tablet TAKE THREE TABLETS BY MOUTH AT BEDTIME AS NEEDED 270 tablet 1    ARIPiprazole (ABILIFY) 10 mg tablet 1 tab po qHS 90 tablet 1    Calcium Carbonate (CALTRATE 600 PO) Take by mouth in the morning      gabapentin (NEURONTIN) 400 mg capsule Take 2 capsules (800 mg total) by mouth 3 (three) times a day 540 capsule 1    lamoTRIgine (LaMICtal) 25 mg tablet TAKE 2 PO QAM AND TAKE EIGHT TABLETS BY MOUTH AT BEDTIME 900 tablet 1    omeprazole (PriLOSEC) 20 mg delayed release capsule Take 1 capsule (20 mg total) by mouth daily (Patient not taking: Reported on 8/28/2024) 90 capsule 3    omeprazole (PriLOSEC) 40 MG capsule TAKE ONE CAPSULE BY MOUTH EVERY DAY BEFORE BREAKFAST 90 capsule 1     No current facility-administered medications for this visit.        Allergies   Allergen Reactions    Benadryl [Diphenhydramine] Hyperactivity    Tylenol With Codeine #3 [Acetaminophen-Codeine] Hyperactivity    Azithromycin Rash    Erythromycin Rash       Review of Systems  As noted in HPI  Video Exam    There were no vitals filed for this visit.    Physical Exam   Mental status exam:  Speech is clear, coherent, normal rate and volume  Adequate hygiene, good eye contact  Affect is slightly constricted  mood is congruent, anxious  Linear and coherent thought process  No suicidal or homicidal ideation  No psychotic signs or symptoms noted, no hallucinations and no delusions were voiced  Cognition appears intact  Insight and judgment is intact     Medications and  treatment plan as follows:      hydroxyzine 50 mg 1/2 to 1 tablet 3 times a day as needed for anxiety, recently taking 3/day which has been helpful     Abilify 10 mg at bedtime     lamotrigine 200 mg at night and 50 mg in the morning     Gabapentin 800 mg 3 times daily     Trazodone 50 mg 2-3 at bedtime as needed, typically taking 3     Aware of after-hours on-call service and 9 88 crisis line     She will follow-up in about 2 months and call me sooner if any questions or concern    Visit Time    Visit Start Time: 1145  Visit Stop Time: 1200  Total Visit Duration:  15 minutes

## 2024-09-20 NOTE — PROGRESS NOTES
Surgical Oncology Consult       [address]  [facility]  [address]  [facility] [id number]    Miguelina HAQ Karan  1965  [phone]  [address]  [facility]  [address] [place] [address]    Chief Complaint   Patient presents with    Consult       Assessment/Plan:    No problem-specific Assessment & Plan notes found for this encounter.       Diagnoses and all orders for this visit:    Common bile duct cyst  -     Ambulatory referral to Gastroenterology; Future    Choledochocyst  -     Ambulatory Referral to Surgical Oncology      Advance Care Planning/Advance Directives:  Discussed disease status, cancer treatment plans and/or cancer treatment goals with the patient.     Oncology History    No history exists.       History of Present Illness: 59-year-old woman recently status post robotic laparoscopic cholecystectomy.  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  She was not aware this until imaging studies were performed as part of her gallbladder workup.  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  She is not aware of any of these findings until recently.  He is asymptomatic from a GI standpoint at this point.  No personal history of new onset diabetes.  No history of jaundice.    Review of Systems   Constitutional: Negative.    HENT: Negative.     Eyes: Negative.    Respiratory: Negative.     Cardiovascular: Negative.    Gastrointestinal: Negative.    Endocrine: Negative.    Genitourinary: Negative.    Musculoskeletal: Negative.    Skin: Negative.    Allergic/Immunologic: Negative.    Neurological: Negative.    Hematological: Negative.    Psychiatric/Behavioral: Negative.     All other systems reviewed and are negative.        Patient Active Problem List   Diagnosis    Stress incontinence of urine    Bipolar 1 disorder (HCC)     Fibromyalgia    Cellulitis of right lower extremity    Chronic bilateral low back pain without sciatica    MDD (major depressive disorder), recurrent severe, without psychosis (HCC)    Panic disorder    Other hyperlipidemia    Asymptomatic menopause    Vitamin D insufficiency    Stage 3a chronic kidney disease (HCC)    Hyperglycemia    Epigastric pain    RUQ pain    Gallstones    Common bile duct cyst     Past Medical History:   Diagnosis Date    Anxiety     Colon polyp     Depression     GERD (gastroesophageal reflux disease)     Ovarian cyst     Urinary incontinence      Past Surgical History:   Procedure Laterality Date    BACK SURGERY      fusion    CERVICAL SPINE SURGERY      disc replaced    COLONOSCOPY      CYSTOSCOPY  03/13/2018         OOPHORECTOMY Right     AR LAPAROSCOPY SLING OPERATION STRESS INCONT N/A 04/24/2018    Procedure: INSERTION PUBOVAGINAL SLING (FEMALE) Trans obturator mid urethral sling insertion, CYSTOSCOPY;  Surgeon: James Mesa MD;  Location:  MAIN OR;  Service: Urology    AR LAPAROSCOPY SURG CHOLECYSTECTOMY N/A 8/14/2024    Procedure: ADE LAPAROSCOPIC W/ ROBOTICS;  Surgeon: Mariya Cisneros MD;  Location: [facility];  Service: General     Family History   Problem Relation Age of Onset    No Known Problems Mother     No Known Problems Father     No Known Problems Sister     No Known Problems Sister     No Known Problems Daughter     No Known Problems Daughter     No Known Problems Daughter     No Known Problems Maternal Grandmother     No Known Problems Maternal Grandfather     No Known Problems Paternal Grandmother     No Known Problems Paternal Grandfather     No Known Problems Paternal Aunt     Breast cancer Neg Hx      Social History     Socioeconomic History    Marital status: /Civil Union     Spouse name: Not on file    Number of children: Not on file    Years of education: Not on file    Highest education level: Not on file   Occupational History    Not on  file   Tobacco Use    Smoking status: Every Day     Current packs/day: 1.00     Average packs/day: 1 pack/day for 43.7 years (43.7 ttl pk-yrs)     Types: Cigarettes     Start date: 1981     Passive exposure: Never    Smokeless tobacco: Never   Vaping Use    Vaping status: Never Used   Substance and Sexual Activity    Alcohol use: Not Currently     Comment: social     Drug use: No    Sexual activity: Not Currently   Other Topics Concern    Not on file   Social History Narrative    Not on file     Social Determinants of Health     Financial Resource Strain: Not on file   Food Insecurity: Not on file   Transportation Needs: Not on file   Physical Activity: Not on file   Stress: Not on file   Social Connections: Not on file   Intimate Partner Violence: Not on file   Housing Stability: Not on file       Current Outpatient Medications:     ARIPiprazole (ABILIFY) 10 mg tablet, 1 tab po qHS, Disp: 90 tablet, Rfl: 1    Calcium Carbonate (CALTRATE 600 PO), Take by mouth in the morning, Disp: , Rfl:     gabapentin (NEURONTIN) 400 mg capsule, Take 2 capsules (800 mg total) by mouth 3 (three) times a day, Disp: 540 capsule, Rfl: 1    hydrOXYzine HCL (ATARAX) 50 mg tablet, TAKE ONE-HALF TO ONE TABLET BY MOUTH THREE TIMES A DAY AS NEEDED FOR ANXIETY, Disp: 90 tablet, Rfl: 1    lamoTRIgine (LaMICtal) 25 mg tablet, TAKE 2 PO QAM AND TAKE EIGHT TABLETS BY MOUTH AT BEDTIME, Disp: 900 tablet, Rfl: 1    omeprazole (PriLOSEC) 40 MG capsule, TAKE ONE CAPSULE BY MOUTH EVERY DAY BEFORE BREAKFAST, Disp: 90 capsule, Rfl: 1    traZODone (DESYREL) 50 mg tablet, TAKE THREE TABLETS BY MOUTH AT BEDTIME AS NEEDED, Disp: 270 tablet, Rfl: 1    omeprazole (PriLOSEC) 20 mg delayed release capsule, Take 1 capsule (20 mg total) by mouth daily (Patient not taking: Reported on 8/28/2024), Disp: 90 capsule, Rfl: 3  Allergies   Allergen Reactions    Benadryl [Diphenhydramine] Hyperactivity    Tylenol With Codeine #3 [Acetaminophen-Codeine] Hyperactivity     Azithromycin Rash    Erythromycin Rash     Vitals:    09/20/24 0855   BP: 114/76   Pulse: 77   Resp: 18   Temp: 97.5 °F (36.4 °C)   SpO2: 96%       Physical Exam  Vitals reviewed.   Constitutional:       Appearance: Normal appearance.   HENT:      Head: Normocephalic and atraumatic.      Nose: Nose normal.   Eyes:      Pupils: Pupils are equal, round, and reactive to light.   Cardiovascular:      Rate and Rhythm: Regular rhythm.      Pulses: Normal pulses.      Heart sounds: Normal heart sounds.   Pulmonary:      Breath sounds: Normal breath sounds.   Abdominal:      General: Abdomen is flat. There is no distension.      Palpations: Abdomen is soft. There is no mass.      Tenderness: There is no abdominal tenderness. There is no guarding or rebound.      Hernia: No hernia is present.   Musculoskeletal:         General: Normal range of motion.      Cervical back: Normal range of motion and neck supple.   Skin:     General: Skin is warm and dry.   Neurological:      General: No focal deficit present.      Mental Status: She is alert and oriented to person, place, and time.   Psychiatric:         Mood and Affect: Mood normal.         Behavior: Behavior normal.         Pathology:  none    Labs:  Lab Results   Component Value Date    SODIUM 145 03/09/2024    K 4.8 03/09/2024     03/09/2024    CO2 31 03/09/2024    AGAP 6 03/09/2024    BUN 15 03/09/2024    CREATININE 0.87 03/09/2024    GLUC 95 10/19/2022    GLUF 97 03/09/2024    CALCIUM 9.4 03/09/2024    AST 14 03/09/2024    ALT 14 03/09/2024    ALKPHOS 64 03/09/2024    TP 6.4 03/09/2024    TBILI 0.51 03/09/2024    EGFR 72 03/09/2024         Imaging  MRI OF THE ABDOMEN WITH AND WITHOUT CONTRAST WITH MRCP     INDICATION: 59 years / Female. D18.03: Hemangioma of intra-abdominal structures. Follow-up right upper quadrant ultrasound right lobe lesion.     COMPARISON: Right upper quadrant ultrasound 3/1/2024     TECHNIQUE: Multiplanar/multisequence MRI of the abdomen with  3D MRCP was performed before and after administration of contrast. Imaging performed on 1.5T MRI.     IV Contrast: 7 mL of Gadobutrol injection (SINGLE-DOSE)     FINDINGS:     LOWER CHEST: Unremarkable.     LIVER:  Normal in size and configuration.  No suspicious mass.  Right hepatic lobe 8 mm T1 hypointense/T2 hyperintense lesion, corresponding with lesion seen on ultrasound, demonstrating peripheral nodular enhancement and progressive fill-in, consistent with hemangioma.  Patent hepatic and portal veins.     BILE DUCTS: Cystic lesion in the region of the pancreatic neck/body measuring approximately 1.3 x 0.9 cm (700/17) communicating with the common bile duct and suspicious for type II choledochal cyst.     No choledocholithiasis or biliary stricture.     GALLBLADDER: Cholelithiasis without findings of acute cholecystitis.     PANCREAS: Uncinate process cystic lesion measuring 0.8 x 0.6 cm (700/7) communicating with the main pancreatic duct and likely representing an intraductal papillary mucinous neoplasm (IPMN).  Dorsal duct drains into minor papilla and ventral duct drains into major papilla, without convincing communication between the 2.     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     ADRENAL GLANDS: Unremarkable.     SPLEEN: Normal.     KIDNEYS/PROXIMAL URETERS: No hydroureteronephrosis. No suspicious renal mass.     BOWEL: No dilated loops of bowel.     PERITONEUM/RETROPERITONEUM: No ascites.     LYMPH NODES: No abdominal lymphadenopathy.     VESSELS: No aneurysm.     ABDOMINAL WALL: Unremarkable     BONES: No suspicious osseous lesion. Spinal degenerative changes. Slight S-shaped scoliotic curve of the thoracolumbar spine.     IMPRESSION:     1. MRI has characterized the right hepatic lobe lesion seen on ultrasound as an 8 mm hemangioma.     2. Cystic 1.3 cm lesion communicating with the common bile duct and suspicious for type II choledochal cyst.      3. Uncinate process 0.8 cm cystic lesion communicating with the main pancreatic duct and likely representing an intraductal papillary mucinous neoplasm (IPMN). For simple cyst(s) less than 1.5 cm, recommend yearly follow-up 5 times, then every 2 years   for 2 times. If cyst(s) stable after 9 years, no further follow-ups. Recommend next follow-up in 1 year. Preferred imaging modality: abdomen MRI and MRCP with and without IV contrast, or triple phase abdomen CT with IV contrast, or abdomen MRI and MRCP   without IV contrast.     4. Probable pancreas divisum.     5. Cholelithiasis without evidence of acute cholecystitis.        Resident: ROBERTO KUHN I, the attending radiologist, have reviewed the images and agree with the final report above.     Workstation performed: [id number]  I reviewed the above laboratory and imaging data.    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  Origin unclear whether it is in distal extrapancreatic bile duct versus intra pancreatic bile duct.  Will therefore obtain EUS to determine location and anticipation of potential operation.  Rationale for this discussed with patient and her .  They agree and wish to proceed as outlined.

## 2024-09-24 ENCOUNTER — DOCUMENTATION (OUTPATIENT)
Dept: HEMATOLOGY ONCOLOGY | Facility: CLINIC | Age: 59
End: 2024-09-24

## 2024-09-24 NOTE — PROGRESS NOTES
In-basket message received from Jonathan Ness RN to add patient to the upper GI [facility] on 10/3/2024. Chart reviewed and prep completed.

## 2024-09-29 DIAGNOSIS — F41.9 ANXIETY: ICD-10-CM

## 2024-09-29 DIAGNOSIS — M79.7 FIBROMYALGIA: ICD-10-CM

## 2024-09-29 DIAGNOSIS — F31.62 BIPOLAR 1 DISORDER, MIXED, MODERATE (HCC): ICD-10-CM

## 2024-09-29 DIAGNOSIS — Z51.81 MEDICATION MONITORING ENCOUNTER: ICD-10-CM

## 2024-09-30 RX ORDER — ARIPIPRAZOLE 10 MG/1
TABLET ORAL
Qty: 30 TABLET | Refills: 1 | Status: SHIPPED | OUTPATIENT
Start: 2024-09-30

## 2024-09-30 RX ORDER — DULOXETIN HYDROCHLORIDE 20 MG/1
20 CAPSULE, DELAYED RELEASE ORAL DAILY
Qty: 30 CAPSULE | Refills: 1 | OUTPATIENT
Start: 2024-09-30

## 2024-10-03 ENCOUNTER — DOCUMENTATION (OUTPATIENT)
Dept: HEMATOLOGY ONCOLOGY | Facility: CLINIC | Age: 59
End: 2024-10-03

## 2024-10-03 ENCOUNTER — PATIENT OUTREACH (OUTPATIENT)
Dept: HEMATOLOGY ONCOLOGY | Facility: CLINIC | Age: 59
End: 2024-10-03

## 2024-10-03 NOTE — PROGRESS NOTES
Call made to the pt to inform her briefly of what was discussed at the tumor board meeting this morning and based on the recs she does not need to see GI as she is currently sched for consult w them on 10/15. Stated the cyst found would be monitored by Dr. Nunez (S/O) w f/u scans in the future. She made a comment about how it was told to her to be precancerous and now we would just wait til it comes up to being cancer on imaging. I explained that Dr. Nunez would go into more detail at her next appt w him and discuss. She understands and is ok that I cancelled her consult to GI.

## 2024-10-03 NOTE — PROGRESS NOTES
RECTAL/GI MULTIDISCIPLINARY CASE REVIEW    DATE: 10/3/2024      PRESENTING DOCTOR: Dr. Nunez      DIAGNOSIS: Choledochal cyst      Miguelina Russo was presented at the Rectal/GI Multidisciplinary Conference today.      PHYSICIAN RECOMMENDED PLAN:    -Recommend follow up with interval imaging by surgical oncology.   -Patient is scheduled with Dr. Nunez on 10/18/2024 to discuss tumor board recommendations.     Team agreed to plan.    The final treatment plan will be left to the discretion of the patient and the treating physician.     DISCLAIMERS:  TO THE TREATING PHYSICIAN:  This conference is a meeting of clinicians from various specialty areas who evaluate and discuss patients for whom a multidisciplinary treatment approach is being considered. Please note that the above opinion was a consensus of the conference attendees and is intended only to assist in quality care of the discussed patient.  The responsibility for follow up on the input given during the conference, along with any final decisions regarding plan of care, is that of the patient and the patient's provider. Accordingly, appointments have only been recommended based on this information and have NOT been scheduled unless otherwise noted.      TO THE PATIENT:  This summary is a brief record of major aspects of your cancer treatment. You may choose to share a copy with any of your doctors or nurses. However, this is not a detailed or comprehensive record of your care.      NCCN guidelines were readily available for review at this discussion

## 2024-10-11 ENCOUNTER — HOSPITAL ENCOUNTER (OUTPATIENT)
Dept: RADIOLOGY | Facility: IMAGING CENTER | Age: 59
End: 2024-10-11
Payer: COMMERCIAL

## 2024-10-11 VITALS — HEIGHT: 65 IN | WEIGHT: 180 LBS | BODY MASS INDEX: 29.99 KG/M2

## 2024-10-11 DIAGNOSIS — Z12.31 SCREENING MAMMOGRAM, ENCOUNTER FOR: ICD-10-CM

## 2024-10-11 PROCEDURE — 77063 BREAST TOMOSYNTHESIS BI: CPT

## 2024-10-11 PROCEDURE — 77067 SCR MAMMO BI INCL CAD: CPT

## 2024-10-15 DIAGNOSIS — F31.62 BIPOLAR 1 DISORDER, MIXED, MODERATE (HCC): ICD-10-CM

## 2024-10-15 DIAGNOSIS — F41.9 ANXIETY: ICD-10-CM

## 2024-10-15 RX ORDER — HYDROXYZINE HYDROCHLORIDE 50 MG/1
TABLET, FILM COATED ORAL
Qty: 90 TABLET | Refills: 1 | Status: SHIPPED | OUTPATIENT
Start: 2024-10-15

## 2024-10-18 ENCOUNTER — OFFICE VISIT (OUTPATIENT)
Dept: SURGICAL ONCOLOGY | Facility: CLINIC | Age: 59
End: 2024-10-18
Payer: COMMERCIAL

## 2024-10-18 VITALS
DIASTOLIC BLOOD PRESSURE: 80 MMHG | OXYGEN SATURATION: 97 % | HEIGHT: 65 IN | WEIGHT: 186 LBS | HEART RATE: 70 BPM | SYSTOLIC BLOOD PRESSURE: 131 MMHG | RESPIRATION RATE: 14 BRPM | TEMPERATURE: 98 F | BODY MASS INDEX: 30.99 KG/M2

## 2024-10-18 DIAGNOSIS — K83.5: Primary | ICD-10-CM

## 2024-10-18 PROCEDURE — 99213 OFFICE O/P EST LOW 20 MIN: CPT | Performed by: SURGERY

## 2024-10-18 NOTE — PROGRESS NOTES
Surgical Oncology Follow Up       [address]  [facility]COL[facility] [place]  [address]  [facility] [address]    Miguelina HAQ Karan  1965  [id number]  [address]  CANCER [facility]  [address] [place] [address]    Chief Complaint   Patient presents with    Follow-up       Assessment/Plan:    No problem-specific Assessment & Plan notes found for this encounter.       Diagnoses and all orders for this visit:    Common bile duct cyst  -     MRI abdomen w wo contrast and mrcp; Future  -     Basic metabolic panel; Future      Advance Care Planning/Advance Directives:  Discussed disease status, cancer treatment plans and/or cancer treatment goals with the patient.     Oncology History    No history exists.       History of Present Illness: 59-year-old woman, initially thought to have choledochal cysts, presented to report.  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  -Interval History: She is here to discuss management.    Review of Systems:  Review of Systems   Constitutional: Negative.    HENT: Negative.     Eyes: Negative.    Respiratory: Negative.     Cardiovascular: Negative.    Gastrointestinal: Negative.    Endocrine: Negative.    Genitourinary: Negative.    Musculoskeletal: Negative.    Skin: Negative.    Allergic/Immunologic: Negative.    Neurological: Negative.    Hematological: Negative.    Psychiatric/Behavioral: Negative.     All other systems reviewed and are negative.      Patient Active Problem List   Diagnosis    Stress incontinence of urine    Bipolar 1 disorder (HCC)    Fibromyalgia    Cellulitis of right lower extremity    Chronic bilateral low back pain without sciatica    MDD (major depressive disorder), recurrent severe, without psychosis (HCC)    Panic disorder    Other hyperlipidemia    Asymptomatic menopause    Vitamin D insufficiency    Stage 3a chronic kidney disease (HCC)     Hyperglycemia    Epigastric pain    RUQ pain    Gallstones    Common bile duct cyst     Past Medical History:   Diagnosis Date    Anxiety     Colon polyp     Depression     GERD (gastroesophageal reflux disease)     Ovarian cyst     Urinary incontinence      Past Surgical History:   Procedure Laterality Date    BACK SURGERY      fusion    CERVICAL SPINE SURGERY      disc replaced    COLONOSCOPY      CYSTOSCOPY  03/13/2018         OOPHORECTOMY Right     OH LAPAROSCOPY SLING OPERATION STRESS INCONT N/A 04/24/2018    Procedure: INSERTION PUBOVAGINAL SLING (FEMALE) Trans obturator mid urethral sling insertion, CYSTOSCOPY;  Surgeon: James Mesa MD;  Location:  MAIN OR;  Service: Urology    OH LAPAROSCOPY SURG CHOLECYSTECTOMY N/A 8/14/2024    Procedure: ADE LAPAROSCOPIC W/ ROBOTICS;  Surgeon: Mariya Cisneros MD;  Location: [facility];  Service: General     Family History   Problem Relation Age of Onset    No Known Problems Mother     No Known Problems Father     No Known Problems Sister     No Known Problems Sister     No Known Problems Daughter     No Known Problems Daughter     No Known Problems Daughter     No Known Problems Maternal Grandmother     No Known Problems Maternal Grandfather     No Known Problems Paternal Grandmother     No Known Problems Paternal Grandfather     No Known Problems Paternal Aunt     Breast cancer Neg Hx      Social History     Socioeconomic History    Marital status: /Civil Union     Spouse name: Not on file    Number of children: Not on file    Years of education: Not on file    Highest education level: Not on file   Occupational History    Not on file   Tobacco Use    Smoking status: Every Day     Current packs/day: 1.00     Average packs/day: 1 pack/day for 43.8 years (43.8 ttl pk-yrs)     Types: Cigarettes     Start date: 1981     Passive exposure: Never    Smokeless tobacco: Never   Vaping Use    Vaping status: Never Used   Substance and Sexual Activity     Alcohol use: Not Currently     Comment: social     Drug use: No    Sexual activity: Not Currently   Other Topics Concern    Not on file   Social History Narrative    Not on file     Social Determinants of Health     Financial Resource Strain: Not on file   Food Insecurity: Not on file   Transportation Needs: Not on file   Physical Activity: Not on file   Stress: Not on file   Social Connections: Not on file   Intimate Partner Violence: Not on file   Housing Stability: Not on file       Current Outpatient Medications:     ARIPiprazole (ABILIFY) 10 mg tablet, TAKE ONE TABLET BY MOUTH DAILY AT BEDTIME, Disp: 30 tablet, Rfl: 1    Calcium Carbonate (CALTRATE 600 PO), Take by mouth in the morning, Disp: , Rfl:     gabapentin (NEURONTIN) 400 mg capsule, Take 2 capsules (800 mg total) by mouth 3 (three) times a day, Disp: 540 capsule, Rfl: 1    hydrOXYzine HCL (ATARAX) 50 mg tablet, TAKE ONE-HALF TO ONE TABLET BY MOUTH THREE TIMES A DAY AS NEEDED FOR ANXIETY, Disp: 90 tablet, Rfl: 1    lamoTRIgine (LaMICtal) 25 mg tablet, TAKE 2 PO QAM AND TAKE EIGHT TABLETS BY MOUTH AT BEDTIME, Disp: 900 tablet, Rfl: 1    omeprazole (PriLOSEC) 40 MG capsule, TAKE ONE CAPSULE BY MOUTH EVERY DAY BEFORE BREAKFAST, Disp: 90 capsule, Rfl: 1    traZODone (DESYREL) 50 mg tablet, TAKE THREE TABLETS BY MOUTH AT BEDTIME AS NEEDED, Disp: 270 tablet, Rfl: 1    omeprazole (PriLOSEC) 20 mg delayed release capsule, Take 1 capsule (20 mg total) by mouth daily (Patient not taking: Reported on 8/28/2024), Disp: 90 capsule, Rfl: 3  Allergies   Allergen Reactions    Benadryl [Diphenhydramine] Hyperactivity    Tylenol With Codeine #3 [Acetaminophen-Codeine] Hyperactivity    Azithromycin Rash    Erythromycin Rash     Vitals:    10/18/24 1039   BP: 131/80   Pulse: 70   Resp: 14   Temp: 98 °F (36.7 °C)   SpO2: 97%       Physical Exam  Vitals reviewed.   Constitutional:       Appearance: Normal appearance.   Abdominal:      General: Abdomen is flat.       Palpations: Abdomen is soft.   Neurological:      Mental Status: She is alert.           Results:  Labs:  none    Imaging    Addendum    ADDENDUM:     Upon review of this MRI, what I described as a 1.3 cm type II choledochal cyst is, in fact, within the pancreas, adjacent to, but separate from the CBD and most likely a branch duct IPMN.   Addended by Wilber Guerrero MD on 9/30/2024  9:04 AM     Study Result    Narrative & Impression   MRI OF THE ABDOMEN WITH AND WITHOUT CONTRAST WITH MRCP     INDICATION: 59 years / Female. D18.03: Hemangioma of intra-abdominal structures. Follow-up right upper quadrant ultrasound right lobe lesion.     COMPARISON: Right upper quadrant ultrasound 3/1/2024     TECHNIQUE: Multiplanar/multisequence MRI of the abdomen with 3D MRCP was performed before and after administration of contrast. Imaging performed on 1.5T MRI.     IV Contrast: 7 mL of Gadobutrol injection (SINGLE-DOSE)     FINDINGS:     LOWER CHEST: Unremarkable.     LIVER:  Normal in size and configuration.  No suspicious mass.  Right hepatic lobe 8 mm T1 hypointense/T2 hyperintense lesion, corresponding with lesion seen on ultrasound, demonstrating peripheral nodular enhancement and progressive fill-in, consistent with hemangioma.  Patent hepatic and portal veins.     BILE DUCTS: Cystic lesion in the region of the pancreatic neck/body measuring approximately 1.3 x 0.9 cm (700/17) communicating with the common bile duct and suspicious for type II choledochal cyst.     No choledocholithiasis or biliary stricture.     GALLBLADDER: Cholelithiasis without findings of acute cholecystitis.     PANCREAS: Uncinate process cystic lesion measuring 0.8 x 0.6 cm (700/7) communicating with the main pancreatic duct and likely representing an intraductal papillary mucinous neoplasm (IPMN).  Dorsal duct drains into minor papilla and ventral duct drains into major papilla, without convincing communication between the 2.     Pancreatic  head/neck 1.3 cm cystic lesion as detailed above, likely type II choledochal cyst.     ADRENAL GLANDS: Unremarkable.     SPLEEN: Normal.     KIDNEYS/PROXIMAL URETERS: No hydroureteronephrosis. No suspicious renal mass.     BOWEL: No dilated loops of bowel.     PERITONEUM/RETROPERITONEUM: No ascites.     LYMPH NODES: No abdominal lymphadenopathy.     VESSELS: No aneurysm.     ABDOMINAL WALL: Unremarkable     BONES: No suspicious osseous lesion. Spinal degenerative changes. Slight S-shaped scoliotic curve of the thoracolumbar spine.     IMPRESSION:     1. MRI has characterized the right hepatic lobe lesion seen on ultrasound as an 8 mm hemangioma.     2. Cystic 1.3 cm lesion communicating with the common bile duct and suspicious for type II choledochal cyst.     3. Uncinate process 0.8 cm cystic lesion communicating with the main pancreatic duct and likely representing an intraductal papillary mucinous neoplasm (IPMN). For simple cyst(s) less than 1.5 cm, recommend yearly follow-up 5 times, then every 2 years   for 2 times. If cyst(s) stable after 9 years, no further follow-ups. Recommend next follow-up in 1 year. Preferred imaging modality: abdomen MRI and MRCP with and without IV contrast, or triple phase abdomen CT with IV contrast, or abdomen MRI and MRCP   without IV contrast.     4. Probable pancreas divisum.     5. Cholelithiasis without evidence of acute cholecystitis.        Resident: ROBERTO KUHN I, the attending radiologist, have reviewed the images and agree with the final report above.     Workstation performed: [id number]          Mammo screening bilateral w 3d and cad    Result Date: 10/16/2024  Narrative: DIAGNOSIS: Screening mammogram, encounter for TECHNIQUE: Digital screening mammography was performed. Computer Aided Detection (CAD) analyzed all applicable images. COMPARISONS: Prior breast imaging dated: 09/29/2023 RELEVANT HISTORY: Family Breast Cancer History: History of breast cancer in  Neg Hx. Family Medical History: No known relevant family medical history. Personal History: Hormone history includes birth control. Surgical history includes oophorectomy. No known relevant medical history. The patient is scheduled in a reminder system for screening mammography. 8-10% of cancers will be missed on mammography. Management of a palpable abnormality must be based on clinical grounds.  Patients will be notified of their results via letter from our facility. Accredited by [employer] and FDA. RISK ASSESSMENT: 5 Year Tyrer-Cuzick: 0.65% 10 Year Tyrer-Cuzick: 1.31% Lifetime Tyrer-Cuzick: 3.2% TISSUE DENSITY: The breasts are almost entirely fatty. INDICATION: Miguelina Russo is a 59 y.o. female presenting for screening mammography. FINDINGS: RIGHT 1) ASYMMETRY [A]: There is an asymmetry seen in the upper region of the right breast. Left There are no suspicious masses, grouped microcalcifications or areas of unexplained architectural distortion. The skin and nipple areolar complex are unremarkable.  There are scattered calcifications present.     Impression: Additional imaging required. A breast health care nurse from our facility will be contacting the patient regarding the need for additional imaging. ASSESSMENT/BI-RADS CATEGORY: Right: 0 - Incomplete: Needs Additional Imaging Evaluation Overall: 0 - Incomplete: Needs Additional Imaging Evaluation RECOMMENDATION:      - Diagnostic mammogram at the current time for the right breast.      - Ultrasound at the current time for the right breast. Workstation ID: [id number] Signed by:  Bradley Landon Kocher, MD     I reviewed the above laboratory and imaging data.    Discussion/Summary: Small pancreatic IPMN, sidebranch, urgency thought to be choledochal cyst.  Will follow-up in 6 months with MRI/MRCP for continued close surveillance.  Patient reassurance given.

## 2024-10-21 ENCOUNTER — TELEPHONE (OUTPATIENT)
Dept: FAMILY MEDICINE CLINIC | Facility: CLINIC | Age: 59
End: 2024-10-21

## 2024-10-21 NOTE — TELEPHONE ENCOUNTER
----- Message from Mike Leblanc MD sent at 10/20/2024  5:39 PM EDT -----  Abnormal mammogram right breast.  She is scheduled to have diagnostic mammogram with ultrasound of her right breast.  Please make sure patient make her appointment.

## 2024-10-30 DIAGNOSIS — M79.7 FIBROMYALGIA: ICD-10-CM

## 2024-10-30 DIAGNOSIS — F31.62 BIPOLAR 1 DISORDER, MIXED, MODERATE (HCC): ICD-10-CM

## 2024-10-30 RX ORDER — GABAPENTIN 400 MG/1
800 CAPSULE ORAL 3 TIMES DAILY
Qty: 540 CAPSULE | Refills: 1 | Status: SHIPPED | OUTPATIENT
Start: 2024-10-30

## 2024-11-07 ENCOUNTER — TELEMEDICINE (OUTPATIENT)
Dept: PSYCHIATRY | Facility: CLINIC | Age: 59
End: 2024-11-07
Payer: COMMERCIAL

## 2024-11-07 DIAGNOSIS — F51.01 PRIMARY INSOMNIA: ICD-10-CM

## 2024-11-07 DIAGNOSIS — Z51.81 MEDICATION MONITORING ENCOUNTER: ICD-10-CM

## 2024-11-07 DIAGNOSIS — M79.7 FIBROMYALGIA: ICD-10-CM

## 2024-11-07 DIAGNOSIS — F41.9 ANXIETY: ICD-10-CM

## 2024-11-07 DIAGNOSIS — F31.32 BIPOLAR DISORDER WITH MODERATE DEPRESSION (HCC): Primary | ICD-10-CM

## 2024-11-07 PROCEDURE — 99214 OFFICE O/P EST MOD 30 MIN: CPT | Performed by: PHYSICIAN ASSISTANT

## 2024-11-07 RX ORDER — ARIPIPRAZOLE 10 MG/1
TABLET ORAL
Qty: 90 TABLET | Refills: 1 | Status: SHIPPED | OUTPATIENT
Start: 2024-11-07

## 2024-11-07 RX ORDER — BUPROPION HYDROCHLORIDE 150 MG/1
150 TABLET ORAL EVERY MORNING
Qty: 30 TABLET | Refills: 2 | Status: SHIPPED | OUTPATIENT
Start: 2024-11-07

## 2024-11-07 NOTE — BH TREATMENT PLAN
"TREATMENT PLAN (Medication Management Only)        [facility]    Name and Date of Birth:  Miguelina Russo 59 y.o. 1965  Date of Treatment Plan: November 7, 2024  Diagnosis/Diagnoses:    1. Bipolar 1 disorder, mixed, moderate (HCC)    2. Fibromyalgia    3. Medication monitoring encounter    4. Anxiety      Strengths/Personal Resources for Self-Care: \"spouse, Thierry\".  Area/Areas of need (in own words): \"get my butt moving\"  1. Long Term Goal: improve depression.  Target Date:6 months - 5/7/2025  Person/Persons responsible for completion of goal: Miguelina  2.  Short Term Objective (s) - How will we reach this goal?:   A. Provider new recommended medication/dosage changes and/or continue medication(s):  Add Wellbutrin  mg every morning .  B. N/A.  C. N/A.  Target Date:6 months - 5/7/2025  Person/Persons Responsible for Completion of Goal: Miguelina  Progress Towards Goals: stable  Treatment Modality: medication management every 1 month  Review due 180 days from date of this plan: 6 months - 5/7/2025  Expected length of service: maintenance  My Physician/PA/NP and I have developed this plan together and I agree to work on the goals and objectives. I understand the treatment goals that were developed for my treatment.      "

## 2024-11-07 NOTE — PSYCH
Virtual Regular Visit    Patient is located at Home in the following state in which I hold an active license [place].    Assessment & Plan  Bipolar disorder with moderate depression (HCC)  Add Wellbutrin  mg every morning  Continue lamotrigine 200 mg at night 50 mg morning  Abilify 10 mg at bedtime  Orders:    buPROPion (Wellbutrin XL) 150 mg 24 hr tablet; Take 1 tablet (150 mg total) by mouth every morning    ARIPiprazole (ABILIFY) 10 mg tablet; TAKE ONE TABLET BY MOUTH DAILY AT BEDTIME    Fibromyalgia  Continue gabapentin 800 mg 3 times daily       Medication monitoring encounter    Orders:    buPROPion (Wellbutrin XL) 150 mg 24 hr tablet; Take 1 tablet (150 mg total) by mouth every morning    Anxiety  Hydroxyzine as needed       Primary insomnia  Trazodone 50 mg 2-3 at bedtime as needed                 Reason for visit is   Chief Complaint   Patient presents with    Follow-up    Medication Management    Virtual Regular Visit        Visit Time  Visit Start Time: 830  Visit Stop Time: 853  Total Visit Duration: 23 minutes    Encounter provider: Clementine Campuzano PA-C  Provider located at [address] [place] [id number] [phone]    Recent Visits  No visits were found meeting these conditions.  Showing recent visits within past 7 days and meeting all other requirements  Today's Visits  Date Type Provider Dept   11/07/24 Telemedicine Clementine Campuzano PA-C  [facility]   Showing today's visits and meeting all other requirements  Future Appointments  No visits were found meeting these conditions.  Showing future appointments within next 150 days and meeting all other requirements       The patient was identified by name and date of birth. Miguelina Russo was informed that this is a telemedicine visit and that the visit is being conducted through the Epic Embedded platform. She agrees to proceed. My office  door was closed. No one else was in the room. She acknowledged consent and understanding of privacy and security of the video platform. The patient has agreed to participate and understands they can discontinue the visit at any time. Patient is aware this is a billable service.     MEDICATION MANAGEMENT NOTE        [facility]      Name and Date of Birth:  Miguelina Russo 59 y.o. 1965 MRN: [id number]    Date of Visit: November 7, 2024         - RT in 3-4 weeks  - The patient was educated about 24 hour and weekend coverage for urgent situations accessed by calling [facility] main practice number/[id number] crisis line         Subjective        Miguelina Russo is a 59 y.o. female, visited for Follow-up, Medication Management, and Virtual Regular Visit, who was virtually seen and evaluated today at the [facility] outpatient clinic for follow-up and medication management. Completes psychiatric assessment without difficulty.     At previous outpatient psychiatric appointment with this writer, she was maintained on lamotrigine, Abilify, gabapentin and as needed trazodone and as needed hydroxyzine.      Miguelina states that she has not been feeling well.  States that she is feeling more depressed and has little to no motivation.  States that she has not been able to accomplish things and has been spending much of her time sitting around.  She has been sleeping well at night and has been able to get herself up in the mornings.  Also states that her appetite is adequate.  Her anxiety is manageable.  No evidence of manic signs or symptoms.  Taking her medications as prescribed.  No new medications or medical issues noted.  States some flares with fibromyalgia pain but managing.  Physically she is doing well.  Good supportive relationship with her spouse.        Review Of Systems:  Pertinent items are noted in HPI; all others  negative    PHQ-2/9 Depression Screening                 Historical Information        Past Medical History:    Past Medical History:   Diagnosis Date    Anxiety     Colon polyp     Depression     GERD (gastroesophageal reflux disease)     Ovarian cyst     Urinary incontinence         Past Surgical History:   Procedure Laterality Date    BACK SURGERY      fusion    CERVICAL SPINE SURGERY      disc replaced    COLONOSCOPY      CYSTOSCOPY  03/13/2018         OOPHORECTOMY Right     AK LAPAROSCOPY SLING OPERATION STRESS INCONT N/A 04/24/2018    Procedure: INSERTION PUBOVAGINAL SLING (FEMALE) Trans obturator mid urethral sling insertion, CYSTOSCOPY;  Surgeon: James Mesa MD;  [facility];  Service: Urology    AK LAPAROSCOPY SURG CHOLECYSTECTOMY N/A 8/14/2024    Procedure: ADE LAPAROSCOPIC W/ ROBOTICS;  Surgeon: Mariya Cisneros MD;  Location: [facility];  Service: General     Allergies   Allergen Reactions    Benadryl [Diphenhydramine] Hyperactivity    Tylenol With Codeine #3 [Acetaminophen-Codeine] Hyperactivity    Azithromycin Rash    Erythromycin Rash       Substance Abuse History:    Social History     Substance and Sexual Activity   Alcohol Use Not Currently    Comment: social      Social History     Substance and Sexual Activity   Drug Use No       Social History:    Social History     Socioeconomic History    Marital status: /Civil Union     Spouse name: Not on file    Number of children: Not on file    Years of education: Not on file    Highest education level: Not on file   Occupational History    Not on file   Tobacco Use    Smoking status: Every Day     Current packs/day: 1.00     Average packs/day: 1 pack/day for 43.8 years (43.8 ttl pk-yrs)     Types: Cigarettes     Start date: 1981     Passive exposure: Never    Smokeless tobacco: Never   Vaping Use    Vaping status: Never Used   Substance and Sexual Activity    Alcohol use: Not Currently     Comment: social     Drug use: No     Sexual activity: Not Currently   Other Topics Concern    Not on file   Social History Narrative    Not on file     Social Determinants of Health     Financial Resource Strain: Not on file   Food Insecurity: Not on file   Transportation Needs: Not on file   Physical Activity: Not on file   Stress: Not on file   Social Connections: Not on file   Intimate Partner Violence: Not on file   Housing Stability: Not on file       Family Psychiatric History:     Family History   Problem Relation Age of Onset    No Known Problems Mother     No Known Problems Father     No Known Problems Sister     No Known Problems Sister     No Known Problems Daughter     No Known Problems Daughter     No Known Problems Daughter     No Known Problems Maternal Grandmother     No Known Problems Maternal Grandfather     No Known Problems Paternal Grandmother     No Known Problems Paternal Grandfather     No Known Problems Paternal Aunt     Breast cancer Neg Hx        History Review: The following portions of the patient's history were reviewed and updated as appropriate: allergies, current medications, past family history, past medical history, past social history, past surgical history and problem list.           Objective      Vital signs in last 24 hours:  There were no vitals filed for this visit.    Mental Status Evaluation:    Appearance age appropriate, casually dressed, dressed appropriately   Behavior cooperative, calm   Speech normal rate, normal volume, normal pitch   Mood depressed   Affect constricted, mood-congruent   Thought Processes organized, goal directed   Associations intact associations   Thought Content no overt delusions   Perceptual Disturbances: no auditory hallucinations, no visual hallucinations   Abnormal Thoughts  Risk Potential Suicidal ideation - None  Homicidal ideation - None  Potential for aggression - No   Orientation oriented to: person, place, time/date, and situation   Memory recent and remote memory  grossly intact   Consciousness alert and awake   Attention Span Concentration Span attention span and concentration are age appropriate   Intellect Not formally assessed   Insight intact   Judgement intact   Muscle Strength and  Gait unable to assess today due to virtual visit   Motor activity unable to assess today due to virtual visit   Language no difficulty naming common objects   Fund of Knowledge adequate knowledge of current events   Pain mild   Pain Scale Fibromyalgia pain     Laboratory Results: I have personally reviewed all pertinent laboratory/tests results  Lipid Profile:   Lab Results   Component Value Date    CHOLESTEROL 143 03/09/2024    HDL 60 03/09/2024    TRIG 75 03/09/2024    LDLCALC 68 03/09/2024     Hemoglobin A1C:   Lab Results   Component Value Date    HGBA1C 5.5 03/09/2024     03/09/2024               Current Outpatient Medications   Medication Sig Dispense Refill    ARIPiprazole (ABILIFY) 10 mg tablet TAKE ONE TABLET BY MOUTH DAILY AT BEDTIME 90 tablet 1    buPROPion (Wellbutrin XL) 150 mg 24 hr tablet Take 1 tablet (150 mg total) by mouth every morning 30 tablet 2    Calcium Carbonate (CALTRATE 600 PO) Take by mouth in the morning      gabapentin (NEURONTIN) 400 mg capsule TAKE TWO CAPSULES BY MOUTH THREE TIMES A  capsule 1    hydrOXYzine HCL (ATARAX) 50 mg tablet TAKE ONE-HALF TO ONE TABLET BY MOUTH THREE TIMES A DAY AS NEEDED FOR ANXIETY 90 tablet 1    lamoTRIgine (LaMICtal) 25 mg tablet TAKE 2 PO QAM AND TAKE EIGHT TABLETS BY MOUTH AT BEDTIME 900 tablet 1    omeprazole (PriLOSEC) 20 mg delayed release capsule Take 1 capsule (20 mg total) by mouth daily (Patient not taking: Reported on 8/28/2024) 90 capsule 3    omeprazole (PriLOSEC) 40 MG capsule TAKE ONE CAPSULE BY MOUTH EVERY DAY BEFORE BREAKFAST 90 capsule 1    traZODone (DESYREL) 50 mg tablet TAKE THREE TABLETS BY MOUTH AT BEDTIME AS NEEDED 270 tablet 1     No current facility-administered medications for this visit.          Medications Risks/Benefits      Risks, Benefits And Possible Side Effects Of Medications:    Risks, benefits, and possible side effects of medications explained to Miguelina and she verbalizes understanding and agreement for treatment.    Controlled Medication Discussion:     Not applicable    Psychotherapy Provided:     Individual psychotherapy provided: No   Psychoeducation provided to the patient and was educated about the importance of compliance with the medications and psychiatric treatment  Supportive psychotherapy provided to the patient    Treatment Plan:    Completed and signed during the session: Yes - Treatment Plan done but not signed at time of office visit due to:  Plan reviewed by video and verbal consent given due to virtual visit.    Note Shared        Clemetnine Campuzano PA-C 11/07/24    This note was completed in part utilizing Dragon dictation Software. Grammatical, translation, syntax errors, random word insertions, spelling mistakes, and incomplete sentences may be an occasional consequence of this system secondary to software limitations with voice recognition, ambient noise, and hardware issues. If you have any questions or concerns about the content, text, or information contained within the body of this dictation, please contact the provider for clarification.

## 2024-12-02 ENCOUNTER — TELEMEDICINE (OUTPATIENT)
Dept: PSYCHIATRY | Facility: CLINIC | Age: 59
End: 2024-12-02
Payer: COMMERCIAL

## 2024-12-02 DIAGNOSIS — F41.9 ANXIETY: ICD-10-CM

## 2024-12-02 DIAGNOSIS — F31.32 BIPOLAR DISORDER WITH MODERATE DEPRESSION (HCC): Primary | ICD-10-CM

## 2024-12-02 DIAGNOSIS — Z51.81 MEDICATION MONITORING ENCOUNTER: ICD-10-CM

## 2024-12-02 DIAGNOSIS — M79.7 FIBROMYALGIA: ICD-10-CM

## 2024-12-02 DIAGNOSIS — F51.04 PSYCHOPHYSIOLOGICAL INSOMNIA: ICD-10-CM

## 2024-12-02 PROCEDURE — 99214 OFFICE O/P EST MOD 30 MIN: CPT | Performed by: PHYSICIAN ASSISTANT

## 2024-12-02 RX ORDER — BUPROPION HYDROCHLORIDE 300 MG/1
300 TABLET ORAL EVERY MORNING
Qty: 30 TABLET | Refills: 2 | Status: SHIPPED | OUTPATIENT
Start: 2024-12-02

## 2024-12-02 NOTE — PSYCH
Virtual Regular Visit    Patient is located at Home in the following state in which I hold an active license [place].    Assessment & Plan  Medication monitoring encounter  Tolerating medications without adverse effects  Orders:    buPROPion (Wellbutrin XL) 300 mg 24 hr tablet; Take 1 tablet (300 mg total) by mouth every morning    Anxiety  Hydroxyzine as needed       Fibromyalgia  Gabapentin 800 mg 3 times daily       Psychophysiological insomnia  Trazodone 50 mg 2-3 at bedtime as needed       Bipolar disorder with moderate depression (HCC)  Increase Wellbutrin to 300 mg every morning  Continue Lamictal 200 mg at night and 50 mg in morning  Abilify 10 mg at night  Orders:    buPROPion (Wellbutrin XL) 300 mg 24 hr tablet; Take 1 tablet (300 mg total) by mouth every morning       She will follow-up in 1 month and call me sooner if any questions or concerns       Reason for visit is   Chief Complaint   Patient presents with    Medication Management    Follow-up        Visit Time  Visit Start Time: 1025  Visit Stop Time: 1045  Total Visit Duration: 20 minutes    Encounter provider: Clementine Campuzano PA-C  Provider located at [address] [id number] [phone]    Recent Visits  No visits were found meeting these conditions.  Showing recent visits within past 7 days and meeting all other requirements  Today's Visits  Date Type Provider Dept   12/02/24 Telemedicine Clementine Campuzano PA-C  Psychiatric [facility]   Showing today's visits and meeting all other requirements  Future Appointments  No visits were found meeting these conditions.  Showing future appointments within next 150 days and meeting all other requirements       The patient was identified by name and date of birth. Miguelina Russo was informed that this is a telemedicine visit and that the visit is being conducted through the Epic Embedded platform. She  agrees to proceed. My office door was closed. No one else was in the room. She acknowledged consent and understanding of privacy and security of the video platform. The patient has agreed to participate and understands they can discontinue the visit at any time. Patient is aware this is a billable service.     MEDICATION MANAGEMENT NOTE        [facility]      Name and Date of Birth:  Miguelina Russo 59 y.o. 1965 MRN: [id number]    Date of Visit: December 2, 2024              Subjective        Miguelina Russo is a 59 y.o. female, visited for Medication Management and Follow-up, who was virtually seen and evaluated today at the [facility] outpatient clinic for follow-up and medication management. Completes psychiatric assessment without difficulty.     At previous outpatient psychiatric appointment with this writer, Wellbutrin  mg every morning was started.   She denies any current adverse medication side effects.      Miguelina states that she noted some improvement for about a week with adding Wellbutrin.  States that now she feels the same as she had before.  She feels apathetic with decreased interest and motivation.  Hypersomnolence has returned.  Little to no enjoyment.  States that she canceled Thanksgiving dinner which she normally prepares.  She is spending more time sleeping or in bed.  Denies suicidal or homicidal ideation.  No adverse effects noted with Wellbutrin and states that she has had decreased in appetite which has been beneficial.  Fibromyalgia symptoms have been manageable with gabapentin.  No new medications or medical issues noted.          Review Of Systems:  Pertinent items are noted in HPI; all others are negative; no recent changes in medications or health status reported.    PHQ-2/9 Depression Screening                 Historical Information        Past Medical History:    Past Medical History:   Diagnosis  Date    Anxiety     Colon polyp     Depression     GERD (gastroesophageal reflux disease)     Ovarian cyst     Urinary incontinence         Past Surgical History:   Procedure Laterality Date    BACK SURGERY      fusion    CERVICAL SPINE SURGERY      disc replaced    COLONOSCOPY      CYSTOSCOPY  03/13/2018         OOPHORECTOMY Right     CT LAPAROSCOPY SLING OPERATION STRESS INCONT N/A 04/24/2018    Procedure: INSERTION PUBOVAGINAL SLING (FEMALE) Trans obturator mid urethral sling insertion, CYSTOSCOPY;  Surgeon: James Mesa MD;  [facility];  Service: Urology    CT LAPAROSCOPY SURG CHOLECYSTECTOMY N/A 8/14/2024    Procedure: ADE LAPAROSCOPIC W/ ROBOTICS;  Surgeon: Mariya Cisneros MD;  Location[facility];  Service: General     Allergies   Allergen Reactions    Benadryl [Diphenhydramine] Hyperactivity    Tylenol With Codeine #3 [Acetaminophen-Codeine] Hyperactivity    Azithromycin Rash    Erythromycin Rash       Substance Abuse History:    Social History     Substance and Sexual Activity   Alcohol Use Not Currently    Comment: social      Social History     Substance and Sexual Activity   Drug Use No       Social History:    Social History     Socioeconomic History    Marital status: /Civil Union     Spouse name: Not on file    Number of children: Not on file    Years of education: Not on file    Highest education level: Not on file   Occupational History    Not on file   Tobacco Use    Smoking status: Every Day     Current packs/day: 1.00     Average packs/day: 1 pack/day for 43.9 years (43.9 ttl pk-yrs)     Types: Cigarettes     Start date: 1981     Passive exposure: Never    Smokeless tobacco: Never   Vaping Use    Vaping status: Never Used   Substance and Sexual Activity    Alcohol use: Not Currently     Comment: social     Drug use: No    Sexual activity: Not Currently   Other Topics Concern    Not on file   Social History Narrative    Not on file     Social Drivers of Health      Financial Resource Strain: Not on file   Food Insecurity: Not on file   Transportation Needs: Not on file   Physical Activity: Not on file   Stress: Not on file   Social Connections: Not on file   Intimate Partner Violence: Not on file   Housing Stability: Not on file       Family Psychiatric History:     Family History   Problem Relation Age of Onset    No Known Problems Mother     No Known Problems Father     No Known Problems Sister     No Known Problems Sister     No Known Problems Daughter     No Known Problems Daughter     No Known Problems Daughter     No Known Problems Maternal Grandmother     No Known Problems Maternal Grandfather     No Known Problems Paternal Grandmother     No Known Problems Paternal Grandfather     No Known Problems Paternal Aunt     Breast cancer Neg Hx        History Review: The following portions of the patient's history were reviewed and updated as appropriate: allergies, current medications, past family history, past medical history, past social history, past surgical history and problem list.           Objective      Vital signs in last 24 hours:  There were no vitals filed for this visit.    Mental Status Evaluation:    Appearance age appropriate, casually dressed, dressed appropriately   Behavior cooperative, calm   Speech normal rate, normal volume, monotone   Mood depressed, anxious   Affect blunted   Thought Processes Poverty of thought   Associations intact associations   Thought Content no overt delusions   Perceptual Disturbances: no auditory hallucinations, no visual hallucinations   Abnormal Thoughts  Risk Potential Suicidal ideation - None  Homicidal ideation - None  Potential for aggression - No   Orientation oriented to: person, place, time/date, and situation   Memory recent and remote memory grossly intact   Consciousness alert and awake   Attention Span Concentration Span attention span and concentration are age appropriate   Intellect Not formally assessed    Insight intact   Judgement intact   Muscle Strength and  Gait unable to assess today due to virtual visit   Motor activity unable to assess today due to virtual visit   Language no difficulty naming common objects   Fund of Knowledge adequate knowledge of current events   Pain none   Pain Scale 0     Laboratory Results: I have personally reviewed all pertinent laboratory/tests results  CMP:   Lab Results   Component Value Date    SODIUM 145 03/09/2024    K 4.8 03/09/2024     03/09/2024    CO2 31 03/09/2024    AGAP 6 03/09/2024    BUN 15 03/09/2024    CREATININE 0.87 03/09/2024    GLUC 95 10/19/2022    GLUF 97 03/09/2024    CALCIUM 9.4 03/09/2024    AST 14 03/09/2024    ALT 14 03/09/2024    ALKPHOS 64 03/09/2024    TP 6.4 03/09/2024    ALB 4.4 03/09/2024    TBILI 0.51 03/09/2024    EGFR 72 03/09/2024     Lipid Profile:   Lab Results   Component Value Date    CHOLESTEROL 143 03/09/2024    HDL 60 03/09/2024    TRIG 75 03/09/2024    LDLCALC 68 03/09/2024     Hemoglobin A1C:   Lab Results   Component Value Date    HGBA1C 5.5 03/09/2024     03/09/2024               Current Outpatient Medications   Medication Sig Dispense Refill    buPROPion (Wellbutrin XL) 300 mg 24 hr tablet Take 1 tablet (300 mg total) by mouth every morning 30 tablet 2    ARIPiprazole (ABILIFY) 10 mg tablet TAKE ONE TABLET BY MOUTH DAILY AT BEDTIME 90 tablet 1    Calcium Carbonate (CALTRATE 600 PO) Take by mouth in the morning      gabapentin (NEURONTIN) 400 mg capsule TAKE TWO CAPSULES BY MOUTH THREE TIMES A  capsule 1    hydrOXYzine HCL (ATARAX) 50 mg tablet TAKE ONE-HALF TO ONE TABLET BY MOUTH THREE TIMES A DAY AS NEEDED FOR ANXIETY 90 tablet 1    lamoTRIgine (LaMICtal) 25 mg tablet TAKE 2 PO QAM AND TAKE EIGHT TABLETS BY MOUTH AT BEDTIME 900 tablet 1    omeprazole (PriLOSEC) 20 mg delayed release capsule Take 1 capsule (20 mg total) by mouth daily (Patient not taking: Reported on 8/28/2024) 90 capsule 3    omeprazole  (PriLOSEC) 40 MG capsule TAKE ONE CAPSULE BY MOUTH EVERY DAY BEFORE BREAKFAST 90 capsule 1    traZODone (DESYREL) 50 mg tablet TAKE THREE TABLETS BY MOUTH AT BEDTIME AS NEEDED 270 tablet 1     No current facility-administered medications for this visit.         Medications Risks/Benefits      Risks, Benefits And Possible Side Effects Of Medications:    Risks, benefits, and possible side effects of medications explained to Miguelina and she verbalizes understanding and agreement for treatment.    Controlled Medication Discussion:     Not applicable    Psychotherapy Provided:     Individual psychotherapy provided: No   Psychoeducation provided to the patient and was educated about the importance of compliance with the medications and psychiatric treatment  Supportive psychotherapy provided to the patient    Treatment Plan:    Completed and signed during the session: Not applicable - Treatment Plan not due at this session    Note Shared        Clementine Campuzano PA-C 12/02/24    This note was completed in part utilizing Dragon dictation Software. Grammatical, translation, syntax errors, random word insertions, spelling mistakes, and incomplete sentences may be an occasional consequence of this system secondary to software limitations with voice recognition, ambient noise, and hardware issues. If you have any questions or concerns about the content, text, or information contained within the body of this dictation, please contact the provider for clarification.

## 2024-12-03 ENCOUNTER — TELEPHONE (OUTPATIENT)
Age: 59
End: 2024-12-03

## 2024-12-03 NOTE — TELEPHONE ENCOUNTER
Patient called in to update what their insurance will be as of January, 1st, 2025.    Writer was able to add the [employer] PPO plan to the patients chart.

## 2024-12-13 ENCOUNTER — APPOINTMENT (OUTPATIENT)
Dept: LAB | Age: 59
End: 2024-12-13
Payer: COMMERCIAL

## 2024-12-13 DIAGNOSIS — R73.9 HYPERGLYCEMIA: ICD-10-CM

## 2024-12-13 DIAGNOSIS — E78.49 OTHER HYPERLIPIDEMIA: ICD-10-CM

## 2024-12-13 LAB
ALBUMIN SERPL BCG-MCNC: 4.5 G/DL (ref 3.5–5)
ALP SERPL-CCNC: 75 U/L (ref 34–104)
ALT SERPL W P-5'-P-CCNC: 11 U/L (ref 7–52)
ANION GAP SERPL CALCULATED.3IONS-SCNC: 8 MMOL/L (ref 4–13)
AST SERPL W P-5'-P-CCNC: 13 U/L (ref 13–39)
BASOPHILS # BLD AUTO: 0.08 THOUSANDS/ÂΜL (ref 0–0.1)
BASOPHILS NFR BLD AUTO: 1 % (ref 0–1)
BILIRUB SERPL-MCNC: 0.56 MG/DL (ref 0.2–1)
BUN SERPL-MCNC: 9 MG/DL (ref 5–25)
CALCIUM SERPL-MCNC: 9.6 MG/DL (ref 8.4–10.2)
CHLORIDE SERPL-SCNC: 104 MMOL/L (ref 96–108)
CHOLEST SERPL-MCNC: 165 MG/DL (ref ?–200)
CO2 SERPL-SCNC: 30 MMOL/L (ref 21–32)
CREAT SERPL-MCNC: 1.05 MG/DL (ref 0.6–1.3)
EOSINOPHIL # BLD AUTO: 0.2 THOUSAND/ÂΜL (ref 0–0.61)
EOSINOPHIL NFR BLD AUTO: 3 % (ref 0–6)
ERYTHROCYTE [DISTWIDTH] IN BLOOD BY AUTOMATED COUNT: 13.4 % (ref 11.6–15.1)
EST. AVERAGE GLUCOSE BLD GHB EST-MCNC: 114 MG/DL
GFR SERPL CREATININE-BSD FRML MDRD: 57 ML/MIN/1.73SQ M
GLUCOSE P FAST SERPL-MCNC: 94 MG/DL (ref 65–99)
HBA1C MFR BLD: 5.6 %
HCT VFR BLD AUTO: 45.8 % (ref 34.8–46.1)
HDLC SERPL-MCNC: 63 MG/DL
HGB BLD-MCNC: 15.5 G/DL (ref 11.5–15.4)
IMM GRANULOCYTES # BLD AUTO: 0.01 THOUSAND/UL (ref 0–0.2)
IMM GRANULOCYTES NFR BLD AUTO: 0 % (ref 0–2)
LDLC SERPL CALC-MCNC: 79 MG/DL (ref 0–100)
LYMPHOCYTES # BLD AUTO: 1.92 THOUSANDS/ÂΜL (ref 0.6–4.47)
LYMPHOCYTES NFR BLD AUTO: 32 % (ref 14–44)
MCH RBC QN AUTO: 31.4 PG (ref 26.8–34.3)
MCHC RBC AUTO-ENTMCNC: 33.8 G/DL (ref 31.4–37.4)
MCV RBC AUTO: 93 FL (ref 82–98)
MONOCYTES # BLD AUTO: 0.39 THOUSAND/ÂΜL (ref 0.17–1.22)
MONOCYTES NFR BLD AUTO: 6 % (ref 4–12)
NEUTROPHILS # BLD AUTO: 3.46 THOUSANDS/ÂΜL (ref 1.85–7.62)
NEUTS SEG NFR BLD AUTO: 58 % (ref 43–75)
NRBC BLD AUTO-RTO: 0 /100 WBCS
PLATELET # BLD AUTO: 197 THOUSANDS/UL (ref 149–390)
PMV BLD AUTO: 11.1 FL (ref 8.9–12.7)
POTASSIUM SERPL-SCNC: 3.8 MMOL/L (ref 3.5–5.3)
PROT SERPL-MCNC: 6.7 G/DL (ref 6.4–8.4)
RBC # BLD AUTO: 4.94 MILLION/UL (ref 3.81–5.12)
SODIUM SERPL-SCNC: 142 MMOL/L (ref 135–147)
TRIGL SERPL-MCNC: 115 MG/DL (ref ?–150)
TSH SERPL DL<=0.05 MIU/L-ACNC: 3.54 UIU/ML (ref 0.45–4.5)
WBC # BLD AUTO: 6.06 THOUSAND/UL (ref 4.31–10.16)

## 2024-12-13 PROCEDURE — 80061 LIPID PANEL: CPT

## 2024-12-13 PROCEDURE — 83036 HEMOGLOBIN GLYCOSYLATED A1C: CPT

## 2024-12-13 PROCEDURE — 36415 COLL VENOUS BLD VENIPUNCTURE: CPT

## 2024-12-13 PROCEDURE — 85025 COMPLETE CBC W/AUTO DIFF WBC: CPT

## 2024-12-13 PROCEDURE — 80053 COMPREHEN METABOLIC PANEL: CPT

## 2024-12-13 PROCEDURE — 84443 ASSAY THYROID STIM HORMONE: CPT

## 2024-12-17 ENCOUNTER — RA CDI HCC (OUTPATIENT)
Dept: OTHER | Facility: HOSPITAL | Age: 59
End: 2024-12-17

## 2024-12-20 ENCOUNTER — OFFICE VISIT (OUTPATIENT)
Dept: FAMILY MEDICINE CLINIC | Facility: CLINIC | Age: 59
End: 2024-12-20
Payer: COMMERCIAL

## 2024-12-20 VITALS
WEIGHT: 183.2 LBS | RESPIRATION RATE: 16 BRPM | OXYGEN SATURATION: 98 % | SYSTOLIC BLOOD PRESSURE: 110 MMHG | HEART RATE: 59 BPM | HEIGHT: 64 IN | DIASTOLIC BLOOD PRESSURE: 60 MMHG | TEMPERATURE: 95.2 F | BODY MASS INDEX: 31.28 KG/M2

## 2024-12-20 DIAGNOSIS — E78.49 OTHER HYPERLIPIDEMIA: Primary | ICD-10-CM

## 2024-12-20 DIAGNOSIS — F33.2 MDD (MAJOR DEPRESSIVE DISORDER), RECURRENT SEVERE, WITHOUT PSYCHOSIS (HCC): ICD-10-CM

## 2024-12-20 DIAGNOSIS — F31.9 BIPOLAR 1 DISORDER (HCC): ICD-10-CM

## 2024-12-20 DIAGNOSIS — R73.9 HYPERGLYCEMIA: ICD-10-CM

## 2024-12-20 DIAGNOSIS — N18.31 STAGE 3A CHRONIC KIDNEY DISEASE (HCC): ICD-10-CM

## 2024-12-20 PROCEDURE — G2211 COMPLEX E/M VISIT ADD ON: HCPCS | Performed by: FAMILY MEDICINE

## 2024-12-20 PROCEDURE — 99214 OFFICE O/P EST MOD 30 MIN: CPT | Performed by: FAMILY MEDICINE

## 2024-12-20 NOTE — ASSESSMENT & PLAN NOTE
A1c is well-controlled at 5.6.  Continue to follow low-carb diet.  Continue to monitor fasting glucose and A1c.  Orders:  •  Hemoglobin A1C; Future

## 2024-12-20 NOTE — ASSESSMENT & PLAN NOTE
Well-controlled without medications.  Will continue to monitor fasting lipid profile.  Orders:  •  Comprehensive metabolic panel; Future  •  CBC and differential; Future  •  Lipid Panel with Direct LDL reflex; Future  •  TSH, 3rd generation with Free T4 reflex; Future

## 2024-12-20 NOTE — ASSESSMENT & PLAN NOTE
Lab Results   Component Value Date    EGFR 57 12/13/2024    EGFR 72 03/09/2024    EGFR 53 07/14/2023    CREATININE 1.05 12/13/2024    CREATININE 0.87 03/09/2024    CREATININE 1.12 07/14/2023   For slightly decreased.  Discussed about increase oral hydration.  She was told to stop taking NSAIDs.  Will continue to monitor.

## 2024-12-20 NOTE — PROGRESS NOTES
Name: Miguelina Russo      : 1965      MRN: [id number]  Encounter Provider: Mike Leblanc MD  Encounter Date: 2024   Encounter department: [facility]    Assessment & Plan  Other hyperlipidemia  Well-controlled without medications.  Will continue to monitor fasting lipid profile.  Orders:  •  Comprehensive metabolic panel; Future  •  CBC and differential; Future  •  Lipid Panel with Direct LDL reflex; Future  •  TSH, 3rd generation with Free T4 reflex; Future    Hyperglycemia  A1c is well-controlled at 5.6.  Continue to follow low-carb diet.  Continue to monitor fasting glucose and A1c.  Orders:  •  Hemoglobin A1C; Future    Stage 3a chronic kidney disease (HCC)  Lab Results   Component Value Date    EGFR 57 2024    EGFR 72 2024    EGFR 53 2023    CREATININE 1.05 2024    CREATININE 0.87 2024    CREATININE 1.12 2023   For slightly decreased.  Discussed about increase oral hydration.  She was told to stop taking NSAIDs.  Will continue to monitor.       MDD (major depressive disorder), recurrent severe, without psychosis (HCC)  Stable on Abilify, Cymbalta, Lamictal and trazodone. Continue same. Continue follow-up with psychiatrist.        Bipolar 1 disorder (HCC)  Stable on Abilify, Cymbalta, Lamictal and trazodone. Continue same. Continue follow-up with psychiatrist.             History of Present Illness     She is here today for follow-up multiple medical problems.  She has been taking her medication.  Denies side effect.  She continues to follow-up with psychiatrist.  She has blood work done recently came back showing slightly decreased GFR otherwise the blood work came back stable.  He denies any complaint other than hand pain.      Review of Systems   Constitutional:  Negative for chills and fever.   HENT:  Negative for trouble swallowing.    Eyes:  Negative for visual disturbance.   Respiratory:  Negative for cough and shortness of  breath.    Cardiovascular:  Negative for chest pain, palpitations and leg swelling.   Gastrointestinal:  Negative for abdominal pain, constipation and diarrhea.   Endocrine: Negative for cold intolerance and heat intolerance.   Genitourinary:  Negative for difficulty urinating and dysuria.   Musculoskeletal:  Positive for arthralgias. Negative for gait problem.   Skin:  Negative for rash.   Neurological:  Negative for dizziness, tremors, seizures and headaches.   Hematological:  Negative for adenopathy.   Psychiatric/Behavioral:  Negative for behavioral problems.      Past Medical History:   Diagnosis Date   • Anxiety    • Colon polyp    • Depression    • GERD (gastroesophageal reflux disease)    • Ovarian cyst    • Urinary incontinence      Past Surgical History:   Procedure Laterality Date   • BACK SURGERY      fusion   • CERVICAL SPINE SURGERY      disc replaced   • COLONOSCOPY     • CYSTOSCOPY  03/13/2018        • OOPHORECTOMY Right    • CT LAPAROSCOPY SLING OPERATION STRESS INCONT N/A 04/24/2018    Procedure: INSERTION PUBOVAGINAL SLING (FEMALE) Trans obturator mid urethral sling insertion, CYSTOSCOPY;  Surgeon: James Mesa MD;  Location:  MAIN OR;  Service: Urology   • CT LAPAROSCOPY SURG CHOLECYSTECTOMY N/A 8/14/2024    Procedure: ADE LAPAROSCOPIC W/ ROBOTICS;  Surgeon: Mariya Cisneros MD;  Location: [facility];  Service: General     Family History   Problem Relation Age of Onset   • No Known Problems Mother    • No Known Problems Father    • No Known Problems Sister    • No Known Problems Sister    • No Known Problems Daughter    • No Known Problems Daughter    • No Known Problems Daughter    • No Known Problems Maternal Grandmother    • No Known Problems Maternal Grandfather    • No Known Problems Paternal Grandmother    • No Known Problems Paternal Grandfather    • No Known Problems Paternal Aunt    • Breast cancer Neg Hx      Social History     Tobacco Use   • Smoking status: Every Day  "    Current packs/day: 1.00     Average packs/day: 1 pack/day for 44.0 years (44.0 ttl pk-yrs)     Types: Cigarettes     Start date: 1981     Passive exposure: Never   • Smokeless tobacco: Never   Vaping Use   • Vaping status: Never Used   Substance and Sexual Activity   • Alcohol use: Not Currently     Comment: social    • Drug use: No   • Sexual activity: Not Currently     Current Outpatient Medications on File Prior to Visit   Medication Sig   • ARIPiprazole (ABILIFY) 10 mg tablet TAKE ONE TABLET BY MOUTH DAILY AT BEDTIME   • buPROPion (Wellbutrin XL) 300 mg 24 hr tablet Take 1 tablet (300 mg total) by mouth every morning   • Calcium Carbonate (CALTRATE 600 PO) Take by mouth in the morning   • gabapentin (NEURONTIN) 400 mg capsule TAKE TWO CAPSULES BY MOUTH THREE TIMES A DAY   • hydrOXYzine HCL (ATARAX) 50 mg tablet TAKE ONE-HALF TO ONE TABLET BY MOUTH THREE TIMES A DAY AS NEEDED FOR ANXIETY   • lamoTRIgine (LaMICtal) 25 mg tablet TAKE 2 PO QAM AND TAKE EIGHT TABLETS BY MOUTH AT BEDTIME   • omeprazole (PriLOSEC) 20 mg delayed release capsule Take 1 capsule (20 mg total) by mouth daily   • traZODone (DESYREL) 50 mg tablet TAKE THREE TABLETS BY MOUTH AT BEDTIME AS NEEDED   • omeprazole (PriLOSEC) 40 MG capsule TAKE ONE CAPSULE BY MOUTH EVERY DAY BEFORE BREAKFAST (Patient not taking: Reported on 12/20/2024)     Allergies   Allergen Reactions   • Benadryl [Diphenhydramine] Hyperactivity   • Tylenol With Codeine #3 [Acetaminophen-Codeine] Hyperactivity   • Azithromycin Rash   • Erythromycin Rash     Immunization History   Administered Date(s) Administered   • INFLUENZA 11/17/2014, 12/02/2015, 01/24/2018   • Pneumococcal Conjugate Vaccine 20-valent (Pcv20), Polysace 06/09/2023     Objective   /60 (BP Location: Left arm, Patient Position: Sitting, Cuff Size: Standard)   Pulse 59   Temp (!) 95.2 °F (35.1 °C) (Tympanic)   Resp 16   Ht 5' 4\" (1.626 m)   Wt 83.1 kg (183 lb 3.2 oz)   SpO2 98%   BMI 31.45 kg/m² "     Physical Exam  Vitals and nursing note reviewed.   Constitutional:       Appearance: Normal appearance. She is well-developed.   HENT:      Head: Normocephalic and atraumatic.   Eyes:      Pupils: Pupils are equal, round, and reactive to light.   Cardiovascular:      Rate and Rhythm: Normal rate and regular rhythm.      Heart sounds: Normal heart sounds.   Pulmonary:      Effort: Pulmonary effort is normal.      Breath sounds: Normal breath sounds.   Abdominal:      General: Bowel sounds are normal.      Palpations: Abdomen is soft.   Musculoskeletal:      Cervical back: Normal range of motion and neck supple.   Lymphadenopathy:      Cervical: No cervical adenopathy.   Skin:     General: Skin is warm.   Neurological:      Mental Status: She is alert and oriented to person, place, and time.

## 2024-12-26 ENCOUNTER — HOSPITAL ENCOUNTER (OUTPATIENT)
Dept: MAMMOGRAPHY | Facility: CLINIC | Age: 59
End: 2024-12-26
Payer: COMMERCIAL

## 2024-12-26 VITALS — BODY MASS INDEX: 31.24 KG/M2 | WEIGHT: 183 LBS | HEIGHT: 64 IN

## 2024-12-26 DIAGNOSIS — R92.8 ABNORMAL MAMMOGRAM: ICD-10-CM

## 2024-12-26 PROCEDURE — G0279 TOMOSYNTHESIS, MAMMO: HCPCS

## 2024-12-26 PROCEDURE — 77065 DX MAMMO INCL CAD UNI: CPT

## 2024-12-26 PROCEDURE — 76642 ULTRASOUND BREAST LIMITED: CPT

## 2024-12-28 ENCOUNTER — RESULTS FOLLOW-UP (OUTPATIENT)
Dept: FAMILY MEDICINE CLINIC | Facility: CLINIC | Age: 59
End: 2024-12-28

## 2025-01-03 ENCOUNTER — TELEMEDICINE (OUTPATIENT)
Dept: PSYCHIATRY | Facility: CLINIC | Age: 60
End: 2025-01-03
Payer: COMMERCIAL

## 2025-01-03 DIAGNOSIS — F31.32 BIPOLAR DISORDER WITH MODERATE DEPRESSION (HCC): Primary | ICD-10-CM

## 2025-01-03 DIAGNOSIS — Z51.81 MEDICATION MONITORING ENCOUNTER: ICD-10-CM

## 2025-01-03 DIAGNOSIS — F41.9 ANXIETY: ICD-10-CM

## 2025-01-03 DIAGNOSIS — F51.04 PSYCHOPHYSIOLOGICAL INSOMNIA: ICD-10-CM

## 2025-01-03 DIAGNOSIS — M79.7 FIBROMYALGIA: ICD-10-CM

## 2025-01-03 PROCEDURE — 98006 SYNCH AUDIO-VIDEO EST MOD 30: CPT | Performed by: PHYSICIAN ASSISTANT

## 2025-01-03 RX ORDER — BUPROPION HYDROCHLORIDE 300 MG/1
300 TABLET ORAL EVERY MORNING
Qty: 90 TABLET | Refills: 1 | Status: SHIPPED | OUTPATIENT
Start: 2025-01-03

## 2025-01-03 NOTE — PSYCH
Virtual Regular Visit    Patient is located at Home in the following state in which I hold an active license [place].    Assessment & Plan  Bipolar disorder with moderate depression (HCC)  Wellbutrin XL 30 mg every morning  Lamictal 200 mg at night and 50 mg in the morning  Abilify 10 mg at night  Orders:    buPROPion (Wellbutrin XL) 300 mg 24 hr tablet; Take 1 tablet (300 mg total) by mouth every morning    Medication monitoring encounter  Tolerating medications well without adverse effects  Orders:    buPROPion (Wellbutrin XL) 300 mg 24 hr tablet; Take 1 tablet (300 mg total) by mouth every morning    Anxiety  Hydroxyzine as needed basis       Fibromyalgia  Gabapentin 800 mg 3 times daily       Psychophysiological insomnia  Trazodone 50 mg 2-3 at bedtime as needed          We will follow-up in one month and she will call me sooner if any questions or concerns       Reason for visit is   Chief Complaint   Patient presents with    Follow-up    Medication Management        Visit Time  Visit Start Time: 1030  Visit Stop Time: 1045  Total Visit Duration: 15 minutes    Encounter provider: Clementine Campuzano PA-C  Provider located at [address] [id number] [phone]    Recent Visits  No visits were found meeting these conditions.  Showing recent visits within past 7 days and meeting all other requirements  Today's Visits  Date Type Provider Dept   01/03/25 Telemedicine Clementine Campuzano PA-C  [facility]iatric [facility]   Showing today's visits and meeting all other requirements  Future Appointments  No visits were found meeting these conditions.  Showing future appointments within next 150 days and meeting all other requirements       The patient was identified by name and date of birth. Miguelina Russo was informed that this is a telemedicine visit and that the visit is being conducted through the Epic Embedded  platform. She agrees to proceed. My office door was closed. No one else was in the room. She acknowledged consent and understanding of privacy and security of the video platform. The patient has agreed to participate and understands they can discontinue the visit at any time. Patient is aware this is a billable service.     MEDICATION MANAGEMENT NOTE        [facility]      Name and Date of Birth:  Miguelina Russo 59 y.o. 1965 MRN: [id number]    Date of Visit: January 3, 2025         - RTC in 1 months  - The patient was educated about 24 hour and weekend coverage for urgent situations accessed by calling [facility] main practice number  - Patient was educated to call [facility] Suicide Prevention Lifeline (9-104-650-GYEO [5211]) for behavioral crisis at anytime or 911 for any safety concerns, or go to nearest ER if the symptoms become overwhelming or unmanageable.         Subjective        Miguelina Russo is a 59 y.o. female, visited for Follow-up and Medication Management, who was virtually seen and evaluated today at the [facility] outpatient clinic for follow-up and medication management. Completes psychiatric assessment without difficulty.     At previous outpatient psychiatric appointment with this writer, Wellbutrin XL was increased to 300 mg daily.   She denies any current adverse medication side effects.      Miguelina overall note an improvement since being on higher dosage of Wellbutrin.  She has been taking 30 mg in the morning.    Has continued with her other medications as prescribed.  States that she is no longer sleeping all day.    States that she is sleeping about 7 hours at night.  States that she has been accomplishing more things during the day.    She is able to enjoy herself.  States that she has been cleaning things around the house.    States that the holidays were good and she had her in-laws  over and she was able to enjoy herself.    Physically states that she has also been doing fairly well.  Her fibromyalgia pain has been manageable.    No new medications or new medical issues were noted.  No side effects such as rash, EPS, TD or akathisia.      Review Of Systems:  Pertinent items are noted in HPI; all others are negative; no recent changes in medications or health status reported.    PHQ-2/9 Depression Screening                 Historical Information        Past Medical History:    Past Medical History:   Diagnosis Date    Anxiety     Colon polyp     Depression     GERD (gastroesophageal reflux disease)     Ovarian cyst     Urinary incontinence         Past Surgical History:   Procedure Laterality Date    BACK SURGERY      fusion    CERVICAL SPINE SURGERY      disc replaced    COLONOSCOPY      CYSTOSCOPY  03/13/2018         OOPHORECTOMY Right     TX LAPAROSCOPY SLING OPERATION STRESS INCONT N/A 04/24/2018    Procedure: INSERTION PUBOVAGINAL SLING (FEMALE) Trans obturator mid urethral sling insertion, CYSTOSCOPY;  Surgeon: James Mesa MD;  [facility];  Service: Urology    TX LAPAROSCOPY SURG CHOLECYSTECTOMY N/A 8/14/2024    Procedure: ADE LAPAROSCOPIC W/ ROBOTICS;  Surgeon: Mariya Cisneros MD;  Location: [facility];  Service: General     Allergies   Allergen Reactions    Benadryl [Diphenhydramine] Hyperactivity    Tylenol With Codeine #3 [Acetaminophen-Codeine] Hyperactivity    Azithromycin Rash    Erythromycin Rash       Substance Abuse History:    Social History     Substance and Sexual Activity   Alcohol Use Not Currently    Comment: social      Social History     Substance and Sexual Activity   Drug Use No       Social History:    Social History     Socioeconomic History    Marital status: /Civil Union     Spouse name: Not on file    Number of children: Not on file    Years of education: Not on file    Highest education level: Not on file   Occupational History     Not on file   Tobacco Use    Smoking status: Every Day     Current packs/day: 1.00     Average packs/day: 1 pack/day for 44.0 years (44.0 ttl pk-yrs)     Types: Cigarettes     Start date: 1981     Passive exposure: Never    Smokeless tobacco: Never   Vaping Use    Vaping status: Never Used   Substance and Sexual Activity    Alcohol use: Not Currently     Comment: social     Drug use: No    Sexual activity: Not Currently   Other Topics Concern    Not on file   Social History Narrative    Not on file     Social Drivers of Health     Financial Resource Strain: Not on file   Food Insecurity: Not on file   Transportation Needs: Not on file   Physical Activity: Not on file   Stress: Not on file   Social Connections: Not on file   Intimate Partner Violence: Not on file   Housing Stability: Not on file       Family Psychiatric History:     Family History   Problem Relation Age of Onset    No Known Problems Mother     No Known Problems Father     No Known Problems Sister     No Known Problems Sister     No Known Problems Daughter     No Known Problems Daughter     No Known Problems Daughter     No Known Problems Maternal Grandmother     No Known Problems Maternal Grandfather     No Known Problems Paternal Grandmother     No Known Problems Paternal Grandfather     No Known Problems Paternal Aunt     Breast cancer Neg Hx        History Review: The following portions of the patient's history were reviewed and updated as appropriate: allergies, current medications, past family history, past medical history, past social history, past surgical history and problem list.           Objective      Vital signs in last 24 hours:  There were no vitals filed for this visit.    Mental Status Evaluation:    Appearance age appropriate, casually dressed, dressed appropriately   Behavior cooperative, calm   Speech normal rate, normal volume, normal pitch   Mood less anxious, less depressed   Affect normal range and intensity, appropriate    Thought Processes organized, goal directed   Associations intact associations   Thought Content no overt delusions   Perceptual Disturbances: no auditory hallucinations, no visual hallucinations   Abnormal Thoughts  Risk Potential Suicidal ideation - None  Homicidal ideation - None  Potential for aggression - No   Orientation oriented to: person, place, time/date, and situation   Memory recent and remote memory grossly intact   Consciousness alert and awake   Attention Span Concentration Span attention span and concentration are age appropriate   Intellect appears to be of average intelligence   Insight intact   Judgement intact   Muscle Strength and  Gait unable to assess today due to virtual visit   Motor activity unable to assess today due to virtual visit   Language no difficulty naming common objects, no difficulty repeating a phrase   Fund of Knowledge adequate knowledge of current events  adequate fund of knowledge regarding past history  adequate fund of knowledge regarding vocabulary    Pain none   Pain Scale 0     Laboratory Results: I have personally reviewed all pertinent laboratory/tests results  CMP:   Lab Results   Component Value Date    SODIUM 142 12/13/2024    K 3.8 12/13/2024     12/13/2024    CO2 30 12/13/2024    AGAP 8 12/13/2024    BUN 9 12/13/2024    CREATININE 1.05 12/13/2024    GLUC 95 10/19/2022    GLUF 94 12/13/2024    CALCIUM 9.6 12/13/2024    AST 13 12/13/2024    ALT 11 12/13/2024    ALKPHOS 75 12/13/2024    TP 6.7 12/13/2024    ALB 4.5 12/13/2024    TBILI 0.56 12/13/2024    EGFR 57 12/13/2024     Lipid Profile:   Lab Results   Component Value Date    CHOLESTEROL 165 12/13/2024    HDL 63 12/13/2024    TRIG 115 12/13/2024    LDLCALC 79 12/13/2024     Hemoglobin A1C:   Lab Results   Component Value Date    HGBA1C 5.6 12/13/2024     12/13/2024               Current Outpatient Medications   Medication Sig Dispense Refill    buPROPion (Wellbutrin XL) 300 mg 24 hr tablet Take 1  tablet (300 mg total) by mouth every morning 90 tablet 1    ARIPiprazole (ABILIFY) 10 mg tablet TAKE ONE TABLET BY MOUTH DAILY AT BEDTIME 90 tablet 1    Calcium Carbonate (CALTRATE 600 PO) Take by mouth in the morning      gabapentin (NEURONTIN) 400 mg capsule TAKE TWO CAPSULES BY MOUTH THREE TIMES A  capsule 1    hydrOXYzine HCL (ATARAX) 50 mg tablet TAKE ONE-HALF TO ONE TABLET BY MOUTH THREE TIMES A DAY AS NEEDED FOR ANXIETY 90 tablet 1    lamoTRIgine (LaMICtal) 25 mg tablet TAKE 2 PO QAM AND TAKE EIGHT TABLETS BY MOUTH AT BEDTIME 900 tablet 1    omeprazole (PriLOSEC) 20 mg delayed release capsule Take 1 capsule (20 mg total) by mouth daily 90 capsule 3    omeprazole (PriLOSEC) 40 MG capsule TAKE ONE CAPSULE BY MOUTH EVERY DAY BEFORE BREAKFAST (Patient not taking: Reported on 12/20/2024) 90 capsule 1    traZODone (DESYREL) 50 mg tablet TAKE THREE TABLETS BY MOUTH AT BEDTIME AS NEEDED 270 tablet 1     No current facility-administered medications for this visit.         Medications Risks/Benefits      Risks, Benefits And Possible Side Effects Of Medications:    Risks, benefits, and possible side effects of medications explained to Miguelina and she verbalizes understanding and agreement for treatment.    Controlled Medication Discussion:     Not applicable    Psychotherapy Provided:     Individual psychotherapy provided: No   Psychoeducation provided to the patient and was educated about the importance of compliance with the medications and psychiatric treatment  Supportive psychotherapy provided to the patient    Treatment Plan:    Completed and signed during the session: Not applicable - Treatment Plan not due at this session    Note Share    This note was not shared with the patient due to reasonable likelihood of causing patient harm    Clementine Campuzano PA-C 01/03/25    This note was completed in part utilizing Dragon dictation Software. Grammatical, translation, syntax errors, random word insertions,  spelling mistakes, and incomplete sentences may be an occasional consequence of this system secondary to software limitations with voice recognition, ambient noise, and hardware issues. If you have any questions or concerns about the content, text, or information contained within the body of this dictation, please contact the provider for clarification.

## 2025-01-27 DIAGNOSIS — F41.9 ANXIETY: ICD-10-CM

## 2025-01-27 DIAGNOSIS — F31.62 BIPOLAR 1 DISORDER, MIXED, MODERATE (HCC): ICD-10-CM

## 2025-01-27 DIAGNOSIS — M79.7 FIBROMYALGIA: ICD-10-CM

## 2025-01-27 DIAGNOSIS — Z51.81 MEDICATION MONITORING ENCOUNTER: ICD-10-CM

## 2025-01-28 RX ORDER — DULOXETIN HYDROCHLORIDE 30 MG/1
30 CAPSULE, DELAYED RELEASE ORAL DAILY
Qty: 30 CAPSULE | Refills: 1 | OUTPATIENT
Start: 2025-01-28

## 2025-01-28 RX ORDER — DULOXETIN HYDROCHLORIDE 20 MG/1
20 CAPSULE, DELAYED RELEASE ORAL DAILY
Qty: 30 CAPSULE | Refills: 1 | OUTPATIENT
Start: 2025-01-28

## 2025-02-07 ENCOUNTER — TELEMEDICINE (OUTPATIENT)
Dept: PSYCHIATRY | Facility: CLINIC | Age: 60
End: 2025-02-07
Payer: COMMERCIAL

## 2025-02-07 DIAGNOSIS — F51.04 PSYCHOPHYSIOLOGICAL INSOMNIA: ICD-10-CM

## 2025-02-07 DIAGNOSIS — F31.62 BIPOLAR 1 DISORDER, MIXED, MODERATE (HCC): Primary | ICD-10-CM

## 2025-02-07 DIAGNOSIS — M79.7 FIBROMYALGIA: ICD-10-CM

## 2025-02-07 DIAGNOSIS — F41.9 ANXIETY: ICD-10-CM

## 2025-02-07 DIAGNOSIS — Z51.81 MEDICATION MONITORING ENCOUNTER: ICD-10-CM

## 2025-02-07 PROCEDURE — 99214 OFFICE O/P EST MOD 30 MIN: CPT | Performed by: PHYSICIAN ASSISTANT

## 2025-02-07 RX ORDER — LAMOTRIGINE 25 MG/1
TABLET ORAL
Qty: 900 TABLET | Refills: 1 | Status: SHIPPED | OUTPATIENT
Start: 2025-02-07

## 2025-02-07 NOTE — PSYCH
Virtual Regular Visit    Patient is located at Home in the following state in which I hold an active license [place].    Assessment & Plan  Bipolar 1 disorder, mixed, moderate (HCC)  Moods are currently stable with medications and treatment plan  Wellbutrin  mg every morning  Lamictal 200 mg at night and 50 mg in the morning  Abilify 10 mg at night  Orders:    lamoTRIgine (LaMICtal) 25 mg tablet; TAKE 2 PO QAM AND TAKE EIGHT TABLETS BY MOUTH AT BEDTIME    Medication monitoring encounter  Tolerating medications without adverse effect       Fibromyalgia  Manageable with medications  Continue gabapentin 800 mg 3 times daily       Anxiety  Continue as needed hydroxyzine 50 mg 1/2-1 3 times daily as needed       Psychophysiological insomnia  Continue trazodone 50 mg up to 3 at night as needed                 Reason for visit is   Chief Complaint   Patient presents with    Follow-up    Medication Management        Visit Time  Visit Start Time: 1030  Visit Stop Time: 1050  Total Visit Duration: 20 minutes    Encounter provider: Clementine Campuzano PA-C  Provider located at [address] [address] [phone]    Recent Visits  No visits were found meeting these conditions.  Showing recent visits within past 7 days and meeting all other requirements  Today's Visits  Date Type Provider Dept   02/07/25 Telemedicine Clementine Campuzano PA-C  Psychiatric [facility]   Showing today's visits and meeting all other requirements  Future Appointments  No visits were found meeting these conditions.  Showing future appointments within next 150 days and meeting all other requirements       The patient was identified by name and date of birth. Miguelina Russo was informed that this is a telemedicine visit and that the visit is being conducted through the Epic Embedded platform. She agrees to proceed. My office door was closed. No one  else was in the room. She acknowledged consent and understanding of privacy and security of the video platform. The patient has agreed to participate and understands they can discontinue the visit at any time. Patient is aware this is a billable service.     MEDICATION MANAGEMENT NOTE        [facility]      Name and Date of Birth:  Miguelina Russo 59 y.o. 1965 MRN: [id number]    Date of Visit: February 7, 2025         - RT in 4 weeks  - The patient was educated about 24 hour and weekend coverage for urgent situations accessed by calling [facility] main practice number  - Patient was educated to call [employer] Suicide Prevention Lifeline (1-965-712-UJZN [7362]) for behavioral crisis at anytime or 911 for any safety concerns, or go to nearest ER if the symptoms become overwhelming or unmanageable.         Subjective        Miguelina Russo is a 59 y.o. female, visited for Follow-up and Medication Management, who was virtually seen and evaluated today at the [facility] outpatient clinic for follow-up and medication management. Completes psychiatric assessment without difficulty.     At previous outpatient psychiatric appointment with this writer, she was maintained on same medications.   She denies any current adverse medication side effects.      Miguelina overall reports that she has been doing fairly well.  States that she has good interest and motivation.  Has been working on embroidery/needlepoint which she enjoys.  She is sleeping well at night about 7 hours.  Has been doing things during the day and able to accomplish things.  No evidence of joe.  Feels as though her moods have been stable.  Taking medications as prescribed and no adverse effects noted.  Residual fibromyalgia pain but states it is manageable with current treatment plan.  Good supportive relationship with her spouse.  Her daughter and angela  also reside with them and states that relationships have been stable.  No new medical issues or medications were noted.  Med list reviewed and updated.        Review Of Systems:  Pertinent items are noted in HPI; all others are negative; no recent changes in medications or health status reported.    PHQ-2/9 Depression Screening                 Historical Information        Past Medical History:    Past Medical History:   Diagnosis Date    Anxiety     Colon polyp     Depression     GERD (gastroesophageal reflux disease)     Ovarian cyst     Urinary incontinence         Past Surgical History:   Procedure Laterality Date    BACK SURGERY      fusion    CERVICAL SPINE SURGERY      disc replaced    COLONOSCOPY      CYSTOSCOPY  03/13/2018         OOPHORECTOMY Right     [place] LAPAROSCOPY SLING OPERATION STRESS INCONT N/A 04/24/2018    Procedure: INSERTION PUBOVAGINAL SLING (FEMALE) Trans obturator mid urethral sling insertion, CYSTOSCOPY;  Surgeon: James Mesa MD;  Location:  MAIN OR;  Service: Urology    AZ LAPAROSCOPY SURG CHOLECYSTECTOMY N/A 8/14/2024    Procedure: ADE LAPAROSCOPIC W/ ROBOTICS;  Surgeon: Mariya Cisneros MD;  Location: [facility];  Service: General     Allergies   Allergen Reactions    Benadryl [Diphenhydramine] Hyperactivity    Tylenol With Codeine #3 [Acetaminophen-Codeine] Hyperactivity    Azithromycin Rash    Erythromycin Rash       Substance Abuse History:    Social History     Substance and Sexual Activity   Alcohol Use Not Currently    Comment: social      Social History     Substance and Sexual Activity   Drug Use No       Social History:    Social History     Socioeconomic History    Marital status: /Civil Union     Spouse name: Not on file    Number of children: Not on file    Years of education: Not on file    Highest education level: Not on file   Occupational History    Not on file   Tobacco Use    Smoking status: Every Day     Current packs/day: 1.00     Average  packs/day: 1 pack/day for 44.1 years (44.1 ttl pk-yrs)     Types: Cigarettes     Start date: 1981     Passive exposure: Never    Smokeless tobacco: Never   Vaping Use    Vaping status: Never Used   Substance and Sexual Activity    Alcohol use: Not Currently     Comment: social     Drug use: No    Sexual activity: Not Currently   Other Topics Concern    Not on file   Social History Narrative    Not on file     Social Drivers of Health     Financial Resource Strain: Not on file   Food Insecurity: Not on file   Transportation Needs: Not on file   Physical Activity: Not on file   Stress: Not on file   Social Connections: Not on file   Intimate Partner Violence: Not on file   Housing Stability: Not on file       Family Psychiatric History:     Family History   Problem Relation Age of Onset    No Known Problems Mother     No Known Problems Father     No Known Problems Sister     No Known Problems Sister     No Known Problems Daughter     No Known Problems Daughter     No Known Problems Daughter     No Known Problems Maternal Grandmother     No Known Problems Maternal Grandfather     No Known Problems Paternal Grandmother     No Known Problems Paternal Grandfather     No Known Problems Paternal Aunt     Breast cancer Neg Hx        History Review: The following portions of the patient's history were reviewed and updated as appropriate: allergies, current medications, past family history, past medical history, past social history, past surgical history and problem list.           Objective      Vital signs in last 24 hours:  There were no vitals filed for this visit.    Mental Status Evaluation:    Appearance age appropriate, casually dressed   Behavior cooperative, calm   Speech normal rate, normal volume, normal pitch   Mood euthymic   Affect normal range and intensity, appropriate   Thought Processes organized, goal directed   Associations intact associations   Thought Content no overt delusions   Perceptual Disturbances:  no auditory hallucinations, no visual hallucinations   Abnormal Thoughts  Risk Potential Suicidal ideation - None  Homicidal ideation - None  Potential for aggression - No   Orientation oriented to: person, place, time/date, and situation   Memory recent and remote memory grossly intact   Consciousness alert and awake   Attention Span Concentration Span attention span and concentration are age appropriate   Intellect Not formally assessed   Insight intact   Judgement intact   Muscle Strength and  Gait unable to assess today due to virtual visit   Motor activity unable to assess today due to virtual visit   Language no difficulty naming common objects, no difficulty repeating a phrase   Fund of Knowledge adequate knowledge of current events  adequate fund of knowledge regarding past history  adequate fund of knowledge regarding vocabulary    Pain none   Pain Scale 0     Laboratory Results: I have personally reviewed all pertinent laboratory/tests results  CMP:   Lab Results   Component Value Date    SODIUM 142 12/13/2024    K 3.8 12/13/2024     12/13/2024    CO2 30 12/13/2024    AGAP 8 12/13/2024    BUN 9 12/13/2024    CREATININE 1.05 12/13/2024    GLUC 95 10/19/2022    GLUF 94 12/13/2024    CALCIUM 9.6 12/13/2024    AST 13 12/13/2024    ALT 11 12/13/2024    ALKPHOS 75 12/13/2024    TP 6.7 12/13/2024    ALB 4.5 12/13/2024    TBILI 0.56 12/13/2024    EGFR 57 12/13/2024     Lipid Profile:   Lab Results   Component Value Date    CHOLESTEROL 165 12/13/2024    HDL 63 12/13/2024    TRIG 115 12/13/2024    LDLCALC 79 12/13/2024     Hemoglobin A1C:   Lab Results   Component Value Date    HGBA1C 5.6 12/13/2024     12/13/2024               Current Outpatient Medications   Medication Sig Dispense Refill    ARIPiprazole (ABILIFY) 10 mg tablet TAKE ONE TABLET BY MOUTH DAILY AT BEDTIME 90 tablet 1    buPROPion (Wellbutrin XL) 300 mg 24 hr tablet Take 1 tablet (300 mg total) by mouth every morning 90 tablet 1    Calcium  Carbonate (CALTRATE 600 PO) Take by mouth in the morning      gabapentin (NEURONTIN) 400 mg capsule TAKE TWO CAPSULES BY MOUTH THREE TIMES A  capsule 1    hydrOXYzine HCL (ATARAX) 50 mg tablet TAKE ONE-HALF TO ONE TABLET BY MOUTH THREE TIMES A DAY AS NEEDED FOR ANXIETY 90 tablet 1    lamoTRIgine (LaMICtal) 25 mg tablet TAKE 2 PO QAM AND TAKE EIGHT TABLETS BY MOUTH AT BEDTIME 900 tablet 1    omeprazole (PriLOSEC) 20 mg delayed release capsule Take 1 capsule (20 mg total) by mouth daily 90 capsule 3    omeprazole (PriLOSEC) 40 MG capsule TAKE ONE CAPSULE BY MOUTH EVERY DAY BEFORE BREAKFAST (Patient not taking: Reported on 12/20/2024) 90 capsule 1    traZODone (DESYREL) 50 mg tablet TAKE THREE TABLETS BY MOUTH AT BEDTIME AS NEEDED 270 tablet 1     No current facility-administered medications for this visit.         Medications Risks/Benefits      Risks, Benefits And Possible Side Effects Of Medications:    Risks, benefits, and possible side effects of medications explained to Miguelina and she verbalizes understanding and agreement for treatment.    Controlled Medication Discussion:     Not applicable    Psychotherapy Provided:     Individual psychotherapy provided: No       Treatment Plan:    Completed and signed during the session: Not applicable - Treatment Plan not due at this session    Note Share    This note was not shared with the patient due to reasonable likelihood of causing patient harm    Clementine Campuzano PA-C 02/07/25    This note was completed in part utilizing Dragon dictation Software. Grammatical, translation, syntax errors, random word insertions, spelling mistakes, and incomplete sentences may be an occasional consequence of this system secondary to software limitations with voice recognition, ambient noise, and hardware issues. If you have any questions or concerns about the content, text, or information contained within the body of this dictation, please contact the provider for  clarification.

## 2025-02-15 DIAGNOSIS — R10.13 EPIGASTRIC PAIN: ICD-10-CM

## 2025-02-17 RX ORDER — OMEPRAZOLE 40 MG/1
40 CAPSULE, DELAYED RELEASE ORAL
Qty: 90 CAPSULE | Refills: 1 | Status: SHIPPED | OUTPATIENT
Start: 2025-02-17

## 2025-02-21 ENCOUNTER — RA CDI HCC (OUTPATIENT)
Dept: OTHER | Facility: HOSPITAL | Age: 60
End: 2025-02-21

## 2025-02-26 DIAGNOSIS — F31.62 BIPOLAR 1 DISORDER, MIXED, MODERATE (HCC): ICD-10-CM

## 2025-02-26 DIAGNOSIS — Z51.81 MEDICATION MONITORING ENCOUNTER: ICD-10-CM

## 2025-02-26 DIAGNOSIS — F41.9 ANXIETY: ICD-10-CM

## 2025-02-26 DIAGNOSIS — M79.7 FIBROMYALGIA: ICD-10-CM

## 2025-02-26 RX ORDER — DULOXETIN HYDROCHLORIDE 20 MG/1
20 CAPSULE, DELAYED RELEASE ORAL DAILY
Qty: 30 CAPSULE | Refills: 1 | OUTPATIENT
Start: 2025-02-26

## 2025-02-26 RX ORDER — DULOXETIN HYDROCHLORIDE 30 MG/1
30 CAPSULE, DELAYED RELEASE ORAL DAILY
Qty: 30 CAPSULE | Refills: 1 | OUTPATIENT
Start: 2025-02-26

## 2025-02-28 ENCOUNTER — OFFICE VISIT (OUTPATIENT)
Dept: FAMILY MEDICINE CLINIC | Facility: CLINIC | Age: 60
End: 2025-02-28
Payer: COMMERCIAL

## 2025-02-28 ENCOUNTER — TELEPHONE (OUTPATIENT)
Dept: FAMILY MEDICINE CLINIC | Facility: CLINIC | Age: 60
End: 2025-02-28

## 2025-02-28 VITALS
WEIGHT: 183 LBS | TEMPERATURE: 97.6 F | BODY MASS INDEX: 31.24 KG/M2 | HEART RATE: 77 BPM | SYSTOLIC BLOOD PRESSURE: 128 MMHG | OXYGEN SATURATION: 96 % | HEIGHT: 64 IN | DIASTOLIC BLOOD PRESSURE: 70 MMHG | RESPIRATION RATE: 16 BRPM

## 2025-02-28 DIAGNOSIS — G89.29 CHRONIC BILATERAL LOW BACK PAIN WITH BILATERAL SCIATICA: Primary | ICD-10-CM

## 2025-02-28 DIAGNOSIS — E66.9 OBESITY (BMI 30-39.9): ICD-10-CM

## 2025-02-28 DIAGNOSIS — F31.9 BIPOLAR 1 DISORDER (HCC): ICD-10-CM

## 2025-02-28 DIAGNOSIS — M54.42 CHRONIC BILATERAL LOW BACK PAIN WITH BILATERAL SCIATICA: Primary | ICD-10-CM

## 2025-02-28 DIAGNOSIS — N18.31 STAGE 3A CHRONIC KIDNEY DISEASE (HCC): ICD-10-CM

## 2025-02-28 DIAGNOSIS — M54.41 CHRONIC BILATERAL LOW BACK PAIN WITH BILATERAL SCIATICA: Primary | ICD-10-CM

## 2025-02-28 PROCEDURE — G2211 COMPLEX E/M VISIT ADD ON: HCPCS | Performed by: FAMILY MEDICINE

## 2025-02-28 PROCEDURE — 99214 OFFICE O/P EST MOD 30 MIN: CPT | Performed by: FAMILY MEDICINE

## 2025-02-28 RX ORDER — PREDNISONE 50 MG/1
50 TABLET ORAL DAILY
Qty: 5 TABLET | Refills: 0 | Status: SHIPPED | OUTPATIENT
Start: 2025-02-28 | End: 2025-03-05

## 2025-02-28 RX ORDER — METHOCARBAMOL 500 MG/1
500 TABLET, FILM COATED ORAL
Qty: 30 TABLET | Refills: 0 | Status: SHIPPED | OUTPATIENT
Start: 2025-02-28

## 2025-02-28 RX ORDER — PHENTERMINE HYDROCHLORIDE 37.5 MG/1
37.5 TABLET ORAL DAILY
Qty: 30 TABLET | Refills: 0 | Status: SHIPPED | OUTPATIENT
Start: 2025-02-28

## 2025-02-28 NOTE — ASSESSMENT & PLAN NOTE
Not well-controlled.  I am going to start her on phentermine 37.5 mg daily.  Discussed with possible side effect.  Discussed about low-carb diet and regular exercise.    Orders:  •  phentermine (ADIPEX-P) 37.5 MG tablet; Take 1 tablet (37.5 mg total) by mouth in the morning

## 2025-02-28 NOTE — PROGRESS NOTES
Name: Miguelina Russo      : 1965      MRN: [id number]  Encounter Provider: Mike Leblanc MD  Encounter Date: 2025   Encounter department: [facility]    Assessment & Plan  Chronic bilateral low back pain with bilateral sciatica  Referral to spine and pain management.  Was given prescription for prednisone and Robaxin.  Discussed about stretching exercises.  Will continue to monitor.  Orders:  •  predniSONE 50 mg tablet; Take 1 tablet (50 mg total) by mouth daily for 5 days  •  Ambulatory referral to Spine & Pain Management; Future  •  methocarbamol (ROBAXIN) 500 mg tablet; Take 1 tablet (500 mg total) by mouth daily at bedtime    Obesity (BMI 30-39.9)  Not well-controlled.  I am going to start her on phentermine 37.5 mg daily.  Discussed with possible side effect.  Discussed about low-carb diet and regular exercise.    Orders:  •  phentermine (ADIPEX-P) 37.5 MG tablet; Take 1 tablet (37.5 mg total) by mouth in the morning    Bipolar 1 disorder (HCC)  Stable on Abilify, Cymbalta, Lamictal and trazodone. Continue same. Continue follow-up with psychiatrist.        Stage 3a chronic kidney disease (HCC)  Lab Results   Component Value Date    EGFR 57 2024    EGFR 72 2024    EGFR 53 2023    CREATININE 1.05 2024    CREATININE 0.87 2024    CREATININE 1.12 2023   GFR slightly low.  Discussed about increase oral hydration and we will continue to monitor GFR.            History of Present Illness     She is here today with complaint of low back pain radiating to her lower extremities bilateral has been going on for a long time but has been worse in the last few days.  She denies any weakness in her lower extremities.  She has history of back surgery years ago.  She also had x-ray of her lumbar spine in  showed degenerative disease.  She has been trying to lose weight but has not been successful.    Back Pain  Pertinent negatives include no  abdominal pain, chest pain, dysuria, fever or headaches.     Review of Systems   Constitutional:  Negative for chills and fever.   HENT:  Negative for trouble swallowing.    Eyes:  Negative for visual disturbance.   Respiratory:  Negative for cough and shortness of breath.    Cardiovascular:  Negative for chest pain, palpitations and leg swelling.   Gastrointestinal:  Negative for abdominal pain, constipation and diarrhea.   Endocrine: Negative for cold intolerance and heat intolerance.   Genitourinary:  Negative for difficulty urinating and dysuria.   Musculoskeletal:  Positive for back pain. Negative for gait problem.   Skin:  Negative for rash.   Neurological:  Negative for dizziness, tremors, seizures and headaches.   Hematological:  Negative for adenopathy.   Psychiatric/Behavioral:  Negative for behavioral problems.      Past Medical History:   Diagnosis Date   • Anxiety    • Colon polyp    • Depression    • GERD (gastroesophageal reflux disease)    • Ovarian cyst    • Urinary incontinence      Past Surgical History:   Procedure Laterality Date   • BACK SURGERY      fusion   • CERVICAL SPINE SURGERY      disc replaced   • COLONOSCOPY     • CYSTOSCOPY  03/13/2018        • OOPHORECTOMY Right    • ME LAPAROSCOPY SLING OPERATION STRESS INCONT N/A 04/24/2018    Procedure: INSERTION PUBOVAGINAL SLING (FEMALE) Trans obturator mid urethral sling insertion, CYSTOSCOPY;  Surgeon: James Mesa MD;  Location:  MAIN OR;  Service: Urology   • ME LAPAROSCOPY SURG CHOLECYSTECTOMY N/A 8/14/2024    Procedure: ADE LAPAROSCOPIC W/ ROBOTICS;  Surgeon: Mariya Cisneros MD;  Location: [facility];  Service: General     Family History   Problem Relation Age of Onset   • No Known Problems Mother    • No Known Problems Father    • No Known Problems Sister    • No Known Problems Sister    • No Known Problems Daughter    • No Known Problems Daughter    • No Known Problems Daughter    • No Known Problems Maternal  Grandmother    • No Known Problems Maternal Grandfather    • No Known Problems Paternal Grandmother    • No Known Problems Paternal Grandfather    • No Known Problems Paternal Aunt    • Breast cancer Neg Hx      Social History     Tobacco Use   • Smoking status: Every Day     Current packs/day: 1.00     Average packs/day: 1 pack/day for 44.2 years (44.2 ttl pk-yrs)     Types: Cigarettes     Start date: 1981     Passive exposure: Never   • Smokeless tobacco: Never   Vaping Use   • Vaping status: Never Used   Substance and Sexual Activity   • Alcohol use: Not Currently     Comment: social    • Drug use: No   • Sexual activity: Not Currently     Current Outpatient Medications on File Prior to Visit   Medication Sig   • ARIPiprazole (ABILIFY) 10 mg tablet TAKE ONE TABLET BY MOUTH DAILY AT BEDTIME   • buPROPion (Wellbutrin XL) 300 mg 24 hr tablet Take 1 tablet (300 mg total) by mouth every morning   • Calcium Carbonate (CALTRATE 600 PO) Take by mouth in the morning   • gabapentin (NEURONTIN) 400 mg capsule TAKE TWO CAPSULES BY MOUTH THREE TIMES A DAY   • hydrOXYzine HCL (ATARAX) 50 mg tablet TAKE ONE-HALF TO ONE TABLET BY MOUTH THREE TIMES A DAY AS NEEDED FOR ANXIETY   • lamoTRIgine (LaMICtal) 25 mg tablet TAKE 2 PO QAM AND TAKE EIGHT TABLETS BY MOUTH AT BEDTIME   • omeprazole (PriLOSEC) 20 mg delayed release capsule Take 1 capsule (20 mg total) by mouth daily   • omeprazole (PriLOSEC) 40 MG capsule TAKE ONE CAPSULE BY MOUTH EVERY DAY BEFORE BREAKFAST   • traZODone (DESYREL) 50 mg tablet TAKE THREE TABLETS BY MOUTH AT BEDTIME AS NEEDED     Allergies   Allergen Reactions   • Benadryl [Diphenhydramine] Hyperactivity   • Tylenol With Codeine #3 [Acetaminophen-Codeine] Hyperactivity   • Azithromycin Rash   • Erythromycin Rash     Immunization History   Administered Date(s) Administered   • INFLUENZA 11/17/2014, 12/02/2015, 01/24/2018   • Pneumococcal Conjugate Vaccine 20-valent (Pcv20), Polysace 06/09/2023     Objective   BP  "128/70 (BP Location: Left arm, Patient Position: Sitting, Cuff Size: Adult)   Pulse 77   Temp 97.6 °F (36.4 °C) (Tympanic)   Resp 16   Ht 5' 4\" (1.626 m)   Wt 83 kg (183 lb)   SpO2 96%   BMI 31.41 kg/m²     Physical Exam  Vitals and nursing note reviewed.   Constitutional:       Appearance: She is well-developed.   HENT:      Head: Normocephalic and atraumatic.   Eyes:      Pupils: Pupils are equal, round, and reactive to light.   Cardiovascular:      Rate and Rhythm: Normal rate and regular rhythm.      Heart sounds: Normal heart sounds.   Pulmonary:      Effort: Pulmonary effort is normal.      Breath sounds: Normal breath sounds.   Abdominal:      General: Bowel sounds are normal.      Palpations: Abdomen is soft.   Musculoskeletal:         General: Tenderness present.      Cervical back: Normal range of motion and neck supple.   Lymphadenopathy:      Cervical: No cervical adenopathy.   Skin:     General: Skin is warm.   Neurological:      Mental Status: She is alert and oriented to person, place, and time.         "

## 2025-02-28 NOTE — ASSESSMENT & PLAN NOTE
Referral to spine and pain management.  Was given prescription for prednisone and Robaxin.  Discussed about stretching exercises.  Will continue to monitor.  Orders:  •  predniSONE 50 mg tablet; Take 1 tablet (50 mg total) by mouth daily for 5 days  •  Ambulatory referral to Spine & Pain Management; Future  •  methocarbamol (ROBAXIN) 500 mg tablet; Take 1 tablet (500 mg total) by mouth daily at bedtime

## 2025-02-28 NOTE — ASSESSMENT & PLAN NOTE
Lab Results   Component Value Date    EGFR 57 12/13/2024    EGFR 72 03/09/2024    EGFR 53 07/14/2023    CREATININE 1.05 12/13/2024    CREATININE 0.87 03/09/2024    CREATININE 1.12 07/14/2023   GFR slightly low.  Discussed about increase oral hydration and we will continue to monitor GFR.

## 2025-03-03 NOTE — TELEPHONE ENCOUNTER
I sent a message to pt through her my chat that phentermine was not covered and she may go on [facility] to print a coupon for a discounted price

## 2025-03-03 NOTE — TELEPHONE ENCOUNTER
PA for Phentermine HCl 37.5MG tablets EXCLUDED from plan       Reason:(Screenshot if applicable)        Message sent to office clinical pool Yes

## 2025-03-03 NOTE — TELEPHONE ENCOUNTER
PA for Phentermine HCl 37.5MG tablets SUBMITTED to [facility]     via    [x]CMM-KEY:  (Key: [id number]   []Surescripts-Case ID #    []Availity-Auth ID #  NDC #     []Faxed to plan   []Other website    []Phone call Case ID #      [x]PA sent as URGENT    All office notes, labs and other pertaining documents and studies sent. Clinical questions answered. Awaiting determination from insurance company.     Turnaround time for your insurance to make a decision on your Prior Authorization can take 7-21 business days.

## 2025-03-06 DIAGNOSIS — F51.01 PRIMARY INSOMNIA: ICD-10-CM

## 2025-03-06 RX ORDER — TRAZODONE HYDROCHLORIDE 50 MG/1
TABLET ORAL
Qty: 270 TABLET | Refills: 0 | Status: SHIPPED | OUTPATIENT
Start: 2025-03-06

## 2025-03-07 ENCOUNTER — OFFICE VISIT (OUTPATIENT)
Dept: PAIN MEDICINE | Facility: CLINIC | Age: 60
End: 2025-03-07
Payer: COMMERCIAL

## 2025-03-07 ENCOUNTER — TELEMEDICINE (OUTPATIENT)
Dept: PSYCHIATRY | Facility: CLINIC | Age: 60
End: 2025-03-07
Payer: COMMERCIAL

## 2025-03-07 VITALS — WEIGHT: 183 LBS | BODY MASS INDEX: 31.24 KG/M2 | HEIGHT: 64 IN

## 2025-03-07 DIAGNOSIS — Z98.1 S/P LUMBAR SPINAL FUSION: ICD-10-CM

## 2025-03-07 DIAGNOSIS — M79.7 FIBROMYALGIA: ICD-10-CM

## 2025-03-07 DIAGNOSIS — M54.42 CHRONIC BILATERAL LOW BACK PAIN WITH BILATERAL SCIATICA: ICD-10-CM

## 2025-03-07 DIAGNOSIS — F31.62 BIPOLAR 1 DISORDER, MIXED, MODERATE (HCC): Primary | ICD-10-CM

## 2025-03-07 DIAGNOSIS — G89.29 CHRONIC BILATERAL LOW BACK PAIN WITH BILATERAL SCIATICA: ICD-10-CM

## 2025-03-07 DIAGNOSIS — M54.16 LUMBAR RADICULOPATHY: Primary | ICD-10-CM

## 2025-03-07 DIAGNOSIS — F41.9 ANXIETY: ICD-10-CM

## 2025-03-07 DIAGNOSIS — F51.01 PRIMARY INSOMNIA: ICD-10-CM

## 2025-03-07 DIAGNOSIS — M54.41 CHRONIC BILATERAL LOW BACK PAIN WITH BILATERAL SCIATICA: ICD-10-CM

## 2025-03-07 DIAGNOSIS — Z51.81 MEDICATION MONITORING ENCOUNTER: ICD-10-CM

## 2025-03-07 PROCEDURE — 99204 OFFICE O/P NEW MOD 45 MIN: CPT | Performed by: ANESTHESIOLOGY

## 2025-03-07 PROCEDURE — 99214 OFFICE O/P EST MOD 30 MIN: CPT | Performed by: PHYSICIAN ASSISTANT

## 2025-03-07 PROCEDURE — G2211 COMPLEX E/M VISIT ADD ON: HCPCS | Performed by: ANESTHESIOLOGY

## 2025-03-07 RX ORDER — HYDROXYZINE HYDROCHLORIDE 50 MG/1
TABLET, FILM COATED ORAL
Qty: 180 TABLET | Refills: 1 | Status: SHIPPED | OUTPATIENT
Start: 2025-03-07

## 2025-03-07 RX ORDER — GABAPENTIN 400 MG/1
800 CAPSULE ORAL 3 TIMES DAILY
Qty: 540 CAPSULE | Refills: 1 | Status: SHIPPED | OUTPATIENT
Start: 2025-03-07

## 2025-03-07 NOTE — PSYCH
Virtual Regular Visit    Patient is located at Home in the following state in which I hold an active license [place].    Assessment & Plan  Bipolar 1 disorder, mixed, moderate (HCC)  Moods are currently stable with medications and treatment plan  Wellbutrin  mg every morning  Lamictal 200 mg at night and 50 mg in the morning  Abilify 10 mg at night  Orders:    gabapentin (NEURONTIN) 400 mg capsule; Take 2 capsules (800 mg total) by mouth 3 (three) times a day    hydrOXYzine HCL (ATARAX) 50 mg tablet; TAKE ONE-HALF TO ONE TABLET BY MOUTH THREE TIMES A DAY AS NEEDED FOR ANXIETY    Medication monitoring encounter  Tolerating medications well without adverse effect       Primary insomnia  Stable with medications  Continue trazodone 50 mg 2:59 at night as needed       Anxiety  Stable with medications  Continue as needed hydroxyzine milligrams 1/2-1 3 times daily as needed  Orders:    hydrOXYzine HCL (ATARAX) 50 mg tablet; TAKE ONE-HALF TO ONE TABLET BY MOUTH THREE TIMES A DAY AS NEEDED FOR ANXIETY              Reason for visit is   Chief Complaint   Patient presents with    Follow-up    Medication Management        Visit Time  Visit Start Time: 1055  Visit Stop Time: 1115  Total Visit Duration: 20 minutes    Encounter provider: Clementine Campuzano PA-C  Provider located at [facility] [address] [address] [phone]    Recent Visits  Date Type Provider Dept   02/28/25 Office Visit Mike Leblanc MD [facility]   Showing recent visits within past 7 days and meeting all other requirements  Today's Visits  Date Type Provider Dept   03/07/25 Telemedicine [facility]   Showing today's visits and meeting all other requirements  Future Appointments  No visits were found meeting these conditions.  Showing future appointments within next 150 days and meeting all other  requirements       The patient was identified by name and date of birth. Miguelina Russo was informed that this is a telemedicine visit and that the visit is being conducted through the Epic Embedded platform. She agrees to proceed. My office door was closed. No one else was in the room. She acknowledged consent and understanding of privacy and security of the video platform. The patient has agreed to participate and understands they can discontinue the visit at any time. Patient is aware this is a billable service.     MEDICATION MANAGEMENT NOTE        [facility]      Name and Date of Birth:  Miguelina Russo 59 y.o. 1965 MRN: [id number]    Date of Visit: March 7, 2025     She will return in 1 to 2 months    - RTC in 1 month  - The patient was educated about 24 hour and weekend coverage for urgent situations accessed by calling [facility] main practice number  - Patient was educated to call [place] Suicide Prevention Lifeline (3-066-934-HXQF [2458]) for behavioral crisis at anytime or 911 for any safety concerns, or go to nearest ER if the symptoms become overwhelming or unmanageable.         Subjective        Miguelina Russo is a 59 y.o. female, visited for Follow-up and Medication Management, who was virtually seen and evaluated today at the [facility] outpatient clinic for follow-up and medication management. Completes psychiatric assessment without difficulty.     At previous outpatient psychiatric appointment with this writer, maintained on same medications.   She denies any current adverse medication side effects.      Miguelina reports that she has been stable with her moods.  She has been taking medications as prescribed.  States that she is typically sleeping well at night.  Appetite has been adequate.  States that she has not had any significant anxiety attacks or panic attacks recently.  Interest and  motivation has been stable.  Continues to enjoy embroidery.  She has a good supportive relationship with her spouse and family.  States that she has been having increased back pain and was seen today by pain management.  Reviewed medications and she was seen by her family doctor last week and started on phentermine for weight loss.  Discussed potential for this increasing anxiety/joe/insomnia.  She started 2 days ago and will monitor for adverse effects.  States that she is tolerating so far.      Review Of Systems:  Pertinent items are noted in HPI; all others are negative; no recent changes in medications or health status reported.    PHQ-2/9 Depression Screening                 Historical Information        Past Medical History:    Past Medical History:   Diagnosis Date    Anxiety     Colon polyp     Depression     GERD (gastroesophageal reflux disease)     Ovarian cyst     Urinary incontinence         Past Surgical History:   Procedure Laterality Date    BACK SURGERY      fusion    CERVICAL SPINE SURGERY      disc replaced    COLONOSCOPY      CYSTOSCOPY  03/13/2018         OOPHORECTOMY Right     RI LAPAROSCOPY SLING OPERATION STRESS INCONT N/A 04/24/2018    Procedure: INSERTION PUBOVAGINAL SLING (FEMALE) Trans obturator mid urethral sling insertion, CYSTOSCOPY;  Surgeon: James Mesa MD;  [facility];  Service: Urology    RI LAPAROSCOPY SURG CHOLECYSTECTOMY N/A 8/14/2024    Procedure: ADE LAPAROSCOPIC W/ ROBOTICS;  Surgeon: Mariya Cisneros MD;  Location[facility];  Service: General     Allergies   Allergen Reactions    Benadryl [Diphenhydramine] Hyperactivity    Tylenol With Codeine #3 [Acetaminophen-Codeine] Hyperactivity    Azithromycin Rash    Erythromycin Rash       Substance Abuse History:    Social History     Substance and Sexual Activity   Alcohol Use Not Currently    Comment: social      Social History     Substance and Sexual Activity   Drug Use No       Social  History:    Social History     Socioeconomic History    Marital status: /Civil Union     Spouse name: Not on file    Number of children: Not on file    Years of education: Not on file    Highest education level: Not on file   Occupational History    Not on file   Tobacco Use    Smoking status: Every Day     Current packs/day: 1.00     Average packs/day: 1 pack/day for 44.2 years (44.2 ttl pk-yrs)     Types: Cigarettes     Start date: 1981     Passive exposure: Never    Smokeless tobacco: Never   Vaping Use    Vaping status: Never Used   Substance and Sexual Activity    Alcohol use: Not Currently     Comment: social     Drug use: No    Sexual activity: Not Currently   Other Topics Concern    Not on file   Social History Narrative    Not on file     Social Drivers of Health     Financial Resource Strain: Not on file   Food Insecurity: Not on file   Transportation Needs: Not on file   Physical Activity: Not on file   Stress: Not on file   Social Connections: Not on file   Intimate Partner Violence: Not on file   Housing Stability: Not on file       Family Psychiatric History:     Family History   Problem Relation Age of Onset    No Known Problems Mother     No Known Problems Father     No Known Problems Sister     No Known Problems Sister     No Known Problems Daughter     No Known Problems Daughter     No Known Problems Daughter     No Known Problems Maternal Grandmother     No Known Problems Maternal Grandfather     No Known Problems Paternal Grandmother     No Known Problems Paternal Grandfather     No Known Problems Paternal Aunt     Breast cancer Neg Hx        History Review: The following portions of the patient's history were reviewed and updated as appropriate: allergies, current medications, past family history, past medical history, past social history, past surgical history and problem list.           Objective      Vital signs in last 24 hours:  There were no vitals filed for this visit.    Mental  Status Evaluation:    Appearance age appropriate, casually dressed   Behavior cooperative, calm   Speech normal rate, normal volume, normal pitch   Mood euthymic   Affect normal range and intensity, appropriate   Thought Processes organized, goal directed   Associations intact associations   Thought Content no overt delusions   Perceptual Disturbances: no auditory hallucinations, no visual hallucinations   Abnormal Thoughts  Risk Potential Suicidal ideation - None  Homicidal ideation - None  Potential for aggression - No   Orientation oriented to: person, place, time/date, and situation   Memory recent and remote memory grossly intact   Consciousness alert and awake   Attention Span Concentration Span attention span and concentration are age appropriate   Intellect appears to be of average intelligence   Insight intact   Judgement intact   Muscle Strength and  Gait unable to assess today due to virtual visit   Motor activity unable to assess today due to virtual visit   Language no difficulty naming common objects, no difficulty repeating a phrase   Fund of Knowledge adequate knowledge of current events  adequate fund of knowledge regarding past history  adequate fund of knowledge regarding vocabulary    Pain none   Pain Scale 0     Laboratory Results: I have personally reviewed all pertinent laboratory/tests results  CMP:   Lab Results   Component Value Date    SODIUM 142 12/13/2024    K 3.8 12/13/2024     12/13/2024    CO2 30 12/13/2024    AGAP 8 12/13/2024    BUN 9 12/13/2024    CREATININE 1.05 12/13/2024    GLUC 95 10/19/2022    GLUF 94 12/13/2024    CALCIUM 9.6 12/13/2024    AST 13 12/13/2024    ALT 11 12/13/2024    ALKPHOS 75 12/13/2024    TP 6.7 12/13/2024    ALB 4.5 12/13/2024    TBILI 0.56 12/13/2024    EGFR 57 12/13/2024               Current Outpatient Medications   Medication Sig Dispense Refill    ARIPiprazole (ABILIFY) 10 mg tablet TAKE ONE TABLET BY MOUTH DAILY AT BEDTIME 90 tablet 1     buPROPion (Wellbutrin XL) 300 mg 24 hr tablet Take 1 tablet (300 mg total) by mouth every morning 90 tablet 1    Calcium Carbonate (CALTRATE 600 PO) Take by mouth in the morning      gabapentin (NEURONTIN) 400 mg capsule TAKE TWO CAPSULES BY MOUTH THREE TIMES A  capsule 1    hydrOXYzine HCL (ATARAX) 50 mg tablet TAKE ONE-HALF TO ONE TABLET BY MOUTH THREE TIMES A DAY AS NEEDED FOR ANXIETY 90 tablet 1    lamoTRIgine (LaMICtal) 25 mg tablet TAKE 2 PO QAM AND TAKE EIGHT TABLETS BY MOUTH AT BEDTIME 900 tablet 1    methocarbamol (ROBAXIN) 500 mg tablet Take 1 tablet (500 mg total) by mouth daily at bedtime 30 tablet 0    omeprazole (PriLOSEC) 20 mg delayed release capsule Take 1 capsule (20 mg total) by mouth daily 90 capsule 3    omeprazole (PriLOSEC) 40 MG capsule TAKE ONE CAPSULE BY MOUTH EVERY DAY BEFORE BREAKFAST 90 capsule 1    phentermine (ADIPEX-P) 37.5 MG tablet Take 1 tablet (37.5 mg total) by mouth in the morning 30 tablet 0    traZODone (DESYREL) 50 mg tablet TAKE THREE TABLETS BY MOUTH DAILY AT BEDTIME AS NEEDED 270 tablet 0     No current facility-administered medications for this visit.         Medications Risks/Benefits      Risks, Benefits And Possible Side Effects Of Medications:    Risks, benefits, and possible side effects of medications explained to Miguelina and she verbalizes understanding and agreement for treatment.    Controlled Medication Discussion:     Not applicable    Psychotherapy Provided:     Individual psychotherapy provided: No       Treatment Plan:    Completed and signed during the session: Not applicable - Treatment Plan not due at this session    Note Share    This note was not shared with the patient due to reasonable likelihood of causing patient harm    Clementine Campuzano PA-C 03/07/25    This note was completed in part utilizing Dragon dictation Software. Grammatical, translation, syntax errors, random word insertions, spelling mistakes, and incomplete sentences may be an  occasional consequence of this system secondary to software limitations with voice recognition, ambient noise, and hardware issues. If you have any questions or concerns about the content, text, or information contained within the body of this dictation, please contact the provider for clarification.

## 2025-03-07 NOTE — PROGRESS NOTES
Assessment  1. Lumbar radiculopathy    2. Chronic bilateral low back pain with bilateral sciatica    3. S/P lumbar spinal fusion      Patient presenting with chronic back and radiating leg pains for greater than 6 months.  Pain is consistent with lumbar radicular pain, lumbar spondylosis accompanied by consistent moderate pain on the pain scale (3-4+/10) with inability to participate in IADLs for >6 weeks. Patient has participated with physical therapy as well as home exercises and stretches.  Patient has tried Voltaren gel, ibuprofen, Robaxin, gabapentin, oral steroids with modest benefit. Denies any bowel or bladder incontinence, saddle anesthesia.    In regards to the patient's pathology, we discussed the various treatment options including physical therapy, chiropractic treatment, medication management, activity modifications, interventional spine procedures.  Given that patient has not had any benefit with conservative treatments, I think patient would benefit from targeted interventional treatment modalities.    Independently reviewed and interpreted prior lumbar XR from 2021 - this disc cages at L1-2 and L4-5 with significant disc space narrowing and discogenic changes at L2-3.     Plan:    Given worsening back pain with radicular features, recommended to obtain updated Xrs and MRI to evaluate for nerve root compression.    Patient defers PT for now given history of lumbar spine fusion and unexplained cause of worsening symptoms.    Will f/u after MRI to discuss possible interventional options.    Reviewed PCP office notes.    Reviewed hemoglobin A1c, renal function, CBC and/or PT/INR prior to discussing/offering interventional modalities.    [place] Prescription Drug Monitoring Program report was reviewed and was appropriate     My impressions and treatment recommendations were discussed in detail with the patient who verbalized understanding and had no further questions.  Discharge instructions were  "provided. I personally saw and examined the patient and I agree with the above discussed plan of care.    Orders Placed This Encounter   Procedures    MRI lumbar spine w wo contrast     Standing Status:   Future     Expected Date:   3/7/2025     Expiration Date:   3/7/2029     Scheduling Instructions:      There is no preparation for this test. Please leave your jewelry and valuables at home, wedding rings are the exception. All patients will be required to change into a hospital gown and pants.  Street clothes are not permitted in the MRI.  Magnetic nail polish must be removed prior to arrival for your test. Please bring your insurance cards, a form of photo ID and a list of your medications with you. Arrive 15 minutes prior to your appointment time in order to register. Please bring any prior CT or MRI studies of this area that were not performed at a [facility].            To schedule this appointment, please contact Central Scheduling at [phone].            Prior to your appointment, please make sure you complete the MRI Screening Form when you e-Check in for your appointment. This will be available starting 7 days before your appointment in [employer]. You may receive an e-mail with an activation code if you do not have a [employer] account. If you do not have access to a device, we will complete your screening at your appointment.     Reason for Exam:   lumbar radiculopathy, history of lumbar fusion     For OP exams needed \"URGENT\", choose the appropriate timeframe below and call Central Scheduling at [phone]. No need to speak with a Radiologist.:   Not URGENT     What is the patient's sedation requirement?:   No Sedation     Does the patient need medication for Claustrophobia? If yes, order medication at this point.:   No     Does the patient wear a life vest, have an implanted cardiac device, a stimulation device, a sleep apnea stimulator, or a breast tissue expansion device?:   No     Please " evaluate if any patient has any of the following risk factors that require renal function labs within 90 days prior to the MRI appointment.:   None of the Above     Release to patient through [facility]:   Immediate    XR spine lumbar minimum 4 views non injury     Flex ext     Standing Status:   Future     Number of Occurrences:   1     Expected Date:   3/7/2025     Expiration Date:   3/7/2029     Scheduling Instructions:      Bring along any outside films relating to this procedure.          Basic metabolic panel     This is a patient instruction: Patient fasting for 8 hours or longer recommended.     Standing Status:   Future     Expiration Date:   5/7/2026     No orders of the defined types were placed in this encounter.      History of Present Illness    Miguelina Russo is a 59 y.o. female presenting for new patient visit at [facility] for exam and evaluation of chronic back and radiating leg pain for greater than 6 months. Pain started without any precipitating injury or trauma. Over the past month, the intensity of pain has been Moderate. Pain is currently 3-4/10. Pain does interfere with age appropriate activities of daily living. Pain is constant and is described as burning, cramping, sharp, with pins/needles. Patient endorses weakness in the lower extremities. Assistance device used: None.    Worsening factors noted: lying down, standing, bending, sitting, walking, coughing, sneezing.   Improving factors noted: n/a.    Treatments tried:   Heat/ice: yes  PT: yes  Chiropractic therapy: yes  Injections: yes   Previous spine surgery: yes    Anticoagulation: no    Medications tried:   Voltaren gel, ibuprofen, Robaxin, oral steroids, Tramadol    I have personally reviewed and/or updated the patient's past medical history, past surgical history, family history, social history, current medications, allergies, and vital signs today.     Review of Systems   Constitutional:  Negative for  chills and fever.   HENT:  Negative for ear pain and sore throat.    Eyes:  Negative for pain and visual disturbance.   Respiratory:  Negative for cough and shortness of breath.    Cardiovascular:  Negative for chest pain and palpitations.   Gastrointestinal:  Negative for abdominal pain and vomiting.   Genitourinary:  Negative for dysuria and hematuria.   Musculoskeletal:  Positive for arthralgias, back pain, gait problem, joint swelling and myalgias.   Skin:  Negative for color change and rash.   Neurological:  Positive for dizziness, weakness and headaches. Negative for seizures and syncope.   Psychiatric/Behavioral:  Positive for agitation and decreased concentration.    All other systems reviewed and are negative.      Patient Active Problem List   Diagnosis    Stress incontinence of urine    Bipolar 1 disorder (HCC)    Fibromyalgia    Cellulitis of right lower extremity    Chronic bilateral low back pain without sciatica    MDD (major depressive disorder), recurrent severe, without psychosis (HCC)    Panic disorder    Other hyperlipidemia    Asymptomatic menopause    Vitamin D insufficiency    Stage 3a chronic kidney disease (HCC)    Hyperglycemia    Epigastric pain    RUQ pain    Gallstones    Common bile duct cyst    Chronic bilateral low back pain with bilateral sciatica    Obesity (BMI 30-39.9)       Past Medical History:   Diagnosis Date    Anxiety     Colon polyp     Depression     GERD (gastroesophageal reflux disease)     Ovarian cyst     Urinary incontinence        Past Surgical History:   Procedure Laterality Date    BACK SURGERY      fusion    CERVICAL SPINE SURGERY      disc replaced    COLONOSCOPY      CYSTOSCOPY  03/13/2018         OOPHORECTOMY Right     [place] LAPAROSCOPY SLING OPERATION STRESS INCONT N/A 04/24/2018    Procedure: INSERTION PUBOVAGINAL SLING (FEMALE) Trans obturator mid urethral sling insertion, CYSTOSCOPY;  Surgeon: James Mesa MD;  [facility];  Service:  Urology    [place] LAPAROSCOPY SURG CHOLECYSTECTOMY N/A 8/14/2024    Procedure: ADE LAPAROSCOPIC W/ ROBOTICS;  Surgeon: Mariya Cisneros MD;  [facility];  Service: General       Family History   Problem Relation Age of Onset    No Known Problems Mother     No Known Problems Father     No Known Problems Sister     No Known Problems Sister     No Known Problems Daughter     No Known Problems Daughter     No Known Problems Daughter     No Known Problems Maternal Grandmother     No Known Problems Maternal Grandfather     No Known Problems Paternal Grandmother     No Known Problems Paternal Grandfather     No Known Problems Paternal Aunt     Breast cancer Neg Hx        Social History     Occupational History    Not on file   Tobacco Use    Smoking status: Every Day     Current packs/day: 1.00     Average packs/day: 1 pack/day for 44.2 years (44.2 ttl pk-yrs)     Types: Cigarettes     Start date: 1981     Passive exposure: Never    Smokeless tobacco: Never   Vaping Use    Vaping status: Never Used   Substance and Sexual Activity    Alcohol use: Not Currently     Comment: social     Drug use: No    Sexual activity: Not Currently       Current Outpatient Medications on File Prior to Visit   Medication Sig    ARIPiprazole (ABILIFY) 10 mg tablet TAKE ONE TABLET BY MOUTH DAILY AT BEDTIME    buPROPion (Wellbutrin XL) 300 mg 24 hr tablet Take 1 tablet (300 mg total) by mouth every morning    Calcium Carbonate (CALTRATE 600 PO) Take by mouth in the morning    gabapentin (NEURONTIN) 400 mg capsule TAKE TWO CAPSULES BY MOUTH THREE TIMES A DAY    hydrOXYzine HCL (ATARAX) 50 mg tablet TAKE ONE-HALF TO ONE TABLET BY MOUTH THREE TIMES A DAY AS NEEDED FOR ANXIETY    lamoTRIgine (LaMICtal) 25 mg tablet TAKE 2 PO QAM AND TAKE EIGHT TABLETS BY MOUTH AT BEDTIME    methocarbamol (ROBAXIN) 500 mg tablet Take 1 tablet (500 mg total) by mouth daily at bedtime    omeprazole (PriLOSEC) 20 mg delayed release capsule Take 1 capsule (20 mg  "total) by mouth daily    omeprazole (PriLOSEC) 40 MG capsule TAKE ONE CAPSULE BY MOUTH EVERY DAY BEFORE BREAKFAST    phentermine (ADIPEX-P) 37.5 MG tablet Take 1 tablet (37.5 mg total) by mouth in the morning    traZODone (DESYREL) 50 mg tablet TAKE THREE TABLETS BY MOUTH DAILY AT BEDTIME AS NEEDED     No current facility-administered medications on file prior to visit.       Allergies   Allergen Reactions    Benadryl [Diphenhydramine] Hyperactivity    Tylenol With Codeine #3 [Acetaminophen-Codeine] Hyperactivity    Azithromycin Rash    Erythromycin Rash       Physical Exam    Ht 5' 4\" (1.626 m)   Wt 83 kg (183 lb)   BMI 31.41 kg/m²     Constitutional: normal, well developed, well nourished, alert, in no distress and non-toxic and no overt pain behavior.  Eyes: anicteric  HEENT: grossly intact  Neck: supple, symmetric, trachea midline and no masses   Pulmonary:even and unlabored  Cardiovascular:No edema or pitting edema present  Skin:Normal without rashes or lesions and well hydrated  Psychiatric:Mood and affect appropriate  Neurologic: Motor function is grossly intact with no focal neurologic deficits   Musculoskeletal: limited lumbar spine ROM    Imaging  XR lumbar spine 1/8/21:  Five-view lumbar spine     Indication: Arthralgia     Five-view study was done and compared to 05/18/2017. The patient has 5 lumbar   vertebrae. Lumbar vertebral body heights are maintained and I do not see a   fracture. Surgical hardware is again identified and appears unchanged. There is   a surgical plate on the left side at the L1-2 level with transversely oriented   screws through L1 and L2. Disc cage is seen at the L1-2 level. There is a   surgical plate on the left side at the L4-5 level with transversely oriented   screws through L4 and L5. Disc cage is seen at the L4-5 level. There is   posterior hardware overlying the spinous processes of L1 and L2 and of L4 and   L5.     There is new marked narrowing of the L2-3 disc with " significant degenerative   subchondral discogenic sclerosis and new grade 1 retrolisthesis at the L2-3   level. There is new mild dextroscoliosis.

## 2025-03-09 ENCOUNTER — APPOINTMENT (OUTPATIENT)
Dept: RADIOLOGY | Age: 60
End: 2025-03-09
Payer: COMMERCIAL

## 2025-03-09 ENCOUNTER — OFFICE VISIT (OUTPATIENT)
Dept: URGENT CARE | Age: 60
End: 2025-03-09
Payer: COMMERCIAL

## 2025-03-09 VITALS
HEART RATE: 85 BPM | OXYGEN SATURATION: 96 % | DIASTOLIC BLOOD PRESSURE: 66 MMHG | RESPIRATION RATE: 18 BRPM | TEMPERATURE: 97.3 F | SYSTOLIC BLOOD PRESSURE: 126 MMHG

## 2025-03-09 DIAGNOSIS — M25.571 ACUTE BILATERAL ANKLE PAIN: ICD-10-CM
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
## 2025-03-09 DIAGNOSIS — M25.572 ACUTE BILATERAL ANKLE PAIN: ICD-10-CM

## 2025-03-09 DIAGNOSIS — S82.831A CLOSED FRACTURE OF DISTAL END OF RIGHT FIBULA, UNSPECIFIED FRACTURE MORPHOLOGY, INITIAL ENCOUNTER: Primary | ICD-10-CM

## 2025-03-09 DIAGNOSIS — S82.302A CLOSED FRACTURE OF DISTAL END OF LEFT TIBIA, UNSPECIFIED FRACTURE MORPHOLOGY, INITIAL ENCOUNTER: ICD-10-CM

## 2025-03-09 PROCEDURE — 73610 X-RAY EXAM OF ANKLE: CPT

## 2025-03-09 PROCEDURE — 29515 APPLICATION SHORT LEG SPLINT: CPT

## 2025-03-09 PROCEDURE — S9083 URGENT CARE CENTER GLOBAL: HCPCS

## 2025-03-09 PROCEDURE — 73630 X-RAY EXAM OF FOOT: CPT

## 2025-03-09 PROCEDURE — 99214 OFFICE O/P EST MOD 30 MIN: CPT

## 2025-03-09 NOTE — PATIENT INSTRUCTIONS
Your xray report will have a final reading by a radiologist in the next 24 hours. If there is anything abnormal, the staff will be in contact with you regarding an updated plan of care.    Rest and elevate the affected extremity.    Use the brace/splint/wrap as ordered and directed.     Ice the affected area 4-6 times per day for a period of 15-20 minutes each time. DO NOT place an ice pack against your bare skin.    After the first 48-72 hours you may alternate heat and ice or use what is most comfortable for you.    You may use topical pain agent(s) of choice and according to package directions (lidocaine patches, biofreeze, salonpas, icy hot, etc.). PLEASE BE SURE TO REMOVE THE REMNANTS OF THESE ITEMS PRIOR TO USING A HEATING PAD TO THE AREA AS YOU CAN SUSTAIN A BURN TO YOUR SKIN!!    Follow up with orthopedics in 5-7 days if your symptoms are not improving

## 2025-03-09 NOTE — PROGRESS NOTES
[facility]        NAME: Miguelina Russo is a 59 y.o. female  : 1965    MRN: [id number]  DATE: 2025  TIME: 4:12 PM    Assessment and Plan   Closed fracture of distal end of right fibula, unspecified fracture morphology, initial encounter [S82.831A]  1. Closed fracture of distal end of right fibula, unspecified fracture morphology, initial encounter  Ambulatory Referral to Orthopedic Surgery    Orthopedic injury treatment      2. Closed fracture of distal end of left tibia, unspecified fracture morphology, initial encounter  Ambulatory Referral to Orthopedic Surgery      3. Acute bilateral ankle pain  XR foot 3+ vw right    Ambulatory Referral to Orthopedic Surgery    CANCELED: XR foot 2 vw right      4. Fall, initial encounter  XR ankle 3+ vw right    XR ankle 3+ vw left    XR foot 3+ vw right    Ambulatory Referral to Orthopedic Surgery    CANCELED: XR foot 2 vw right        Provider Radiology Interpretation (preliminary)   Final results will be as per official Radiology Report when available:   LEFT ANKLE: distal tibia avulsion fracture   RIGHT ANKLE: distal fibula fracture   RIGHT FOOT: negative     1510: pt was able to ambulate from exam room 3 to xray room with technician with slow steady gait    Pt placed in cam walker boot to LLE and instructed on use. Spouse present at bedside and reviewed application and removal of cam walker boot with patient and spouse. Follow up with orthopedics - call for appt tomorrow. Ice and elevate the left and right ankle/foot. No weight bearing on RLE, minimal weight bearing on LLE until seen by orthopedics. Motrin and tylenol as needed for pain control.   OCL Splint applied to right lower extremity by provider. See procedure notes for details. Pt and spouse instructed on this splint. Informed patient and spouse this is unable to be removed and unable to get wet.     Patient Instructions       Follow up with PCP in 3-5 days.  Proceed to  ER if symptoms  "worsen.    If tests are performed, our office will contact you with results only if changes need to made to the care plan discussed with you at the visit. You can review your full results on [facility].    Chief Complaint     Chief Complaint   Patient presents with    Fall     Patient reports she missed a step and fell a couple days ago. Bilateral ankle pain right greater than left.         History of Present Illness       59 year old female presents to this clinic with complaint of bilateral ankle pain. She reports a few days ago, she was coming down the steps at home and missed the last step. She thinks she twisted the right ankle first and then the left one when she was landing. Pain is \"all around\" from the inside to the outside of the ankles. Right ankle is worse than left ankle. She has been elevating, icing, and taking otc medication for pain relief.     Fall        Review of Systems   Review of Systems   Musculoskeletal:  Positive for arthralgias and joint swelling.   Skin:  Positive for color change.         Current Medications       Current Outpatient Medications:     ARIPiprazole (ABILIFY) 10 mg tablet, TAKE ONE TABLET BY MOUTH DAILY AT BEDTIME, Disp: 90 tablet, Rfl: 1    buPROPion (Wellbutrin XL) 300 mg 24 hr tablet, Take 1 tablet (300 mg total) by mouth every morning, Disp: 90 tablet, Rfl: 1    Calcium Carbonate (CALTRATE 600 PO), Take by mouth in the morning, Disp: , Rfl:     gabapentin (NEURONTIN) 400 mg capsule, Take 2 capsules (800 mg total) by mouth 3 (three) times a day, Disp: 540 capsule, Rfl: 1    hydrOXYzine HCL (ATARAX) 50 mg tablet, TAKE ONE-HALF TO ONE TABLET BY MOUTH THREE TIMES A DAY AS NEEDED FOR ANXIETY, Disp: 180 tablet, Rfl: 1    lamoTRIgine (LaMICtal) 25 mg tablet, TAKE 2 PO QAM AND TAKE EIGHT TABLETS BY MOUTH AT BEDTIME, Disp: 900 tablet, Rfl: 1    methocarbamol (ROBAXIN) 500 mg tablet, Take 1 tablet (500 mg total) by mouth daily at bedtime, Disp: 30 tablet, Rfl: 0    " omeprazole (PriLOSEC) 20 mg delayed release capsule, Take 1 capsule (20 mg total) by mouth daily, Disp: 90 capsule, Rfl: 3    omeprazole (PriLOSEC) 40 MG capsule, TAKE ONE CAPSULE BY MOUTH EVERY DAY BEFORE BREAKFAST, Disp: 90 capsule, Rfl: 1    phentermine (ADIPEX-P) 37.5 MG tablet, Take 1 tablet (37.5 mg total) by mouth in the morning, Disp: 30 tablet, Rfl: 0    traZODone (DESYREL) 50 mg tablet, TAKE THREE TABLETS BY MOUTH DAILY AT BEDTIME AS NEEDED, Disp: 270 tablet, Rfl: 0    Current Allergies     Allergies as of 03/09/2025 - Reviewed 03/09/2025   Allergen Reaction Noted    Benadryl [diphenhydramine] Hyperactivity 06/04/2024    Tylenol with codeine #3 [acetaminophen-codeine] Hyperactivity 02/23/2024    Azithromycin Rash 01/29/2018    Erythromycin Rash 04/24/2015            The following portions of the patient's history were reviewed and updated as appropriate: allergies, current medications, past family history, past medical history, past social history, past surgical history and problem list.     Past Medical History:   Diagnosis Date    Anxiety     Colon polyp     Depression     GERD (gastroesophageal reflux disease)     Ovarian cyst     Urinary incontinence        Past Surgical History:   Procedure Laterality Date    BACK SURGERY      fusion    CERVICAL SPINE SURGERY      disc replaced    COLONOSCOPY      CYSTOSCOPY  03/13/2018         OOPHORECTOMY Right     MT LAPAROSCOPY SLING OPERATION STRESS INCONT N/A 04/24/2018    Procedure: INSERTION PUBOVAGINAL SLING (FEMALE) Trans obturator mid urethral sling insertion, CYSTOSCOPY;  Surgeon: James Mesa MD;  Location:  MAIN OR;  Service: Urology    MT LAPAROSCOPY SURG CHOLECYSTECTOMY N/A 8/14/2024    Procedure: ADE LAPAROSCOPIC W/ ROBOTICS;  Surgeon: Mariya Cisneros MD;  Location: [facility];  Service: General       Family History   Problem Relation Age of Onset    No Known Problems Mother     No Known Problems Father     No Known Problems Sister      No Known Problems Sister     No Known Problems Daughter     No Known Problems Daughter     No Known Problems Daughter     No Known Problems Maternal Grandmother     No Known Problems Maternal Grandfather     No Known Problems Paternal Grandmother     No Known Problems Paternal Grandfather     No Known Problems Paternal Aunt     Breast cancer Neg Hx          Medications have been verified.        Objective   /66   Pulse 85   Temp (!) 97.3 °F (36.3 °C)   Resp 18   SpO2 96%        Physical Exam     Physical Exam  Vitals and nursing note reviewed.   Constitutional:       Appearance: Normal appearance. She is obese.   HENT:      Head: Normocephalic and atraumatic.   Cardiovascular:      Rate and Rhythm: Normal rate and regular rhythm.      Pulses: Normal pulses.      Heart sounds: Normal heart sounds.   Pulmonary:      Breath sounds: Normal breath sounds.   Musculoskeletal:         General: Swelling and tenderness present. No deformity.        Feet:       Comments: Bruising, swelling and tenderness to palpation; decreased ROM and strength due to pain; pain in medial and lateral ankle areas with inversion, eversion, plantar flexion and dorsiflexion of each foot; bruising noted to right anterior superior foot and behind the toes 3-5.    Skin:     General: Skin is warm and dry.      Capillary Refill: Capillary refill takes less than 2 seconds.      Findings: Bruising present.   Neurological:      General: No focal deficit present.      Mental Status: She is alert and oriented to person, place, and time.           Orthopedic injury treatment    Date/Time: 3/9/2025 3:55 PM    Performed by: OLEGARIO Nguyen  Authorized by: OLEGARIO Nguyen    Patient Location:  [facility]  [place] Protocol:  procedure performed by consultantConsent: Written consent obtained.  Risks and benefits: risks, benefits and alternatives were discussed  Consent given by: patient  Time out: Immediately prior to procedure a  "\"time out\" was called to verify the correct patient, procedure, equipment, support staff and site/side marked as required.  Patient understanding: patient states understanding of the procedure being performed  Patient consent: the patient's understanding of the procedure matches consent given  Procedure consent: procedure consent matches procedure scheduled  Patient identity confirmed: verbally with patient    Injury location:  Ankle  Location details:  Right ankle  Injury type:  Fracture  Fracture type: lateral malleolus    Fracture type: lateral malleolus    Neurovascular status: Neurovascularly intact    Distal perfusion: normal    Range of motion: reduced    Local anesthesia used?: No    General anesthesia used?: No    Manipulation performed?: No    Immobilization:  Splint  Splint type:  Sugar tong  Cast type:  Short leg  Supplies used:  Cotton padding, elastic bandage and fiberglass  Neurovascular status: Neurovascularly intact    Distal perfusion: normal    Neurological function: normal    Range of motion: unchanged    Patient tolerance:  Patient tolerated the procedure well with no immediate complications            "

## 2025-03-10 DIAGNOSIS — Z51.81 MEDICATION MONITORING ENCOUNTER: ICD-10-CM

## 2025-03-10 DIAGNOSIS — M79.7 FIBROMYALGIA: ICD-10-CM

## 2025-03-10 DIAGNOSIS — F41.9 ANXIETY: ICD-10-CM

## 2025-03-10 DIAGNOSIS — F31.62 BIPOLAR 1 DISORDER, MIXED, MODERATE (HCC): ICD-10-CM

## 2025-03-11 ENCOUNTER — OFFICE VISIT (OUTPATIENT)
Age: 60
End: 2025-03-11
Payer: COMMERCIAL

## 2025-03-11 ENCOUNTER — APPOINTMENT (OUTPATIENT)
Age: 60
End: 2025-03-11
Payer: COMMERCIAL

## 2025-03-11 DIAGNOSIS — S82.831A CLOSED FRACTURE OF DISTAL END OF RIGHT FIBULA, UNSPECIFIED FRACTURE MORPHOLOGY, INITIAL ENCOUNTER: Primary | ICD-10-CM

## 2025-03-11 DIAGNOSIS — S93.402A SPRAIN OF UNSPECIFIED LIGAMENT OF LEFT ANKLE, INITIAL ENCOUNTER: ICD-10-CM
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
## 2025-03-11 DIAGNOSIS — M25.571 ACUTE BILATERAL ANKLE PAIN: ICD-10-CM

## 2025-03-11 DIAGNOSIS — M25.572 ACUTE BILATERAL ANKLE PAIN: ICD-10-CM

## 2025-03-11 DIAGNOSIS — S82.831A CLOSED FRACTURE OF DISTAL END OF RIGHT FIBULA, UNSPECIFIED FRACTURE MORPHOLOGY, INITIAL ENCOUNTER: ICD-10-CM

## 2025-03-11 PROCEDURE — 99203 OFFICE O/P NEW LOW 30 MIN: CPT | Performed by: ORTHOPAEDIC SURGERY

## 2025-03-11 PROCEDURE — 73610 X-RAY EXAM OF ANKLE: CPT

## 2025-03-11 RX ORDER — DULOXETIN HYDROCHLORIDE 20 MG/1
20 CAPSULE, DELAYED RELEASE ORAL DAILY
Qty: 30 CAPSULE | Refills: 1 | OUTPATIENT
Start: 2025-03-11

## 2025-03-11 RX ORDER — DULOXETIN HYDROCHLORIDE 30 MG/1
30 CAPSULE, DELAYED RELEASE ORAL DAILY
Qty: 30 CAPSULE | Refills: 1 | OUTPATIENT
Start: 2025-03-11

## 2025-03-11 NOTE — PROGRESS NOTES
:  Assessment & Plan  Closed fracture of distal end of right fibula, unspecified fracture morphology, initial encounter    Has stable RIGHT distal fibula fracture, ambulated for >1 week without any displacement of medial joint space  CAM boot for RIGHT ankle/foot  Can ambulate in CAM boot    Has LEFT ankle sprain/stable dorsal talus avulsion that has resolved, not painful today  Offered ankle brace for the left side but she feels better without it, as she is not having pain on that side      Follow up 6 weeks, will repeat xrays stop boot and start PT at that time          REASON FOR VISIT:   Chief Complaint   Patient presents with    Left Ankle - Pain    Right Ankle - Pain       HISTORY OF PRESENT ILLNESS:  RIGHT ankle pain    3/1/25, missed step and felt pain in the RIGHT ankle, mild in the left side also    Has been ambulating  Seen in urgent care, xrays showed:  RIGHT distal fibula  LEFT dorsal avulsion     Was placed into a splint on the right foot and boot on the left    Today the left ankle feels fine, the right side has minimal pain    Past Medical History:   Diagnosis Date    Anxiety     Colon polyp     Depression     GERD (gastroesophageal reflux disease)     Ovarian cyst     Urinary incontinence         Past Surgical History:   Procedure Laterality Date    BACK SURGERY      fusion    CERVICAL SPINE SURGERY      disc replaced    COLONOSCOPY      CYSTOSCOPY  03/13/2018         OOPHORECTOMY Right     NH LAPAROSCOPY SLING OPERATION STRESS INCONT N/A 04/24/2018    Procedure: INSERTION PUBOVAGINAL SLING (FEMALE) Trans obturator mid urethral sling insertion, CYSTOSCOPY;  Surgeon: James Mesa MD;  Location: [facility];  Service: Urology    NH LAPAROSCOPY SURG CHOLECYSTECTOMY N/A 8/14/2024    Procedure: ADE LAPAROSCOPIC W/ ROBOTICS;  Surgeon: Mariya Cisneros MD;  Location: [facility];  Service: General       Social History     Socioeconomic History    Marital status: /Civil Union     Spouse  name: Not on file    Number of children: Not on file    Years of education: Not on file    Highest education level: Not on file   Occupational History    Not on file   Tobacco Use    Smoking status: Every Day     Current packs/day: 1.00     Average packs/day: 1 pack/day for 44.2 years (44.2 ttl pk-yrs)     Types: Cigarettes     Start date: 1981     Passive exposure: Never    Smokeless tobacco: Never   Vaping Use    Vaping status: Never Used   Substance and Sexual Activity    Alcohol use: Not Currently     Comment: social     Drug use: No    Sexual activity: Not Currently   Other Topics Concern    Not on file   Social History Narrative    Not on file     Social Drivers of Health     Financial Resource Strain: Not on file   Food Insecurity: Not on file   Transportation Needs: Not on file   Physical Activity: Not on file   Stress: Not on file   Social Connections: Not on file   Intimate Partner Violence: Not on file   Housing Stability: Not on file       MEDICATIONS:    Current Outpatient Medications:     ARIPiprazole (ABILIFY) 10 mg tablet, TAKE ONE TABLET BY MOUTH DAILY AT BEDTIME, Disp: 90 tablet, Rfl: 1    buPROPion (Wellbutrin XL) 300 mg 24 hr tablet, Take 1 tablet (300 mg total) by mouth every morning, Disp: 90 tablet, Rfl: 1    Calcium Carbonate (CALTRATE 600 PO), Take by mouth in the morning, Disp: , Rfl:     gabapentin (NEURONTIN) 400 mg capsule, Take 2 capsules (800 mg total) by mouth 3 (three) times a day, Disp: 540 capsule, Rfl: 1    hydrOXYzine HCL (ATARAX) 50 mg tablet, TAKE ONE-HALF TO ONE TABLET BY MOUTH THREE TIMES A DAY AS NEEDED FOR ANXIETY, Disp: 180 tablet, Rfl: 1    lamoTRIgine (LaMICtal) 25 mg tablet, TAKE 2 PO QAM AND TAKE EIGHT TABLETS BY MOUTH AT BEDTIME, Disp: 900 tablet, Rfl: 1    methocarbamol (ROBAXIN) 500 mg tablet, Take 1 tablet (500 mg total) by mouth daily at bedtime, Disp: 30 tablet, Rfl: 0    omeprazole (PriLOSEC) 20 mg delayed release capsule, Take 1 capsule (20 mg total) by mouth  daily, Disp: 90 capsule, Rfl: 3    omeprazole (PriLOSEC) 40 MG capsule, TAKE ONE CAPSULE BY MOUTH EVERY DAY BEFORE BREAKFAST, Disp: 90 capsule, Rfl: 1    phentermine (ADIPEX-P) 37.5 MG tablet, Take 1 tablet (37.5 mg total) by mouth in the morning, Disp: 30 tablet, Rfl: 0    traZODone (DESYREL) 50 mg tablet, TAKE THREE TABLETS BY MOUTH DAILY AT BEDTIME AS NEEDED, Disp: 270 tablet, Rfl: 0    ALLERGIES:  Allergies   Allergen Reactions    Benadryl [Diphenhydramine] Hyperactivity    Tylenol With Codeine #3 [Acetaminophen-Codeine] Hyperactivity    Azithromycin Rash    Erythromycin Rash       MAJOR FINDINGS:    RIGHT lower extremity  Skin intact,  Ankle DF: neutral, PF: 30  Moderate lateral bruising  No significant lateral tenderness  no tenderness over navicular, base of 5th MT, no medial tenderness   No proximal fibular tenderness, no knee tenderness  Negative squeeze test   Sensation intact sp/dp/t  Strength intact 5/5 dorsiflexion/plantarflexion   Extremity wwp    LEFT lower extremity  Skin intact,  Ankle DF: neutral, PF: 30  No lateral tenderness  Minimal anterior tenderness  no tenderness over navicular, base of 5th MT, no medial tenderness   No proximal fibular tenderness, no knee tenderness  Anterior drawer: plantarflexion- stable, dorsiflexion-stable  Negative squeeze test   Sensation intact sp/dp/t  Strength intact 5/5 dorsiflexion/plantarflexion   Extremity wwp      IMAGING  I personally reviewed and interpreted the imaging studies  X-rays performed on the RIGHT ankle show stable distal fibula fracture  X rays of the LEFT ankle show dorsal talus avulsion injury       CC:  MD Ion Cormier, Sulema Burnette, CR*

## 2025-03-11 NOTE — ASSESSMENT & PLAN NOTE
Has stable RIGHT distal fibula fracture, ambulated for >1 week without any displacement of medial joint space  CAM boot for RIGHT ankle/foot  Can ambulate in CAM boot    Has LEFT ankle sprain/stable dorsal talus avulsion that has resolved, not painful today  Offered ankle brace for the left side but she feels better without it, as she is not having pain on that side      Follow up 6 weeks, will repeat xrays stop boot and start PT at that time

## 2025-03-21 ENCOUNTER — HOSPITAL ENCOUNTER (OUTPATIENT)
Dept: MRI IMAGING | Facility: HOSPITAL | Age: 60
End: 2025-03-21
Attending: ANESTHESIOLOGY
Payer: COMMERCIAL

## 2025-03-21 DIAGNOSIS — M54.16 LUMBAR RADICULOPATHY: ICD-10-CM

## 2025-03-21 PROCEDURE — A9585 GADOBUTROL INJECTION: HCPCS | Performed by: ANESTHESIOLOGY

## 2025-03-21 PROCEDURE — 72158 MRI LUMBAR SPINE W/O & W/DYE: CPT

## 2025-03-21 RX ORDER — GADOBUTROL 604.72 MG/ML
8 INJECTION INTRAVENOUS
Status: COMPLETED | OUTPATIENT
Start: 2025-03-21 | End: 2025-03-21

## 2025-03-21 RX ADMIN — GADOBUTROL 8 ML: 604.72 INJECTION INTRAVENOUS at 07:59

## 2025-03-29 DIAGNOSIS — F31.32 BIPOLAR DISORDER WITH MODERATE DEPRESSION (HCC): ICD-10-CM

## 2025-03-29 DIAGNOSIS — F51.01 PRIMARY INSOMNIA: ICD-10-CM

## 2025-03-29 DIAGNOSIS — K21.9 GASTROESOPHAGEAL REFLUX DISEASE WITHOUT ESOPHAGITIS: ICD-10-CM

## 2025-03-29 NOTE — LETTER
Miguelina Russo  [address] [place] [id number]      4/3/2025    Dear Miguelina Russo,    Review of our records shows you are due for the following:    Follow Up Office Visit    Please call to schedule your appointment. We look forward to hearing from you.    Sincerely,    [facility]  [phone]

## 2025-03-31 DIAGNOSIS — E66.9 OBESITY (BMI 30-39.9): ICD-10-CM

## 2025-03-31 RX ORDER — ARIPIPRAZOLE 10 MG/1
10 TABLET ORAL
Qty: 90 TABLET | Refills: 0 | Status: SHIPPED | OUTPATIENT
Start: 2025-03-31

## 2025-03-31 RX ORDER — TRAZODONE HYDROCHLORIDE 50 MG/1
TABLET ORAL
Qty: 270 TABLET | Refills: 0 | Status: SHIPPED | OUTPATIENT
Start: 2025-03-31

## 2025-03-31 RX ORDER — OMEPRAZOLE 20 MG/1
20 CAPSULE, DELAYED RELEASE ORAL DAILY
Qty: 90 CAPSULE | Refills: 0 | Status: SHIPPED | OUTPATIENT
Start: 2025-03-31

## 2025-04-02 ENCOUNTER — TELEPHONE (OUTPATIENT)
Age: 60
End: 2025-04-02

## 2025-04-02 RX ORDER — PHENTERMINE HYDROCHLORIDE 37.5 MG/1
37.5 TABLET ORAL EVERY MORNING
Qty: 30 TABLET | Refills: 0 | Status: SHIPPED | OUTPATIENT
Start: 2025-04-02

## 2025-04-02 NOTE — TELEPHONE ENCOUNTER
Pharmacy requesting refill on Phentermine   Last seen 2/28/25  Has appt 4/11/25     1 MIGUELINA RUSSO 1965 F [address] [facility] [place] [address] [place]      Search Criteria  Name Date of Birth Date Range  Miguelina Russo 1965 04- To 04-  Requester Name Requested Date  FRANCES MINGO 04- 13:17:56 ([facility])  Summary  Prescriptions  3  Prescribers  3  Pharmacies  1  Drug Classes  Benzodiazepines  0  Stimulants  1  Opioids  2  Muscle Relaxants  0  Opioid Dosage  Total MME for Active Prescriptions  0    Average MME  39.00         Prescriptions  Notifications    Prescribers  Pharmacies  MME Graph    Show All     [place]   Drug Categories:      Stimulants       Opioids     Show  10  entries  Search:  Patient Id Prescription # Filled Written Drug Label Qty Days Strength MME** Prescriber Pharmacy Payment REFILL #/Auth State Detail  1 [id number] 02/28/2025 02/28/2025 Phentermine Hcl (Tablet) 30.0 30 37.5 MG [id number] Commercial [facility] 0 / 0 [place]   1 [id number] 10/24/2024 10/24/2024 traMADol HCL (Tablet) 12.0 3 50 MG 40.0 CHRISTOPHER SMITH [id number] Commercial Insurance 0 / 0 [place]   1 [id number] 08/14/2024 08/14/2024 oxyCODONE HCL (Tablet) 10.0 2 5 MG 37.50 TITA MARTINEZ [id number] Com

## 2025-04-02 NOTE — TELEPHONE ENCOUNTER
Left message for patient to call me back DIRECTLY to harris.  Left my DIRECT phone # [phone] on message.

## 2025-04-03 ENCOUNTER — RA CDI HCC (OUTPATIENT)
Dept: OTHER | Facility: HOSPITAL | Age: 60
End: 2025-04-03

## 2025-04-11 ENCOUNTER — OFFICE VISIT (OUTPATIENT)
Dept: FAMILY MEDICINE CLINIC | Facility: CLINIC | Age: 60
End: 2025-04-11
Payer: COMMERCIAL

## 2025-04-11 VITALS
SYSTOLIC BLOOD PRESSURE: 112 MMHG | RESPIRATION RATE: 16 BRPM | BODY MASS INDEX: 30.25 KG/M2 | OXYGEN SATURATION: 96 % | DIASTOLIC BLOOD PRESSURE: 78 MMHG | HEIGHT: 64 IN | TEMPERATURE: 96.1 F | WEIGHT: 177.2 LBS | HEART RATE: 91 BPM

## 2025-04-11 DIAGNOSIS — N18.31 STAGE 3A CHRONIC KIDNEY DISEASE (HCC): ICD-10-CM

## 2025-04-11 DIAGNOSIS — K21.9 GASTROESOPHAGEAL REFLUX DISEASE WITHOUT ESOPHAGITIS: ICD-10-CM

## 2025-04-11 DIAGNOSIS — R73.9 HYPERGLYCEMIA: ICD-10-CM

## 2025-04-11 DIAGNOSIS — S82.831D CLOSED FRACTURE OF DISTAL END OF RIGHT FIBULA WITH ROUTINE HEALING, UNSPECIFIED FRACTURE MORPHOLOGY, SUBSEQUENT ENCOUNTER: ICD-10-CM

## 2025-04-11 DIAGNOSIS — E78.49 OTHER HYPERLIPIDEMIA: ICD-10-CM

## 2025-04-11 DIAGNOSIS — F31.9 BIPOLAR 1 DISORDER (HCC): ICD-10-CM

## 2025-04-11 DIAGNOSIS — E66.9 OBESITY (BMI 30-39.9): Primary | ICD-10-CM

## 2025-04-11 DIAGNOSIS — F33.2 MDD (MAJOR DEPRESSIVE DISORDER), RECURRENT SEVERE, WITHOUT PSYCHOSIS (HCC): ICD-10-CM

## 2025-04-11 PROCEDURE — 99214 OFFICE O/P EST MOD 30 MIN: CPT | Performed by: FAMILY MEDICINE

## 2025-04-11 PROCEDURE — G2211 COMPLEX E/M VISIT ADD ON: HCPCS | Performed by: FAMILY MEDICINE

## 2025-04-11 RX ORDER — OMEPRAZOLE 40 MG/1
40 CAPSULE, DELAYED RELEASE ORAL
Qty: 90 CAPSULE | Refills: 1 | Status: SHIPPED | OUTPATIENT
Start: 2025-04-11

## 2025-04-11 NOTE — ASSESSMENT & PLAN NOTE
Currently on phentermine 37.5 mg  Has lost 6 lbs since her last visit 6 months ago.   Continue the same   Discussed healthy diet and regular excercise  Will continue to monitor   Follow-up in 3 months.

## 2025-04-11 NOTE — PROGRESS NOTES
Name: Miguelina Russo      : 1965      MRN: [id number]  Encounter Provider: Mike Leblanc MD  Encounter Date: 2025   Encounter department: [facility]  :  Assessment & Plan  Obesity (BMI 30-39.9)    Currently on phentermine 37.5 mg  Has lost 6 lbs since her last visit 6 months ago.   Continue the same   Discussed healthy diet and regular excercise  Will continue to monitor   Follow-up in 3 months.        Bipolar 1 disorder (HCC)  Currently stable on Lamictal, Abilify, Cymbalta, and Trazodone.  Continue following with psychiatrist.        MDD (major depressive disorder), recurrent severe, without psychosis (HCC)  Currently stable on lamictal, abilify, cymbalta, and trazadone.   Continue following with psychiatrist       Hyperglycemia  Currently stable.   Discussed low-carb diet and regular exercise.  Will continue to monitor with fasting glucose and A1c.   Orders:  •  Hemoglobin A1C; Future    Other hyperlipidemia  Currently well-controlled without medication.   Continue the same.   Discussed low-fat diet and regular exercise.   Will continue to monitor with fasting lipid panel.   Orders:  •  CBC and differential; Future  •  Comprehensive metabolic panel; Future  •  Lipid Panel with Direct LDL reflex; Future  •  TSH, 3rd generation with Free T4 reflex; Future    Stage 3a chronic kidney disease (HCC)  Lab Results   Component Value Date    EGFR 57 2024    EGFR 72 2024    EGFR 53 2023    CREATININE 1.05 2024    CREATININE 0.87 2024    CREATININE 1.12 2023     Encouraged adequate oral hydration.   Will continue to monitor.        Closed fracture of distal end of right fibula with routine healing, unspecified fracture morphology, subsequent encounter  Missed a step and fell on 3/1/25.  Currently has CAM boot for right ankle/foot.  Continue following with orthopedic surgery.        Gastroesophageal reflux disease without  "esophagitis    Orders:  •  omeprazole (PriLOSEC) 40 MG capsule; Take 1 capsule (40 mg total) by mouth daily before breakfast           History of Present Illness   Pt is a 60 y/o female w PMH of HTN, HLD, CKD, BPD, MDD, and hyperglycemia here today for follow-up. She reports she is doing well overall. She is accompanied by her  in the office today. No active complaints at this time. She reports taking all medications as directed and denies any side effects. She has lost 6 lbs since starting phentermine at her last appointment 6 weeks ago. Blood pressure in the office today is 112/78. No recent labs to review.         Review of Systems   Constitutional:  Negative for chills and fever.   HENT:  Negative for ear pain and sore throat.    Eyes:  Negative for pain and visual disturbance.   Respiratory:  Negative for cough and shortness of breath.    Cardiovascular:  Negative for chest pain and palpitations.   Gastrointestinal:  Negative for abdominal pain and vomiting.   Genitourinary:  Negative for dysuria and hematuria.   Musculoskeletal:  Negative for arthralgias and back pain.   Skin:  Negative for color change and rash.   Neurological:  Negative for seizures and syncope.   All other systems reviewed and are negative.      Objective   /78 (BP Location: Left arm, Patient Position: Sitting, Cuff Size: Standard)   Pulse 91   Temp (!) 96.1 °F (35.6 °C) (Tympanic)   Resp 16   Ht 5' 4\" (1.626 m)   Wt 80.4 kg (177 lb 3.2 oz)   SpO2 96%   BMI 30.42 kg/m²      Physical Exam  Vitals and nursing note reviewed.   Constitutional:       General: She is not in acute distress.     Appearance: She is well-developed.   HENT:      Head: Normocephalic and atraumatic.   Eyes:      Conjunctiva/sclera: Conjunctivae normal.   Cardiovascular:      Rate and Rhythm: Normal rate and regular rhythm.      Heart sounds: No murmur heard.  Pulmonary:      Effort: Pulmonary effort is normal. No respiratory distress.      Breath " sounds: Normal breath sounds.   Abdominal:      Palpations: Abdomen is soft.      Tenderness: There is no abdominal tenderness.   Musculoskeletal:         General: No swelling.      Cervical back: Neck supple.   Skin:     General: Skin is warm and dry.      Capillary Refill: Capillary refill takes less than 2 seconds.   Neurological:      Mental Status: She is alert.   Psychiatric:         Mood and Affect: Mood normal.

## 2025-04-11 NOTE — ASSESSMENT & PLAN NOTE
Lab Results   Component Value Date    EGFR 57 12/13/2024    EGFR 72 03/09/2024    EGFR 53 07/14/2023    CREATININE 1.05 12/13/2024    CREATININE 0.87 03/09/2024    CREATININE 1.12 07/14/2023     Encouraged adequate oral hydration.   Will continue to monitor.

## 2025-04-11 NOTE — ASSESSMENT & PLAN NOTE
Currently stable on Lamictal, Abilify, Cymbalta, and Trazodone.  Continue following with psychiatrist.

## 2025-04-11 NOTE — ASSESSMENT & PLAN NOTE
Missed a step and fell on 3/1/25.  Currently has CAM boot for right ankle/foot.  Continue following with orthopedic surgery.

## 2025-04-11 NOTE — ASSESSMENT & PLAN NOTE
Currently stable.   Discussed low-carb diet and regular exercise.  Will continue to monitor with fasting glucose and A1c.   Orders:  •  Hemoglobin A1C; Future

## 2025-04-11 NOTE — ASSESSMENT & PLAN NOTE
Currently stable on lamictal, abilify, cymbalta, and trazadone.   Continue following with psychiatrist

## 2025-04-11 NOTE — ASSESSMENT & PLAN NOTE
Currently well-controlled without medication.   Continue the same.   Discussed low-fat diet and regular exercise.   Will continue to monitor with fasting lipid panel.   Orders:  •  CBC and differential; Future  •  Comprehensive metabolic panel; Future  •  Lipid Panel with Direct LDL reflex; Future  •  TSH, 3rd generation with Free T4 reflex; Future

## 2025-04-18 ENCOUNTER — HOSPITAL ENCOUNTER (OUTPATIENT)
Dept: RADIOLOGY | Facility: HOSPITAL | Age: 60
Discharge: HOME | End: 2025-04-18
Attending: SURGERY
Payer: COMMERCIAL

## 2025-04-18 ENCOUNTER — APPOINTMENT (OUTPATIENT)
Age: 60
End: 2025-04-18
Attending: ORTHOPAEDIC SURGERY
Payer: COMMERCIAL

## 2025-04-18 ENCOUNTER — OFFICE VISIT (OUTPATIENT)
Dept: PAIN MEDICINE | Facility: CLINIC | Age: 60
End: 2025-04-18
Payer: COMMERCIAL

## 2025-04-18 ENCOUNTER — OFFICE VISIT (OUTPATIENT)
Age: 60
End: 2025-04-18
Payer: COMMERCIAL

## 2025-04-18 VITALS — WEIGHT: 177 LBS | BODY MASS INDEX: 30.22 KG/M2 | HEIGHT: 64 IN

## 2025-04-18 DIAGNOSIS — M54.16 LUMBAR RADICULOPATHY: Primary | ICD-10-CM

## 2025-04-18 DIAGNOSIS — M48.061 SPINAL STENOSIS OF LUMBAR REGION, UNSPECIFIED WHETHER NEUROGENIC CLAUDICATION PRESENT: ICD-10-CM

## 2025-04-18 DIAGNOSIS — S82.831A CLOSED FRACTURE OF DISTAL END OF RIGHT FIBULA, UNSPECIFIED FRACTURE MORPHOLOGY, INITIAL ENCOUNTER: ICD-10-CM

## 2025-04-18 DIAGNOSIS — K83.5: ICD-10-CM

## 2025-04-18 DIAGNOSIS — S82.831A CLOSED FRACTURE OF DISTAL END OF RIGHT FIBULA, UNSPECIFIED FRACTURE MORPHOLOGY, INITIAL ENCOUNTER: Primary | ICD-10-CM

## 2025-04-18 PROCEDURE — 99214 OFFICE O/P EST MOD 30 MIN: CPT | Performed by: ANESTHESIOLOGY

## 2025-04-18 PROCEDURE — G2211 COMPLEX E/M VISIT ADD ON: HCPCS | Performed by: ANESTHESIOLOGY

## 2025-04-18 PROCEDURE — A9585 GADOBUTROL INJECTION: HCPCS | Performed by: FAMILY MEDICINE

## 2025-04-18 PROCEDURE — 74183 MRI ABD W/O CNTR FLWD CNTR: CPT

## 2025-04-18 PROCEDURE — 99213 OFFICE O/P EST LOW 20 MIN: CPT | Performed by: ORTHOPAEDIC SURGERY

## 2025-04-18 PROCEDURE — 73610 X-RAY EXAM OF ANKLE: CPT

## 2025-04-18 RX ORDER — GADOBUTROL 604.72 MG/ML
8 INJECTION INTRAVENOUS
Status: COMPLETED | OUTPATIENT
Start: 2025-04-18 | End: 2025-04-18

## 2025-04-18 RX ADMIN — GADOBUTROL 8 ML: 604.72 INJECTION INTRAVENOUS at 17:30

## 2025-04-18 NOTE — PROGRESS NOTES
:  Assessment & Plan  Closed fracture of distal end of right fibula, unspecified fracture morphology, initial encounter        Xrays today reveal delayed healing with minimal displacement  Patient was placed in boot at last visit, was unable to tolerated because of discomfort - only able to wear for 3 weeks then stopped using boot  Continues to experience pain    Referral to podiatry sent for further evaluation of fracture, discussed possible need for surgery of this  Continue OTC pain meds PRN   Follow up PRN       REASON FOR VISIT:   Chief Complaint   Patient presents with    Right Foot - Follow-up       INTERVAL Hx (4/18/25)  Patient still experiencing moderate amounts of pain   Wore the boot for only 3 weeks due to it being uncomfortable   Stopped wearing the boot for the past 3 weeks     Pain has improved since initial injury, but still very bothersome   The past 3 weeks she has noticed no improvement of symptoms  Having constant pain       HISTORY OF PRESENT ILLNESS:  RIGHT ankle pain    3/1/25, missed step and felt pain in the RIGHT ankle, mild in the left side also    Has been ambulating  Seen in urgent care, xrays showed:  RIGHT distal fibula  LEFT dorsal avulsion     Was placed into a splint on the right foot and boot on the left    Today the left ankle feels fine, the right side has minimal pain    Past Medical History:   Diagnosis Date    Anxiety     Colon polyp     Depression     GERD (gastroesophageal reflux disease)     Ovarian cyst     Urinary incontinence         Past Surgical History:   Procedure Laterality Date    BACK SURGERY      fusion    CERVICAL SPINE SURGERY      disc replaced    COLONOSCOPY      CYSTOSCOPY  03/13/2018         OOPHORECTOMY Right     IN LAPAROSCOPY SLING OPERATION STRESS INCONT N/A 04/24/2018    Procedure: INSERTION PUBOVAGINAL SLING (FEMALE) Trans obturator mid urethral sling insertion, CYSTOSCOPY;  Surgeon: James Mesa MD;  Location: [facility];  Service:  Urology    [place] LAPAROSCOPY SURG CHOLECYSTECTOMY N/A 8/14/2024    Procedure: ADE LAPAROSCOPIC W/ ROBOTICS;  Surgeon: Mariya Cisneros MD;  Location: [facility];  Service: General       Social History     Socioeconomic History    Marital status: /Civil Union     Spouse name: Not on file    Number of children: Not on file    Years of education: Not on file    Highest education level: Not on file   Occupational History    Not on file   Tobacco Use    Smoking status: Every Day     Current packs/day: 1.00     Average packs/day: 1 pack/day for 44.3 years (44.3 ttl pk-yrs)     Types: Cigarettes     Start date: 1981     Passive exposure: Never    Smokeless tobacco: Never   Vaping Use    Vaping status: Never Used   Substance and Sexual Activity    Alcohol use: Not Currently     Comment: social     Drug use: No    Sexual activity: Not Currently   Other Topics Concern    Not on file   Social History Narrative    Not on file     Social Drivers of Health     Financial Resource Strain: Not on file   Food Insecurity: Not on file   Transportation Needs: Not on file   Physical Activity: Not on file   Stress: Not on file   Social Connections: Not on file   Intimate Partner Violence: Not on file   Housing Stability: Not on file       MEDICATIONS:    Current Outpatient Medications:     ARIPiprazole (ABILIFY) 10 mg tablet, TAKE 1 TABLET BY MOUTH DAILY AT BEDTIME, Disp: 90 tablet, Rfl: 0    buPROPion (Wellbutrin XL) 300 mg 24 hr tablet, Take 1 tablet (300 mg total) by mouth every morning, Disp: 90 tablet, Rfl: 1    Calcium Carbonate (CALTRATE 600 PO), Take by mouth in the morning, Disp: , Rfl:     gabapentin (NEURONTIN) 400 mg capsule, Take 2 capsules (800 mg total) by mouth 3 (three) times a day, Disp: 540 capsule, Rfl: 1    hydrOXYzine HCL (ATARAX) 50 mg tablet, TAKE ONE-HALF TO ONE TABLET BY MOUTH THREE TIMES A DAY AS NEEDED FOR ANXIETY, Disp: 180 tablet, Rfl: 1    lamoTRIgine (LaMICtal) 25 mg tablet, TAKE 2 PO QAM AND TAKE  EIGHT TABLETS BY MOUTH AT BEDTIME, Disp: 900 tablet, Rfl: 1    methocarbamol (ROBAXIN) 500 mg tablet, Take 1 tablet (500 mg total) by mouth daily at bedtime, Disp: 30 tablet, Rfl: 0    omeprazole (PriLOSEC) 20 mg delayed release capsule, TAKE ONE CAPSULE BY MOUTH EVERY DAY (Patient not taking: Reported on 4/11/2025), Disp: 90 capsule, Rfl: 0    omeprazole (PriLOSEC) 40 MG capsule, Take 1 capsule (40 mg total) by mouth daily before breakfast, Disp: 90 capsule, Rfl: 1    phentermine (ADIPEX-P) 37.5 MG tablet, TAKE ONE TABLET BY MOUTH EVERY DAY IN THE MORNING, Disp: 30 tablet, Rfl: 0    traZODone (DESYREL) 50 mg tablet, TAKE THREE TABLETS BY MOUTH DAILY AT BEDTIME AS NEEDED, Disp: 270 tablet, Rfl: 0    ALLERGIES:  Allergies   Allergen Reactions    Benadryl [Diphenhydramine] Hyperactivity    Tylenol With Codeine #3 [Acetaminophen-Codeine] Hyperactivity    Azithromycin Rash    Erythromycin Rash       MAJOR FINDINGS:    RIGHT lower extremity  Skin intact,  Ankle DF: neutral, PF: 30  No evidence of bruising   + significant lateral tenderness  no tenderness over navicular, base of 5th MT, + medial tenderness   No proximal fibular tenderness, no knee tenderness  Negative squeeze test   Sensation intact sp/dp/t  Strength intact 5/5 dorsiflexion/plantarflexion   Extremity wwp      IMAGING  I personally reviewed and interpreted the imaging studies  X-rays performed on the RIGHT ankle show distal fibula fracture with minimal displacement compared to prior xray, there is mild callus formation     X rays of the LEFT ankle show dorsal talus avulsion injury       CC:  Mike Leblanc MD No ref. provider found

## 2025-04-18 NOTE — PROGRESS NOTES
Assessment:  1. Lumbar radiculopathy    2. Spinal stenosis of lumbar region, unspecified whether neurogenic claudication present      Patient presenting with chronic back and bilateral radiating leg pains for greater than 6 months.    Pain is consistent with lumbar radicular pain, lumbar spinal stenosis, lumbar spondylosis accompanied by consistent moderate pain on the pain scale (3-4+/10) with inability to participate in IADLs for >6 weeks. Patient has participated with physical therapy as well as home exercises and stretches.  Patient has tried Voltaren gel, ibuprofen, Robaxin, gabapentin, oral steroids with modest benefit. Denies any bowel or bladder incontinence, saddle anesthesia.     In regards to the patient's pathology, we discussed the various treatment options including physical therapy, chiropractic treatment, medication management, activity modifications, interventional spine procedures.  Given that patient has not had any benefit with conservative treatments, I think patient would benefit from targeted interventional treatment modalities.     Independently reviewed and interpreted prior lumbar MRI - this shows moderate spinal stenosis at L2-3 and moderate to severe spinal stenosis at L3-4. Spinal fusions noted at L1-2 and L4-5.     Plan:     Given persistent back pain with radicular features with significant stenosis on MRI, discussed and offered bilateral L3 TF ESIs.  The procedures, its risks, and benefits were explained in detail to the patient. Risks include but are not limited to bleeding, infection, hematoma formation, abscess formation, weakness, headache, failure the pain to improve, nerve irritation or damage, and potential worsening of the pain.  The approach was demonstrated using models and literature was provided. The patient verbalized understanding and wished to proceed with the procedure.      Will follow up 1 month after TF DANIELLE.    Reviewed hemoglobin A1c, renal function, CBC and/or  PT/INR prior to discussing/offering interventional modalities.     My impressions and treatment recommendations were discussed in detail with the patient who verbalized understanding and had no further questions.  Discharge instructions were provided. I personally saw and examined the patient and I agree with the above discussed plan of care.    Orders Placed This Encounter   Procedures    FL spine and pain procedure     Standing Status:   Future     Expected Date:   4/18/2025     Expiration Date:   4/18/2029     Reason for Exam::   bilateral L3 TF DANIELLE     Anticoagulant hold needed?:   no     No orders of the defined types were placed in this encounter.      History of Present Illness:  Miguelina Russo is a 59 y.o. female who presents for a follow up office visit in regards to Back Pain (MRI review).   The patient’s current symptoms include continued back and radiating leg pain symptoms. Pain is rated 2/10 currently and can increase to 5+/10 depending on time of day and activity.  Pain is described as a constant sharp, radiating pain throughout the day.    I have personally reviewed and/or updated the patient's past medical history, past surgical history, family history, social history, current medications, allergies, and vital signs today.     Review of Systems   Respiratory:  Negative for shortness of breath.    Cardiovascular:  Negative for chest pain.   Gastrointestinal:  Negative for constipation, diarrhea, nausea and vomiting.   Musculoskeletal:  Positive for back pain. Negative for arthralgias, gait problem, joint swelling and myalgias.   Skin:  Negative for rash.   Neurological:  Negative for dizziness, seizures and weakness.   All other systems reviewed and are negative.      Patient Active Problem List   Diagnosis    Stress incontinence of urine    Bipolar 1 disorder (HCC)    Fibromyalgia    Cellulitis of right lower extremity    Chronic bilateral low back pain without sciatica    MDD (major depressive  disorder), recurrent severe, without psychosis (HCC)    Panic disorder    Other hyperlipidemia    Asymptomatic menopause    Vitamin D insufficiency    Stage 3a chronic kidney disease (HCC)    Hyperglycemia    Epigastric pain    RUQ pain    Gallstones    Common bile duct cyst    Chronic bilateral low back pain with bilateral sciatica    Obesity (BMI 30-39.9)    Closed fracture of distal end of right fibula, unspecified fracture morphology, initial encounter    Closed fracture of distal end of right fibula with routine healing    Gastroesophageal reflux disease without esophagitis       Past Medical History:   Diagnosis Date    Anxiety     Colon polyp     Depression     GERD (gastroesophageal reflux disease)     Ovarian cyst     Urinary incontinence        Past Surgical History:   Procedure Laterality Date    BACK SURGERY      fusion    CERVICAL SPINE SURGERY      disc replaced    COLONOSCOPY      CYSTOSCOPY  03/13/2018         OOPHORECTOMY Right     IN LAPAROSCOPY SLING OPERATION STRESS INCONT N/A 04/24/2018    Procedure: INSERTION PUBOVAGINAL SLING (FEMALE) Trans obturator mid urethral sling insertion, CYSTOSCOPY;  Surgeon: James Mesa MD;  Location:  [facility];  Service: Urology    IN LAPAROSCOPY SURG CHOLECYSTECTOMY N/A 8/14/2024    Procedure: ADE LAPAROSCOPIC W/ ROBOTICS;  Surgeon: Mariya Cisneros MD;  Location: [facility];  Service: General       Family History   Problem Relation Age of Onset    No Known Problems Mother     No Known Problems Father     No Known Problems Sister     No Known Problems Sister     No Known Problems Daughter     No Known Problems Daughter     No Known Problems Daughter     No Known Problems Maternal Grandmother     No Known Problems Maternal Grandfather     No Known Problems Paternal Grandmother     No Known Problems Paternal Grandfather     No Known Problems Paternal Aunt     Breast cancer Neg Hx        Social History     Occupational History    Not on file  "  Tobacco Use    Smoking status: Every Day     Current packs/day: 1.00     Average packs/day: 1 pack/day for 44.3 years (44.3 ttl pk-yrs)     Types: Cigarettes     Start date: 1981     Passive exposure: Never    Smokeless tobacco: Never   Vaping Use    Vaping status: Never Used   Substance and Sexual Activity    Alcohol use: Not Currently     Comment: social     Drug use: No    Sexual activity: Not Currently       Current Outpatient Medications on File Prior to Visit   Medication Sig    ARIPiprazole (ABILIFY) 10 mg tablet TAKE 1 TABLET BY MOUTH DAILY AT BEDTIME    buPROPion (Wellbutrin XL) 300 mg 24 hr tablet Take 1 tablet (300 mg total) by mouth every morning    Calcium Carbonate (CALTRATE 600 PO) Take by mouth in the morning    gabapentin (NEURONTIN) 400 mg capsule Take 2 capsules (800 mg total) by mouth 3 (three) times a day    hydrOXYzine HCL (ATARAX) 50 mg tablet TAKE ONE-HALF TO ONE TABLET BY MOUTH THREE TIMES A DAY AS NEEDED FOR ANXIETY    lamoTRIgine (LaMICtal) 25 mg tablet TAKE 2 PO QAM AND TAKE EIGHT TABLETS BY MOUTH AT BEDTIME    methocarbamol (ROBAXIN) 500 mg tablet Take 1 tablet (500 mg total) by mouth daily at bedtime    omeprazole (PriLOSEC) 20 mg delayed release capsule TAKE ONE CAPSULE BY MOUTH EVERY DAY (Patient not taking: Reported on 4/11/2025)    omeprazole (PriLOSEC) 40 MG capsule Take 1 capsule (40 mg total) by mouth daily before breakfast    phentermine (ADIPEX-P) 37.5 MG tablet TAKE ONE TABLET BY MOUTH EVERY DAY IN THE MORNING    traZODone (DESYREL) 50 mg tablet TAKE THREE TABLETS BY MOUTH DAILY AT BEDTIME AS NEEDED     No current facility-administered medications on file prior to visit.       Allergies   Allergen Reactions    Benadryl [Diphenhydramine] Hyperactivity    Tylenol With Codeine #3 [Acetaminophen-Codeine] Hyperactivity    Azithromycin Rash    Erythromycin Rash       Physical Exam:    Ht 5' 4\" (1.626 m)   Wt 80.3 kg (177 lb)   BMI 30.38 kg/m²     Constitutional:normal, well " developed, well nourished, alert, in no distress and non-toxic and no overt pain behavior.  Eyes:anicteric  HEENT:grossly intact  Neck:supple, symmetric, trachea midline and no masses   Pulmonary:even and unlabored  Cardiovascular:No edema or pitting edema present  Skin:Normal without rashes or lesions and well hydrated  Psychiatric:Mood and affect appropriate  Neurologic: Motor function is grossly intact with no focal neurologic deficits   Musculoskeletal: Limited lumbar spine range of motion     Imaging  MRI LUMBAR SPINE WITH AND WITHOUT CONTRAST     INDICATION: M54.16: Radiculopathy, lumbar region.     COMPARISON: Lumbar spine radiograph 3/7/2025.     TECHNIQUE:  Multiplanar, multisequence imaging of the lumbar spine was performed before and after gadolinium administration. .        IV Contrast:  8 mL of Gadobutrol injection     IMAGE QUALITY: Motion-degraded, which reduces diagnostic sensitivity.     FINDINGS:     VERTEBRAL BODIES: Anterior and posterior fusion devices are noted at L1-L2 and L4-L5, noting these result in artifact that degrade evaluation of the adjacent structures. Dextroscoliosis.     SACRUM: No acute abnormality.     DISTAL CORD AND CONUS:  Normal size and signal within the distal cord and conus.     PARASPINAL SOFT TISSUES: Fatty atrophy of the paraspinal musculature.     LOWER THORACIC DISC SPACES: Spondylotic changes without acute critical central canal stenosis.     LUMBAR DISC SPACES: Multilevel degenerative disc disease is greatest and marked at L2-L3.     L1-L2: Disc bulge. Mild bilateral neuroforaminal narrowing. Mild spinal canal stenosis.     L2-L3: Moderate facet arthropathy with ligamentum flavum hypertrophy. Moderate bilateral neuroforaminal narrowing. Moderate-marked spinal canal stenosis.     L3-L4: Disc bulge. Marked facet arthropathy with ligamentum flavum hypertrophy. Mild to moderate bilateral neuroforaminal narrowing. Moderate-marked spinal canal stenosis.     L4-L5:  Moderate facet arthropathy. Mild bilateral neuroforaminal narrowing. No significant spinal canal stenosis.     L5-S1: Disc bulge, eccentric to the right. Moderate facet arthropathy. No significant neuroforaminal narrowing or spinal canal stenosis.     POSTCONTRAST IMAGING:  No abnormal enhancement.     IMPRESSION:     Anterior and posterior fusion at L1-L2 and L4-L5. Multilevel advanced spondylotic changes of the lumbar spine, noting moderate-marked spinal canal stenosis at L2-L3 and L3-L4. See narrative above for full details.

## 2025-04-28 ENCOUNTER — TELEPHONE (OUTPATIENT)
Age: 60
End: 2025-04-28

## 2025-04-28 NOTE — TELEPHONE ENCOUNTER
Left message for patient to call me back DIRECTLY to schedule.  Left my DIRECT phone # [phone] on message.          Patient procedure and OVS has been rescheduled.

## 2025-04-28 NOTE — TELEPHONE ENCOUNTER
Caller: Miguelina    Doctor: Dr. Quiroz    Reason for call: pt calling to reschedule her 05/01 procedure. Pt is sick.     Call back#: [phone]

## 2025-05-02 ENCOUNTER — RA CDI HCC (OUTPATIENT)
Dept: OTHER | Facility: HOSPITAL | Age: 60
End: 2025-05-02

## 2025-05-09 ENCOUNTER — OFFICE VISIT (OUTPATIENT)
Dept: FAMILY MEDICINE CLINIC | Facility: CLINIC | Age: 60
End: 2025-05-09
Payer: COMMERCIAL

## 2025-05-09 VITALS
HEART RATE: 75 BPM | RESPIRATION RATE: 16 BRPM | DIASTOLIC BLOOD PRESSURE: 80 MMHG | HEIGHT: 64 IN | BODY MASS INDEX: 29.71 KG/M2 | WEIGHT: 174 LBS | TEMPERATURE: 97.5 F | SYSTOLIC BLOOD PRESSURE: 118 MMHG | OXYGEN SATURATION: 95 %

## 2025-05-09 DIAGNOSIS — E66.9 OBESITY (BMI 30-39.9): ICD-10-CM

## 2025-05-09 DIAGNOSIS — R73.9 HYPERGLYCEMIA: ICD-10-CM

## 2025-05-09 DIAGNOSIS — E78.49 OTHER HYPERLIPIDEMIA: ICD-10-CM

## 2025-05-09 DIAGNOSIS — J20.8 ACUTE BRONCHITIS DUE TO OTHER SPECIFIED ORGANISMS: Primary | ICD-10-CM

## 2025-05-09 DIAGNOSIS — E55.9 VITAMIN D INSUFFICIENCY: ICD-10-CM

## 2025-05-09 PROCEDURE — G2211 COMPLEX E/M VISIT ADD ON: HCPCS | Performed by: FAMILY MEDICINE

## 2025-05-09 PROCEDURE — 99214 OFFICE O/P EST MOD 30 MIN: CPT | Performed by: FAMILY MEDICINE

## 2025-05-09 RX ORDER — PREDNISONE 50 MG/1
50 TABLET ORAL DAILY
Qty: 5 TABLET | Refills: 0 | Status: SHIPPED | OUTPATIENT
Start: 2025-05-09 | End: 2025-05-14

## 2025-05-09 RX ORDER — PHENTERMINE HYDROCHLORIDE 37.5 MG/1
37.5 TABLET ORAL EVERY MORNING
Qty: 30 TABLET | Refills: 0 | Status: SHIPPED | OUTPATIENT
Start: 2025-05-09

## 2025-05-09 RX ORDER — CEPHALEXIN 500 MG/1
500 CAPSULE ORAL 3 TIMES DAILY
Qty: 21 CAPSULE | Refills: 0 | Status: SHIPPED | OUTPATIENT
Start: 2025-05-09 | End: 2025-05-16

## 2025-05-09 RX ORDER — BENZONATATE 200 MG/1
200 CAPSULE ORAL 3 TIMES DAILY PRN
Qty: 20 CAPSULE | Refills: 0 | Status: SHIPPED | OUTPATIENT
Start: 2025-05-09

## 2025-05-09 NOTE — ASSESSMENT & PLAN NOTE
Currently well-controlled without medication.   Continue the same.   Discussed low-fat diet and regular exercise.   Will continue to monitor with fasting lipid panel.

## 2025-05-09 NOTE — ASSESSMENT & PLAN NOTE
Currently stable.   Discussed low-carb diet and regular exercise.  Will continue to monitor with fasting glucose and A1c.

## 2025-05-09 NOTE — ASSESSMENT & PLAN NOTE
Improving on phentermine.  Discussed about low-carb and regular exercise.  Continue phentermine 37.5 mg daily.  Will continue to monitor.  Orders:  •  phentermine (ADIPEX-P) 37.5 MG tablet; Take 1 tablet (37.5 mg total) by mouth every morning

## 2025-05-09 NOTE — PROGRESS NOTES
Name: Miguelina Russo      : 1965      MRN: [id number]  Encounter Provider: Mike Leblanc MD  Encounter Date: 2025   Encounter department: [facility]  :  Assessment & Plan  Acute bronchitis due to other specified organisms  Not well-controlled.  She was given prescription for Keflex and prednisone.  Was given Tessalon Perles for cough.  Was told if symptoms not improving call or come back.  Orders:  •  cephalexin (KEFLEX) 500 mg capsule; Take 1 capsule (500 mg total) by mouth 3 (three) times a day for 7 days  •  predniSONE 50 mg tablet; Take 1 tablet (50 mg total) by mouth daily for 5 days  •  benzonatate (TESSALON) 200 MG capsule; Take 1 capsule (200 mg total) by mouth 3 (three) times a day as needed for cough    Obesity (BMI 30-39.9)  Improving on phentermine.  Discussed about low-carb and regular exercise.  Continue phentermine 37.5 mg daily.  Will continue to monitor.  Orders:  •  phentermine (ADIPEX-P) 37.5 MG tablet; Take 1 tablet (37.5 mg total) by mouth every morning    Other hyperlipidemia  Currently well-controlled without medication.   Continue the same.   Discussed low-fat diet and regular exercise.   Will continue to monitor with fasting lipid panel.        Hyperglycemia  Currently stable.   Discussed low-carb diet and regular exercise.  Will continue to monitor with fasting glucose and A1c.        Vitamin D insufficiency  Stable. Will continue to monitor.              History of Present Illness   Is here today for follow-up multiple medical problems.  She has been taking phentermine to help with her weight loss and she lost 3 pounds.  Today she complained of having problems with cough and upper respiratory symptoms including nasal congestion and postnasal drip.  She denies any fever or chills.  She is due for blood work.    Cough  Pertinent negatives include no chest pain, chills, fever, headaches, rash or shortness of breath.     Review of Systems  "  Constitutional:  Negative for chills and fever.   HENT:  Negative for trouble swallowing.    Eyes:  Negative for visual disturbance.   Respiratory:  Positive for cough. Negative for shortness of breath.    Cardiovascular:  Negative for chest pain, palpitations and leg swelling.   Gastrointestinal:  Negative for abdominal pain, constipation and diarrhea.   Endocrine: Negative for cold intolerance and heat intolerance.   Genitourinary:  Negative for difficulty urinating and dysuria.   Musculoskeletal:  Negative for gait problem.   Skin:  Negative for rash.   Neurological:  Negative for dizziness, tremors, seizures and headaches.   Hematological:  Negative for adenopathy.   Psychiatric/Behavioral:  Negative for behavioral problems.        Objective   /80 (BP Location: Left arm, Patient Position: Sitting, Cuff Size: Standard)   Pulse 75   Temp 97.5 °F (36.4 °C) (Tympanic)   Resp 16   Ht 5' 4\" (1.626 m)   Wt 78.9 kg (174 lb)   SpO2 95%   BMI 29.87 kg/m²      Physical Exam  Vitals and nursing note reviewed.   Constitutional:       Appearance: She is well-developed.   HENT:      Head: Normocephalic and atraumatic.      Nose: Congestion present.      Mouth/Throat:      Pharynx: Posterior oropharyngeal erythema present.   Eyes:      Pupils: Pupils are equal, round, and reactive to light.   Cardiovascular:      Rate and Rhythm: Normal rate and regular rhythm.      Heart sounds: Normal heart sounds.   Pulmonary:      Effort: Pulmonary effort is normal.      Breath sounds: Wheezing present.   Abdominal:      General: Bowel sounds are normal.      Palpations: Abdomen is soft.   Musculoskeletal:      Cervical back: Normal range of motion and neck supple.   Lymphadenopathy:      Cervical: No cervical adenopathy.   Skin:     General: Skin is warm.   Neurological:      Mental Status: She is alert and oriented to person, place, and time.         "

## 2025-05-09 NOTE — ASSESSMENT & PLAN NOTE
Not well-controlled.  She was given prescription for Keflex and prednisone.  Was given Tessalon Perles for cough.  Was told if symptoms not improving call or come back.  Orders:  •  cephalexin (KEFLEX) 500 mg capsule; Take 1 capsule (500 mg total) by mouth 3 (three) times a day for 7 days  •  predniSONE 50 mg tablet; Take 1 tablet (50 mg total) by mouth daily for 5 days  •  benzonatate (TESSALON) 200 MG capsule; Take 1 capsule (200 mg total) by mouth 3 (three) times a day as needed for cough

## 2025-05-12 ENCOUNTER — TELEPHONE (OUTPATIENT)
Dept: FAMILY MEDICINE CLINIC | Facility: CLINIC | Age: 60
End: 2025-05-12

## 2025-05-12 NOTE — TELEPHONE ENCOUNTER
PA for phentermine 37.5MG tablet EXCLUDED from plan       Reason:(Screenshot if applicable)      Message sent to office clinical pool Yes

## 2025-05-12 NOTE — TELEPHONE ENCOUNTER
I sent message to pt through her my chart about denial. I also advised her to go on [employer] and print discount card to pay less then 20.00. she is to call with any questions

## 2025-05-14 ENCOUNTER — TELEPHONE (OUTPATIENT)
Dept: PSYCHIATRY | Facility: CLINIC | Age: 60
End: 2025-05-14

## 2025-05-14 NOTE — TELEPHONE ENCOUNTER
Patient contacted the office to schedule a follow up visit with provider. Patient is now scheduled for 6/13/2025  at 11:30 am virtually.

## 2025-05-14 NOTE — TELEPHONE ENCOUNTER
Called and left a message notifying the patient that the appt scheduled for 5/16 was cancelled due to the provider is  out of the office. .  A call back was requested to reschedule.

## 2025-06-08 PROBLEM — J20.8 ACUTE BRONCHITIS DUE TO OTHER SPECIFIED ORGANISMS: Status: RESOLVED | Noted: 2025-05-09 | Resolved: 2025-06-08

## 2025-06-13 ENCOUNTER — TELEPHONE (OUTPATIENT)
Dept: PSYCHIATRY | Facility: CLINIC | Age: 60
End: 2025-06-13

## 2025-06-13 ENCOUNTER — TELEMEDICINE (OUTPATIENT)
Dept: PSYCHIATRY | Facility: CLINIC | Age: 60
End: 2025-06-13
Payer: COMMERCIAL

## 2025-06-13 ENCOUNTER — APPOINTMENT (OUTPATIENT)
Dept: LAB | Age: 60
End: 2025-06-13
Payer: COMMERCIAL

## 2025-06-13 DIAGNOSIS — F51.01 PRIMARY INSOMNIA: ICD-10-CM

## 2025-06-13 DIAGNOSIS — M79.7 FIBROMYALGIA: ICD-10-CM

## 2025-06-13 DIAGNOSIS — R73.9 HYPERGLYCEMIA: ICD-10-CM

## 2025-06-13 DIAGNOSIS — E78.49 OTHER HYPERLIPIDEMIA: ICD-10-CM

## 2025-06-13 DIAGNOSIS — F41.9 ANXIETY: ICD-10-CM

## 2025-06-13 DIAGNOSIS — M54.16 LUMBAR RADICULOPATHY: ICD-10-CM

## 2025-06-13 DIAGNOSIS — Z98.1 S/P LUMBAR SPINAL FUSION: ICD-10-CM

## 2025-06-13 DIAGNOSIS — F31.32 BIPOLAR DISORDER WITH MODERATE DEPRESSION (HCC): Primary | ICD-10-CM

## 2025-06-13 DIAGNOSIS — K83.5: ICD-10-CM

## 2025-06-13 DIAGNOSIS — F51.04 PSYCHOPHYSIOLOGICAL INSOMNIA: ICD-10-CM

## 2025-06-13 LAB
ALBUMIN SERPL BCG-MCNC: 4.1 G/DL (ref 3.5–5)
ALP SERPL-CCNC: 96 U/L (ref 34–104)
ALT SERPL W P-5'-P-CCNC: 17 U/L (ref 7–52)
ANION GAP SERPL CALCULATED.3IONS-SCNC: 10 MMOL/L (ref 4–13)
AST SERPL W P-5'-P-CCNC: 16 U/L (ref 13–39)
BASOPHILS # BLD AUTO: 0.06 THOUSANDS/ÂΜL (ref 0–0.1)
BASOPHILS NFR BLD AUTO: 1 % (ref 0–1)
BILIRUB SERPL-MCNC: 0.38 MG/DL (ref 0.2–1)
BUN SERPL-MCNC: 15 MG/DL (ref 5–25)
CALCIUM SERPL-MCNC: 9.1 MG/DL (ref 8.4–10.2)
CHLORIDE SERPL-SCNC: 103 MMOL/L (ref 96–108)
CHOLEST SERPL-MCNC: 170 MG/DL (ref ?–200)
CO2 SERPL-SCNC: 26 MMOL/L (ref 21–32)
CREAT SERPL-MCNC: 1.09 MG/DL (ref 0.6–1.3)
EOSINOPHIL # BLD AUTO: 0.21 THOUSAND/ÂΜL (ref 0–0.61)
EOSINOPHIL NFR BLD AUTO: 3 % (ref 0–6)
ERYTHROCYTE [DISTWIDTH] IN BLOOD BY AUTOMATED COUNT: 14.5 % (ref 11.6–15.1)
EST. AVERAGE GLUCOSE BLD GHB EST-MCNC: 114 MG/DL
GFR SERPL CREATININE-BSD FRML MDRD: 54 ML/MIN/1.73SQ M
GLUCOSE P FAST SERPL-MCNC: 105 MG/DL (ref 65–99)
HBA1C MFR BLD: 5.6 %
HCT VFR BLD AUTO: 45.6 % (ref 34.8–46.1)
HDLC SERPL-MCNC: 93 MG/DL
HGB BLD-MCNC: 14.5 G/DL (ref 11.5–15.4)
IMM GRANULOCYTES # BLD AUTO: 0.02 THOUSAND/UL (ref 0–0.2)
IMM GRANULOCYTES NFR BLD AUTO: 0 % (ref 0–2)
LDLC SERPL CALC-MCNC: 53 MG/DL (ref 0–100)
LYMPHOCYTES # BLD AUTO: 2.18 THOUSANDS/ÂΜL (ref 0.6–4.47)
LYMPHOCYTES NFR BLD AUTO: 29 % (ref 14–44)
MCH RBC QN AUTO: 30.1 PG (ref 26.8–34.3)
MCHC RBC AUTO-ENTMCNC: 31.8 G/DL (ref 31.4–37.4)
MCV RBC AUTO: 95 FL (ref 82–98)
MONOCYTES # BLD AUTO: 0.53 THOUSAND/ÂΜL (ref 0.17–1.22)
MONOCYTES NFR BLD AUTO: 7 % (ref 4–12)
NEUTROPHILS # BLD AUTO: 4.54 THOUSANDS/ÂΜL (ref 1.85–7.62)
NEUTS SEG NFR BLD AUTO: 60 % (ref 43–75)
NRBC BLD AUTO-RTO: 0 /100 WBCS
PLATELET # BLD AUTO: 213 THOUSANDS/UL (ref 149–390)
PMV BLD AUTO: 10.9 FL (ref 8.9–12.7)
POTASSIUM SERPL-SCNC: 4.1 MMOL/L (ref 3.5–5.3)
PROT SERPL-MCNC: 6.2 G/DL (ref 6.4–8.4)
RBC # BLD AUTO: 4.81 MILLION/UL (ref 3.81–5.12)
SODIUM SERPL-SCNC: 139 MMOL/L (ref 135–147)
TRIGL SERPL-MCNC: 120 MG/DL (ref ?–150)
TSH SERPL DL<=0.05 MIU/L-ACNC: 2.7 UIU/ML (ref 0.45–4.5)
WBC # BLD AUTO: 7.54 THOUSAND/UL (ref 4.31–10.16)

## 2025-06-13 PROCEDURE — 80053 COMPREHEN METABOLIC PANEL: CPT

## 2025-06-13 PROCEDURE — 83036 HEMOGLOBIN GLYCOSYLATED A1C: CPT

## 2025-06-13 PROCEDURE — 80061 LIPID PANEL: CPT

## 2025-06-13 PROCEDURE — 85025 COMPLETE CBC W/AUTO DIFF WBC: CPT

## 2025-06-13 PROCEDURE — 98006 SYNCH AUDIO-VIDEO EST MOD 30: CPT | Performed by: PHYSICIAN ASSISTANT

## 2025-06-13 PROCEDURE — 36415 COLL VENOUS BLD VENIPUNCTURE: CPT

## 2025-06-13 PROCEDURE — 84443 ASSAY THYROID STIM HORMONE: CPT

## 2025-06-13 RX ORDER — HYDROXYZINE HYDROCHLORIDE 50 MG/1
TABLET, FILM COATED ORAL
Qty: 180 TABLET | Refills: 1 | Status: SHIPPED | OUTPATIENT
Start: 2025-06-13

## 2025-06-13 RX ORDER — GABAPENTIN 400 MG/1
800 CAPSULE ORAL 3 TIMES DAILY
Qty: 540 CAPSULE | Refills: 1 | Status: SHIPPED | OUTPATIENT
Start: 2025-06-13

## 2025-06-13 RX ORDER — TRAZODONE HYDROCHLORIDE 50 MG/1
TABLET ORAL
Qty: 270 TABLET | Refills: 0 | Status: SHIPPED | OUTPATIENT
Start: 2025-06-13

## 2025-06-13 RX ORDER — BUPROPION HYDROCHLORIDE 300 MG/1
300 TABLET ORAL EVERY MORNING
Qty: 90 TABLET | Refills: 1 | Status: SHIPPED | OUTPATIENT
Start: 2025-06-13

## 2025-06-13 RX ORDER — LAMOTRIGINE 25 MG/1
TABLET ORAL
Qty: 900 TABLET | Refills: 1 | Status: SHIPPED | OUTPATIENT
Start: 2025-06-13

## 2025-06-13 RX ORDER — ARIPIPRAZOLE 10 MG/1
10 TABLET ORAL
Qty: 90 TABLET | Refills: 1 | Status: SHIPPED | OUTPATIENT
Start: 2025-06-13

## 2025-06-13 NOTE — BH TREATMENT PLAN
"TREATMENT PLAN (Medication Management Only)        [facility]    Name and Date of Birth:  Miguelina Russo 60 y.o. 1965  MRN: [id number]  Date of Treatment Plan: June 13, 2025  Diagnosis/Diagnoses:    1. Bipolar disorder with moderate depression (HCC)    2. Anxiety    3. Psychophysiological insomnia    4. Fibromyalgia    5. Bipolar 1 disorder, mixed, moderate (HCC)    6. Medication monitoring encounter    7. Primary insomnia      Strengths/Personal Resources for Self-Care: \"spouse\".  Area/Areas of need (in own words): \"get better physically\"  1. Long Term Goal:   maintain acceptable anxiety level.  Target Date:6 months - 12/13/2025  Person/Persons responsible for completion of goal: Miguelina  2.  Short Term Objective (s) - How will we reach this goal?:   A.  Provider new recommended medication/dosage changes and/or continue medication(s): continue current medications as prescribed.  B.  N/A.  C.  N/A.  Target Date:6 months - 12/13/2025  Person/Persons Responsible for Completion of Goal: Miguelina  Progress Towards Goals: stable  Treatment Modality: medication management every 3 months  Review due 180 days from date of this plan: 6 months - 12/13/2025  Expected length of service: maintenance unless revised  My Physician/PA/NP and I have developed this plan together and I agree to work on the goals and objectives. I understand the treatment goals that were developed for my treatment.   Electronic Signatures: on file (unless signed below)    Clementine Campuzano PA-C 06/13/25  " [FreeTextEntry1] : X-ray question of a 2.3 cm medial right upper lobe nodule opacity the remainder lungs were clear 8/5/2024

## 2025-06-13 NOTE — PSYCH
Virtual Regular Visit    Patient is located at Home in the following state in which I hold an active license [place].    Assessment & Plan  Bipolar disorder with moderate depression (HCC)  Moods are currently stable with medications and treatment plan  Wellbutrin  mg every morning  Lamictal 200 mg at night and 50 mg in the morning  Abilify 10 mg at night  Orders:    buPROPion (Wellbutrin XL) 300 mg 24 hr tablet; Take 1 tablet (300 mg total) by mouth every morning    ARIPiprazole (ABILIFY) 10 mg tablet; Take 1 tablet (10 mg total) by mouth daily at bedtime    lamoTRIgine (LaMICtal) 25 mg tablet; TAKE 2 PO QAM AND TAKE EIGHT TABLETS BY MOUTH AT BEDTIME    Anxiety    Orders:    hydrOXYzine HCL (ATARAX) 50 mg tablet; TAKE ONE-HALF TO ONE TABLET BY MOUTH THREE TIMES A DAY AS NEEDED FOR ANXIETY    Psychophysiological insomnia    Orders:    traZODone (DESYREL) 50 mg tablet; TAKE THREE TABLETS BY MOUTH DAILY AT BEDTIME AS NEEDED    Fibromyalgia  Manageable with medications  Continue gabapentin 800 mg 3 times daily    Orders:    gabapentin (NEURONTIN) 400 mg capsule; Take 2 capsules (800 mg total) by mouth 3 (three) times a day     Follow-up in 3 months and she will call me sooner if any questions or concerns         Reason for visit is   Chief Complaint   Patient presents with    Follow-up    Medication Management        Visit Time  Visit Start Time: 1130  Visit Stop Time: 1150  Total Visit Duration: 20 minutes    Encounter provider: Clementine Campuzano PA-C  Provider located at [facility] [address] [address] [phone]    Recent Visits  No visits were found meeting these conditions.  Showing recent visits within past 7 days and meeting all other requirements  Today's Visits  Date Type Provider Dept   06/13/25 Telephone Clementine Campuzano PA-C [facility]   06/13/25 Telemedicine Clementine Campuzano PA-C [facility]   Showing today's visits and meeting all other requirements  Future Appointments  No visits were found meeting these conditions.  Showing future appointments within next 150 days and meeting all other requirements     Administrative Statements   Encounter provider Clementine Campzuano PA-C    The Patient is located at Home and in the following state in which [place] an [place].    The patient was identified by name and date of birth. Miguelina Russo was informed that this is a telemedicine visit and that the visit is being conducted through the Epic Embedded platform. She agrees to proceed..  My office door was closed. No one else was in the room.  She acknowledged consent and understanding of privacy and security of the video platform. The patient has agreed to participate and understands they can discontinue the visit at any time.    I have spent a total time of 20 minutes in caring for this patient on the day of the visit/encounter including Risks and benefits of tx options, Instructions for management, Patient and family education, Importance of tx compliance, Risk factor reductions, Impressions, Counseling / Coordination of care, Documenting in the medical record, Reviewing/placing orders in the medical record (including tests, medications, and/or procedures), and Obtaining or reviewing history  , not including the time spent for establishing the audio/video connection.   MEDICATION MANAGEMENT NOTE        [facility]      Name and Date of Birth:  Miguelina Russo 60 y.o. 1965 MRN: [id number]    Date of Visit: June 13, 2025       Subjective        Miguelina Russo is a 60 y.o. female, visited for Follow-up and Medication Management, who was virtually seen and evaluated today at the [facility] outpatient clinic for follow-up and medication management. Completes psychiatric assessment without  difficulty.     At previous outpatient psychiatric appointment with this writer, the intensity medications.   She denies any current adverse medication side effects.      Miguelina states that she has been doing okay with her moods over the past few months.  After her last visit with me she was seen due to a fall down a step.  Unfortunately she had a bilateral fracture in her ankles.  States that she was in a boot for a few weeks but once I did not heal properly.  She is going to see podiatric Surgeon at the end of this month.  States that her spouse works and her daughter that resides with them has not been helpful with doing things around the house.  Due to this she had to keep active and was walking a lot on her ankle which likely inhibited proper healing.  States that her pain has improved though.  She is sleeping adequately at night.  Appetite has been adequate.  She has been successful in losing weight since being on phentermine.  Denies any adverse effects with this.  Feels as though her moods have been stable and she is in good spirits.    No significant depressive episodes and no panic attacks.  Taking medications as prescribed and would like to continue same treatment plan.          Review Of Systems:  Pertinent items are noted in HPI; all others are negative; no recent changes in medications or health status reported.    PHQ-2/9 Depression Screening                 Historical Information        Past Medical History:    Past Medical History[1]     Past Surgical History[2]  Allergies[3]    Substance Abuse History:    Social History     Substance and Sexual Activity   Alcohol Use Not Currently    Comment: social      Social History     Substance and Sexual Activity   Drug Use No       Social History:    Social History     Socioeconomic History    Marital status: /Civil Union     Spouse name: Not on file    Number of children: Not on file    Years of education: Not on file    Highest education level: Not on  file   Occupational History    Not on file   Tobacco Use    Smoking status: Every Day     Current packs/day: 1.00     Average packs/day: 1 pack/day for 44.4 years (44.4 ttl pk-yrs)     Types: Cigarettes     Start date: 1981     Passive exposure: Never    Smokeless tobacco: Never   Vaping Use    Vaping status: Never Used   Substance and Sexual Activity    Alcohol use: Not Currently     Comment: social     Drug use: No    Sexual activity: Not Currently   Other Topics Concern    Not on file   Social History Narrative    Not on file     Social Drivers of Health     Financial Resource Strain: Not on file   Food Insecurity: Not on file   Transportation Needs: Not on file   Physical Activity: Not on file   Stress: Not on file   Social Connections: Not on file   Intimate Partner Violence: Not on file   Housing Stability: Not on file       Family Psychiatric History:     Family History[4]    History Review: The following portions of the patient's history were reviewed and updated as appropriate: allergies, current medications, past family history, past medical history, past social history, past surgical history and problem list.           Objective      Vital signs in last 24 hours:  There were no vitals filed for this visit.    Mental Status Evaluation:    Appearance age appropriate, casually dressed   Behavior cooperative, calm   Speech normal rate, normal volume, normal pitch   Mood euthymic   Affect normal range and intensity, appropriate   Thought Processes organized, goal directed   Associations intact associations   Thought Content no overt delusions   Perceptual Disturbances: no auditory hallucinations, no visual hallucinations   Abnormal Thoughts  Risk Potential Suicidal ideation - None  Homicidal ideation - None  Potential for aggression - No   Orientation oriented to: person, place, time/date, and situation   Memory recent and remote memory grossly intact   Consciousness alert and awake   Attention Span  Concentration Span attention span and concentration are age appropriate   Intellect appears to be of average intelligence   Insight intact   Judgement intact   Muscle Strength and  Gait unable to assess today due to virtual visit   Motor activity unable to assess today due to virtual visit   Language no difficulty naming common objects, no difficulty repeating a phrase   Fund of Knowledge adequate knowledge of current events  adequate fund of knowledge regarding past history  adequate fund of knowledge regarding vocabulary    Pain none   Pain Scale 0     Laboratory Results: I have personally reviewed all pertinent laboratory/tests results  Recent Labs (last 2 months):   No visits with results within 2 Month(s) from this visit.   Latest known visit with results is:   Appointment on 12/13/2024   Component Date Value    WBC 12/13/2024 6.06     RBC 12/13/2024 4.94     Hemoglobin 12/13/2024 15.5 (H)     Hematocrit 12/13/2024 45.8     MCV 12/13/2024 93     MCH 12/13/2024 31.4     MCHC 12/13/2024 33.8     RDW 12/13/2024 13.4     MPV 12/13/2024 11.1     Platelets 12/13/2024 197     nRBC 12/13/2024 0     Segmented % 12/13/2024 58     Immature Grans % 12/13/2024 0     Lymphocytes % 12/13/2024 32     Monocytes % 12/13/2024 6     Eosinophils Relative 12/13/2024 3     Basophils Relative 12/13/2024 1     Absolute Neutrophils 12/13/2024 3.46     Absolute Immature Grans 12/13/2024 0.01     Absolute Lymphocytes 12/13/2024 1.92     Absolute Monocytes 12/13/2024 0.39     Eosinophils Absolute 12/13/2024 0.20     Basophils Absolute 12/13/2024 0.08     Sodium 12/13/2024 142     Potassium 12/13/2024 3.8     Chloride 12/13/2024 104     CO2 12/13/2024 30     ANION GAP 12/13/2024 8     BUN 12/13/2024 9     Creatinine 12/13/2024 1.05     Glucose, Fasting 12/13/2024 94     Calcium 12/13/2024 9.6     AST 12/13/2024 13     ALT 12/13/2024 11     Alkaline Phosphatase 12/13/2024 75     Total Protein 12/13/2024 6.7     Albumin 12/13/2024 4.5      Total Bilirubin 12/13/2024 0.56     eGFR 12/13/2024 57     Hemoglobin A1C 12/13/2024 5.6     EAG 12/13/2024 114     Cholesterol 12/13/2024 165     Triglycerides 12/13/2024 115     HDL, Direct 12/13/2024 63     LDL Calculated 12/13/2024 79     TSH 3RD GENERATION 12/13/2024 3.542                Current Outpatient Medications   Medication Sig Dispense Refill    ARIPiprazole (ABILIFY) 10 mg tablet Take 1 tablet (10 mg total) by mouth daily at bedtime 90 tablet 1    buPROPion (Wellbutrin XL) 300 mg 24 hr tablet Take 1 tablet (300 mg total) by mouth every morning 90 tablet 1    gabapentin (NEURONTIN) 400 mg capsule Take 2 capsules (800 mg total) by mouth 3 (three) times a day 540 capsule 1    hydrOXYzine HCL (ATARAX) 50 mg tablet TAKE ONE-HALF TO ONE TABLET BY MOUTH THREE TIMES A DAY AS NEEDED FOR ANXIETY 180 tablet 1    lamoTRIgine (LaMICtal) 25 mg tablet TAKE 2 PO QAM AND TAKE EIGHT TABLETS BY MOUTH AT BEDTIME 900 tablet 1    traZODone (DESYREL) 50 mg tablet TAKE THREE TABLETS BY MOUTH DAILY AT BEDTIME AS NEEDED 270 tablet 0    albuterol (PROVENTIL HFA,VENTOLIN HFA) 90 mcg/act inhaler Inhale 2 puffs every 4 (four) hours as needed for wheezing 6.7 g 0    benzonatate (TESSALON) 200 MG capsule Take 1 capsule (200 mg total) by mouth 3 (three) times a day as needed for cough 20 capsule 0    Calcium Carbonate (CALTRATE 600 PO) Take by mouth in the morning      methocarbamol (ROBAXIN) 500 mg tablet Take 1 tablet (500 mg total) by mouth daily at bedtime (Patient not taking: Reported on 5/9/2025) 30 tablet 0    omeprazole (PriLOSEC) 40 MG capsule Take 1 capsule (40 mg total) by mouth daily before breakfast 90 capsule 1    phentermine (ADIPEX-P) 37.5 MG tablet Take 1 tablet (37.5 mg total) by mouth every morning 30 tablet 0     No current facility-administered medications for this visit.         Medications Risks/Benefits      Risks, Benefits And Possible Side Effects Of Medications:    Risks, benefits, and possible side effects  of medications explained to Miguelina and she verbalizes understanding and agreement for treatment.    Controlled Medication Discussion:     Not applicable    Psychotherapy Provided:     Individual psychotherapy provided: No       Treatment Plan:    Completed and signed during the session: Yes - with Miguelina    Note Share    This note was not shared with the patient due to reasonable likelihood of causing patient harm    Clementine Campuzano PA-C 06/13/25    This note was completed in part utilizing Dragon dictation Software. Grammatical, translation, syntax errors, random word insertions, spelling mistakes, and incomplete sentences may be an occasional consequence of this system secondary to software limitations with voice recognition, ambient noise, and hardware issues. If you have any questions or concerns about the content, text, or information contained within the body of this dictation, please contact the provider for clarification.          [1]   Past Medical History:  Diagnosis Date    Anxiety     Colon polyp     Depression     GERD (gastroesophageal reflux disease)     Ovarian cyst     Urinary incontinence    [2]   Past Surgical History:  Procedure Laterality Date    BACK SURGERY      fusion    CERVICAL SPINE SURGERY      disc replaced    COLONOSCOPY      CYSTOSCOPY  03/13/2018         OOPHORECTOMY Right     DC LAPAROSCOPY SLING OPERATION STRESS INCONT N/A 04/24/2018    Procedure: INSERTION PUBOVAGINAL SLING (FEMALE) Trans obturator mid urethral sling insertion, CYSTOSCOPY;  Surgeon: James Mesa MD;  [facility];  Service: Urology    DC LAPAROSCOPY SURG CHOLECYSTECTOMY N/A 8/14/2024    Procedure: ADE LAPAROSCOPIC W/ ROBOTICS;  Surgeon: Mariya Cisneros MD;  [facility];  Service: General   [3]   Allergies  Allergen Reactions    Benadryl [Diphenhydramine] Hyperactivity    Tylenol With Codeine #3 [Acetaminophen-Codeine] Hyperactivity    Azithromycin Rash    Erythromycin Rash   [4]    Family History  Problem Relation Name Age of Onset    No Known Problems Mother      No Known Problems Father      No Known Problems Sister      No Known Problems Sister      No Known Problems Daughter      No Known Problems Daughter      No Known Problems Daughter      No Known Problems Maternal Grandmother      No Known Problems Maternal Grandfather      No Known Problems Paternal Grandmother      No Known Problems Paternal Grandfather      No Known Problems Paternal Aunt      Breast cancer Neg Hx

## 2025-06-13 NOTE — ASSESSMENT & PLAN NOTE
Manageable with medications  Continue gabapentin 800 mg 3 times daily    Orders:    gabapentin (NEURONTIN) 400 mg capsule; Take 2 capsules (800 mg total) by mouth 3 (three) times a day

## 2025-06-16 ENCOUNTER — TELEPHONE (OUTPATIENT)
Age: 60
End: 2025-06-16

## 2025-06-16 NOTE — TELEPHONE ENCOUNTER
Patient calling to ask if Primary Care Provider reviewed her lab work from 6/13/25. Patient is scheduled for office visit along with her , Thierry, on 7/11/25. Informed patient that Primary Care Provider will have to review her labs. Please contact patient once reviewed at [phone]. Thank you!

## 2025-06-17 DIAGNOSIS — E66.9 OBESITY (BMI 30-39.9): ICD-10-CM

## 2025-06-17 RX ORDER — PHENTERMINE HYDROCHLORIDE 37.5 MG/1
37.5 TABLET ORAL DAILY
Qty: 30 TABLET | Refills: 0 | Status: SHIPPED | OUTPATIENT
Start: 2025-06-17

## 2025-06-17 NOTE — TELEPHONE ENCOUNTER
Her blood work came back showing slightly elevated fasting glucose but her A1c is normal.  Also her GFR is a slightly decreased.  I encourage increase oral hydration.  All the rest of blood work came back stable.

## 2025-06-17 NOTE — TELEPHONE ENCOUNTER
Pharmacy requesting refill on Phentermine   Last seen 5/9/25  Has appt 7/11/25     1 MIGUELINA BATEMAN 1965 F [address] [facility] [place] [address] [place]      Search Criteria  Name Date of Birth Date Range  Miguelina Bateman 1965 06- To 06-  Requester Name Requested Date  [facility]  Summary  Prescriptions  5  Prescribers  3  Pharmacies  1  Drug Classes  Benzodiazepines  0  Stimulants  3  Opioids  2  Muscle Relaxants  0  Opioid Dosage  Total MME for Active Prescriptions  0    Average MME  39.00         Prescriptions  Notifications    Prescribers  Pharmacies  MME Graph    Show All     [place]   Drug Categories:      Stimulants       Opioids     Show  10  entries  Search:  Patient Id Prescription # Sold Filled Written Drug Label Qty Days Strength MME* Prescriber Pharmacy Payment REFILL #/Auth State Detail  1 [id number] 05/09/2025 05/09/2025 05/09/2025 Phentermine Hcl (Tablet) 30.0 30 37.5 MG KIM LINDSAY) [id number] Commercial [place] 0 / 0 [place]   1 [id number] 04/06/2025 04/02/2025 04/02/2025 Phentermine Hcl (Tablet) 30.0 30 37.5 MG KIM LINDSAY) [id number] Commercial [place] 0 / 0 [place]   1 [id number] 03/02/2025 02/28/2025 02/28/2025 Phentermine Hcl (Tablet) 30.0 30 37.5 MG KIM LINDSAY) [id number] Commercial [place] 0 / 0 [place]   1 [id number] 10/24/2024 10/24/2024 10/24/2024 traMADol HCL (Tablet) 12.0 3 50 MG 40.0 KIERSTEN [id number] Commercial [place] 0 / 0 [place]   1 [id number] 08/18/2024 08/14/2024 08/14/2024 oxyCODONE HCL (Tablet) 10.0 2 5 MG 37.50 TITA MINA

## 2025-06-19 ENCOUNTER — TELEPHONE (OUTPATIENT)
Age: 60
End: 2025-06-19

## 2025-06-19 NOTE — TELEPHONE ENCOUNTER
Patient contacted the office to schedule a follow up visit with provider. Patient is now scheduled for Friday, 9/19/2025  at 10:30AM virtually with Dr. Campuzano.    Writer confirmed completed [facility] Care Consent form is on file.

## 2025-06-23 ENCOUNTER — HOSPITAL ENCOUNTER (OUTPATIENT)
Dept: RADIOLOGY | Facility: MEDICAL CENTER | Age: 60
Discharge: HOME | End: 2025-06-23
Attending: ANESTHESIOLOGY
Payer: COMMERCIAL

## 2025-06-23 VITALS
TEMPERATURE: 97.5 F | RESPIRATION RATE: 16 BRPM | OXYGEN SATURATION: 95 % | SYSTOLIC BLOOD PRESSURE: 106 MMHG | DIASTOLIC BLOOD PRESSURE: 74 MMHG | HEART RATE: 73 BPM

## 2025-06-23 DIAGNOSIS — M54.16 LUMBAR RADICULOPATHY: ICD-10-CM

## 2025-06-23 PROCEDURE — 64483 NJX AA&/STRD TFRM EPI L/S 1: CPT | Performed by: ANESTHESIOLOGY

## 2025-06-23 RX ORDER — PAPAVERINE HCL 150 MG
15 CAPSULE, EXTENDED RELEASE ORAL ONCE
Status: COMPLETED | OUTPATIENT
Start: 2025-06-23 | End: 2025-06-23

## 2025-06-23 RX ORDER — BUPIVACAINE HCL/PF 2.5 MG/ML
2.5 VIAL (ML) INJECTION ONCE
Status: COMPLETED | OUTPATIENT
Start: 2025-06-23 | End: 2025-06-23

## 2025-06-23 RX ADMIN — BUPIVACAINE HYDROCHLORIDE 2.5 ML: 2.5 INJECTION, SOLUTION EPIDURAL; INFILTRATION; INTRACAUDAL at 15:01

## 2025-06-23 RX ADMIN — IOHEXOL 2 ML: 300 INJECTION, SOLUTION INTRAVENOUS at 15:01

## 2025-06-23 RX ADMIN — DEXAMETHASONE SODIUM PHOSPHATE 15 MG: 10 INJECTION INTRAMUSCULAR; INTRAVENOUS at 15:01

## 2025-06-23 NOTE — DISCHARGE INSTR - LAB
Epidural Steroid Injection   WHAT YOU NEED TO KNOW:   An epidural steroid injection (DANIELLE) is a procedure to inject steroid medicine into the epidural space. The epidural space is between your spinal cord and vertebrae. Steroids reduce inflammation and fluid buildup in your spine that may be causing pain. You may be given pain medicine along with the steroids.          ACTIVITY  Do not drive or operate machinery today.  No strenuous activity today - bending, lifting, etc.  You may resume normal activites starting tomorrow - start slowly and as tolerated.  You may shower today, but no tub baths or hot tubs.  You may have numbness for several hours from the local anesthetic. Please use caution and common sense, especially with weight-bearing activities.    CARE OF THE INJECTION SITE  If you have soreness or pain, apply ice to the area today (20 minutes on/20 minutes off).  Starting tomorrow, you may use warm, moist heat or ice if needed.  You may have an increase or change in your discomfort for 36-48 hours after your treatment.  Apply ice and continue with any pain medication you have been prescribed.  Notify the [facility] if you have any of the following: redness, drainage, swelling, headache, stiff neck or fever above 100°F.    SPECIAL INSTRUCTIONS  Our office will contact you in approximately 14 days for a progress report.    MEDICATIONS  Continue to take all routine medications.  Our office may have instructed you to hold some medications.    As no general anesthesia was used in today's procedure, you should not experience any side effects related to anesthesia.     If you are diabetic, the steroids used in today's injection may temporarily increase your blood sugar levels after the first few days after your injection. Please keep a close eye on your sugars and alert the doctor who manages your diabetes if your sugars are significantly high from your baseline or you are symptomatic.     If you have a  problem specifically related to your procedure, please call our office at [phone].  Problems not related to your procedure should be directed to your primary care physician.

## 2025-06-23 NOTE — H&P
History of Present Illness: The patient is a 60 y.o. female who presents with complaints of back and leg pain    Past Medical History[1]    Past Surgical History[2]    Current Medications[3]    Allergies[4]    Physical Exam: There were no vitals filed for this visit.  General: Awake, Alert, Oriented x 3, Mood and affect appropriate  Respiratory: Respirations even and unlabored  Cardiovascular: Peripheral pulses intact; no edema  Musculoskeletal Exam: back and leg pain    ASA Score: 2    Patient/Chart Verification  Patient ID Verified: Verbal  Consents Confirmed: To be obtained in the Procedural area  H&P( within 30 days) Verified: To be obtained in the Procedural area  Allergies Reviewed: Yes  Anticoag/NSAID held?: NA  Currently on antibiotics?: No  Pregnancy denied?: NA    Assessment:   1. Lumbar radiculopathy        Plan: bilateral L3 TF DANIELLE         [1]   Past Medical History:  Diagnosis Date    Anxiety     Colon polyp     Depression     GERD (gastroesophageal reflux disease)     Ovarian cyst     Urinary incontinence    [2]   Past Surgical History:  Procedure Laterality Date    BACK SURGERY      fusion    CERVICAL SPINE SURGERY      disc replaced    COLONOSCOPY      CYSTOSCOPY  03/13/2018         OOPHORECTOMY Right     CA LAPAROSCOPY SLING OPERATION STRESS INCONT N/A 04/24/2018    Procedure: INSERTION PUBOVAGINAL SLING (FEMALE) Trans obturator mid urethral sling insertion, CYSTOSCOPY;  Surgeon: James Mesa MD;  [facility];  Service: Urology    CA LAPAROSCOPY SURG CHOLECYSTECTOMY N/A 8/14/2024    Procedure: ADE LAPAROSCOPIC W/ ROBOTICS;  Surgeon: Mariya Cisneros MD;  [facility];  Service: General   [3]   Current Outpatient Medications:     albuterol (PROVENTIL HFA,VENTOLIN HFA) 90 mcg/act inhaler, Inhale 2 puffs every 4 (four) hours as needed for wheezing, Disp: 6.7 g, Rfl: 0    ARIPiprazole (ABILIFY) 10 mg tablet, Take 1 tablet (10 mg total) by mouth daily at bedtime, Disp: 90  tablet, Rfl: 1    buPROPion (Wellbutrin XL) 300 mg 24 hr tablet, Take 1 tablet (300 mg total) by mouth every morning, Disp: 90 tablet, Rfl: 1    Calcium Carbonate (CALTRATE 600 PO), Take by mouth in the morning, Disp: , Rfl:     gabapentin (NEURONTIN) 400 mg capsule, Take 2 capsules (800 mg total) by mouth 3 (three) times a day, Disp: 540 capsule, Rfl: 1    hydrOXYzine HCL (ATARAX) 50 mg tablet, TAKE ONE-HALF TO ONE TABLET BY MOUTH THREE TIMES A DAY AS NEEDED FOR ANXIETY, Disp: 180 tablet, Rfl: 1    lamoTRIgine (LaMICtal) 25 mg tablet, TAKE 2 PO QAM AND TAKE EIGHT TABLETS BY MOUTH AT BEDTIME, Disp: 900 tablet, Rfl: 1    omeprazole (PriLOSEC) 40 MG capsule, Take 1 capsule (40 mg total) by mouth daily before breakfast, Disp: 90 capsule, Rfl: 1    phentermine (ADIPEX-P) 37.5 MG tablet, TAKE ONE TABLET BY MOUTH EVERY MORNING, Disp: 30 tablet, Rfl: 0    traZODone (DESYREL) 50 mg tablet, TAKE THREE TABLETS BY MOUTH DAILY AT BEDTIME AS NEEDED, Disp: 270 tablet, Rfl: 0    Current Facility-Administered Medications:     bupivacaine (PF) (MARCAINE) 0.25 % injection 2.5 mL, 2.5 mL, Epidural, Once, Will Gene Quiroz MD    dexamethasone (PF) (DECADRON) injection 15 mg, 15 mg, Epidural, Once, Will Gene Quiroz MD    iohexol (OMNIPAQUE) 300 mg/mL injection 2 mL, 2 mL, Epidural, Once, Will Gene Quiroz MD  [4]   Allergies  Allergen Reactions    Benadryl [Diphenhydramine] Hyperactivity    Tylenol With Codeine #3 [Acetaminophen-Codeine] Hyperactivity    Azithromycin Rash    Erythromycin Rash

## 2025-06-27 ENCOUNTER — OFFICE VISIT (OUTPATIENT)
Dept: PODIATRY | Facility: CLINIC | Age: 60
End: 2025-06-27
Payer: COMMERCIAL

## 2025-06-27 ENCOUNTER — TELEPHONE (OUTPATIENT)
Age: 60
End: 2025-06-27

## 2025-06-27 VITALS — BODY MASS INDEX: 29.71 KG/M2 | HEIGHT: 64 IN | WEIGHT: 174 LBS

## 2025-06-27 DIAGNOSIS — S82.831A CLOSED FRACTURE OF DISTAL END OF RIGHT FIBULA, UNSPECIFIED FRACTURE MORPHOLOGY, INITIAL ENCOUNTER: ICD-10-CM

## 2025-06-27 DIAGNOSIS — S82.891G ANKLE FRACTURE, RIGHT, CLOSED, WITH DELAYED HEALING, SUBSEQUENT ENCOUNTER: Primary | ICD-10-CM

## 2025-06-27 PROCEDURE — 99203 OFFICE O/P NEW LOW 30 MIN: CPT

## 2025-06-27 NOTE — TELEPHONE ENCOUNTER
Caller: Patient    Doctor/Office: Dr Scott    Call regarding :  received call  from office to come in earlier    Call was transferred to: Pod

## 2025-06-27 NOTE — PROGRESS NOTES
"Name: Miguelina Russo      : 1965      MRN: [id number]  Encounter Provider: Cal Scott DPM  Encounter Date: 2025   Encounter department: [facility]OD[facility] [place]  :  Assessment & Plan  Closed fracture of distal end of right fibula, unspecified fracture morphology, initial encounter    Orders:    Ambulatory Referral to Podiatry    XR ankle 3+ vw right; Future    Ankle fracture, right, closed, with delayed healing, subsequent encounter         -Patient was educated regarding their condition.  -Rest, ice, compression, elevation  -We discussed the relationship between cigarette smoking, atherosclerotic disease, and its effects on the lower extremity. I emphasized the importance of smoking cessation.  -RTC 2 months for new right ankle x-rays    -X-ray of right ankle from 2025 reviewed: Radiographic evidence of fracture healing at distal fibula    History of Present Illness   HPI  Miguelina Russo is a 60 y.o. female who presents for evaluation of right ankle fracture with delayed healing.  Patient was following with sports orthopedics who is managing distal fibular right ankle fracture.  Patient reports she still has some pain to the outside of her right ankle but has gotten significantly better.  She was referred here because fracture was delayed healing.  Patient continues to smoke cigarettes daily.  She does take vitamin D.    Date of injury 3/1/2025    History obtained from: patient    Review of Systems  Medications Ordered Prior to Encounter[1]      Objective   Ht 5' 4\" (1.626 m)   Wt 78.9 kg (174 lb)   BMI 29.87 kg/m²      Physical Exam    Vascular:  -DP and PT pulses intact b/l  -Capillary refill time <2 sec b/l  -Skin temp: WNL    MSK:  -mild Pain on palpation distal lateral malleolus   -No gross deformities noted   -MMT 5/5 in all muscle compartments of the lower extremity  -Lesser digit movement intact    Neuro:  -Light sensation intact bilaterally  -Protective sensation intact " bilaterally with 10/10 points felt with [place] Radha monofilament    Derm:  -No lesions, abrasions, or open wounds noted  -ecchymosis not present  -Edema not present  -No fracture blisters noted          [1]   Current Outpatient Medications on File Prior to Visit   Medication Sig Dispense Refill    albuterol (PROVENTIL HFA,VENTOLIN HFA) 90 mcg/act inhaler Inhale 2 puffs every 4 (four) hours as needed for wheezing 6.7 g 0    ARIPiprazole (ABILIFY) 10 mg tablet Take 1 tablet (10 mg total) by mouth daily at bedtime 90 tablet 1    buPROPion (Wellbutrin XL) 300 mg 24 hr tablet Take 1 tablet (300 mg total) by mouth every morning 90 tablet 1    Calcium Carbonate (CALTRATE 600 PO) Take by mouth in the morning      gabapentin (NEURONTIN) 400 mg capsule Take 2 capsules (800 mg total) by mouth 3 (three) times a day 540 capsule 1    hydrOXYzine HCL (ATARAX) 50 mg tablet TAKE ONE-HALF TO ONE TABLET BY MOUTH THREE TIMES A DAY AS NEEDED FOR ANXIETY 180 tablet 1    lamoTRIgine (LaMICtal) 25 mg tablet TAKE 2 PO QAM AND TAKE EIGHT TABLETS BY MOUTH AT BEDTIME 900 tablet 1    omeprazole (PriLOSEC) 40 MG capsule Take 1 capsule (40 mg total) by mouth daily before breakfast 90 capsule 1    phentermine (ADIPEX-P) 37.5 MG tablet TAKE ONE TABLET BY MOUTH EVERY MORNING 30 tablet 0    traZODone (DESYREL) 50 mg tablet TAKE THREE TABLETS BY MOUTH DAILY AT BEDTIME AS NEEDED 270 tablet 0     No current facility-administered medications on file prior to visit.

## 2025-07-02 ENCOUNTER — RA CDI HCC (OUTPATIENT)
Dept: OTHER | Facility: HOSPITAL | Age: 60
End: 2025-07-02

## 2025-07-07 ENCOUNTER — TELEPHONE (OUTPATIENT)
Dept: PAIN MEDICINE | Facility: CLINIC | Age: 60
End: 2025-07-07

## 2025-07-07 NOTE — TELEPHONE ENCOUNTER
Caller: Patient    Doctor: Dr. Quiroz    Reason for call: Pt reports  more than 75% improvement post injection  Pain level  2/10       Call back#:

## 2025-07-07 NOTE — TELEPHONE ENCOUNTER
Patient called asking to speak to [facility].  Warm transfer to [facility].

## 2025-07-11 ENCOUNTER — OFFICE VISIT (OUTPATIENT)
Dept: FAMILY MEDICINE CLINIC | Facility: CLINIC | Age: 60
End: 2025-07-11
Payer: COMMERCIAL

## 2025-07-11 ENCOUNTER — TELEPHONE (OUTPATIENT)
Dept: FAMILY MEDICINE CLINIC | Facility: CLINIC | Age: 60
End: 2025-07-11

## 2025-07-11 VITALS
HEIGHT: 64 IN | SYSTOLIC BLOOD PRESSURE: 116 MMHG | HEART RATE: 75 BPM | RESPIRATION RATE: 16 BRPM | OXYGEN SATURATION: 98 % | BODY MASS INDEX: 30.52 KG/M2 | TEMPERATURE: 97.5 F | WEIGHT: 178.8 LBS | DIASTOLIC BLOOD PRESSURE: 72 MMHG

## 2025-07-11 DIAGNOSIS — F33.2 MDD (MAJOR DEPRESSIVE DISORDER), RECURRENT SEVERE, WITHOUT PSYCHOSIS (HCC): ICD-10-CM

## 2025-07-11 DIAGNOSIS — E66.9 OBESITY (BMI 30-39.9): ICD-10-CM

## 2025-07-11 DIAGNOSIS — J20.8 ACUTE BRONCHITIS DUE TO OTHER SPECIFIED ORGANISMS: ICD-10-CM

## 2025-07-11 DIAGNOSIS — E78.49 OTHER HYPERLIPIDEMIA: Primary | ICD-10-CM

## 2025-07-11 DIAGNOSIS — R73.9 HYPERGLYCEMIA: ICD-10-CM

## 2025-07-11 DIAGNOSIS — F31.9 BIPOLAR 1 DISORDER (HCC): ICD-10-CM

## 2025-07-11 DIAGNOSIS — Z00.00 MEDICARE ANNUAL WELLNESS VISIT, SUBSEQUENT: ICD-10-CM

## 2025-07-11 DIAGNOSIS — K21.9 GASTROESOPHAGEAL REFLUX DISEASE WITHOUT ESOPHAGITIS: ICD-10-CM

## 2025-07-11 DIAGNOSIS — N18.31 STAGE 3A CHRONIC KIDNEY DISEASE (HCC): ICD-10-CM

## 2025-07-11 PROBLEM — S82.831D CLOSED FRACTURE OF DISTAL END OF RIGHT FIBULA WITH ROUTINE HEALING: Status: RESOLVED | Noted: 2025-04-11 | Resolved: 2025-07-11

## 2025-07-11 PROBLEM — S82.831A CLOSED FRACTURE OF DISTAL END OF RIGHT FIBULA, UNSPECIFIED FRACTURE MORPHOLOGY, INITIAL ENCOUNTER: Status: RESOLVED | Noted: 2025-03-11 | Resolved: 2025-07-11

## 2025-07-11 PROBLEM — L03.115 CELLULITIS OF RIGHT LOWER EXTREMITY: Status: RESOLVED | Noted: 2018-09-26 | Resolved: 2025-07-11

## 2025-07-11 PROCEDURE — G0439 PPPS, SUBSEQ VISIT: HCPCS | Performed by: FAMILY MEDICINE

## 2025-07-11 PROCEDURE — 99214 OFFICE O/P EST MOD 30 MIN: CPT | Performed by: FAMILY MEDICINE

## 2025-07-11 PROCEDURE — G2211 COMPLEX E/M VISIT ADD ON: HCPCS | Performed by: FAMILY MEDICINE

## 2025-07-11 RX ORDER — ALBUTEROL SULFATE 90 UG/1
2 INHALANT RESPIRATORY (INHALATION) EVERY 4 HOURS PRN
Qty: 6.7 G | Refills: 0 | Status: SHIPPED | OUTPATIENT
Start: 2025-07-11

## 2025-07-11 RX ORDER — PHENTERMINE HYDROCHLORIDE 37.5 MG/1
37.5 TABLET ORAL DAILY
Qty: 90 TABLET | Refills: 0 | Status: SHIPPED | OUTPATIENT
Start: 2025-07-11

## 2025-07-11 NOTE — ASSESSMENT & PLAN NOTE
Lab Results   Component Value Date    EGFR 54 06/13/2025    EGFR 57 12/13/2024    EGFR 72 03/09/2024    CREATININE 1.09 06/13/2025    CREATININE 1.05 12/13/2024    CREATININE 0.87 03/09/2024   GFR is slightly low.  Discussed about increase oral hydration.  Continue to monitor.

## 2025-07-11 NOTE — TELEPHONE ENCOUNTER
PA for phentermine (ADIPEX-P) 37.5 MG tablet SUBMITTED to     Requested PA through the above benefits, came back automatically Denied. Not covered under patients plan

## 2025-07-11 NOTE — ASSESSMENT & PLAN NOTE
Not improving.  Continue on phentermine.  Discussed about low-carb diet and regular exercise.  Orders:  •  phentermine (ADIPEX-P) 37.5 MG tablet; Take 1 tablet (37.5 mg total) by mouth daily

## 2025-07-11 NOTE — PROGRESS NOTES
Name: Miguelina Russo      : 1965      MRN: [id number]  Encounter Provider: Mike Leblanc MD  Encounter Date: 2025   [facility]  :  Assessment & Plan  Other hyperlipidemia  Currently well-controlled without medication.   Continue the same.   Discussed low-fat diet and regular exercise.   Will continue to monitor with fasting lipid panel.   Orders:  •  CBC and differential; Future  •  Comprehensive metabolic panel; Future  •  Lipid Panel with Direct LDL reflex; Future  •  TSH, 3rd generation with Free T4 reflex; Future    Hyperglycemia  Currently stable.   Discussed low-carb diet and regular exercise.  Will continue to monitor with fasting glucose and A1c.   Orders:  •  Hemoglobin A1C; Future    Obesity (BMI 30-39.9)  Not improving.  Continue on phentermine.  Discussed about low-carb diet and regular exercise.  Orders:  •  phentermine (ADIPEX-P) 37.5 MG tablet; Take 1 tablet (37.5 mg total) by mouth daily    Acute bronchitis due to other specified organisms    Orders:  •  albuterol (PROVENTIL HFA,VENTOLIN HFA) 90 mcg/act inhaler; Inhale 2 puffs every 4 (four) hours as needed for wheezing    Gastroesophageal reflux disease without esophagitis  Stable on Prilosec.  Continue same.  Will continue to monitor.       Stage 3a chronic kidney disease (HCC)  Lab Results   Component Value Date    EGFR 54 2025    EGFR 57 2024    EGFR 72 2024    CREATININE 1.09 2025    CREATININE 1.05 2024    CREATININE 0.87 2024   GFR is slightly low.  Discussed about increase oral hydration.  Continue to monitor.       MDD (major depressive disorder), recurrent severe, without psychosis (HCC)  Currently stable on lamictal, abilify, cymbalta, and trazadone.   Continue following with psychiatrist       Bipolar 1 disorder (HCC)  Currently stable on Lamictal, Abilify, Cymbalta, and Trazodone.  Continue following with psychiatrist.        Medicare  annual wellness visit, subsequent  It was discussed about immunizations, diet, exercise and safety measures.          Preventive health issues were discussed with patient, and age appropriate screening tests were ordered as noted in patient's After Visit Summary. Personalized health advice and appropriate referrals for health education or preventive services given if needed, as noted in patient's After Visit Summary.    History of Present Illness     She is here today for follow-up multiple medical problems.  She has been taking her medications.  She denies any side effect her medication.  She had blood work done recently showed slightly decreased GFR otherwise blood work came back stable.  She has been following with pain management for her back pain.  She has been taking phentermine to help her weight loss.  She did not lose any weight since her last visit.  She denies any side effect from medication.  She states she has not been exercising due to her back pain.       Patient Care Team:  Mike Leblanc MD as PCP - General (Family Medicine)  Ty Nunez MD (Oncology)    Review of Systems   Constitutional:  Negative for chills and fever.   HENT:  Negative for trouble swallowing.    Eyes:  Negative for visual disturbance.   Respiratory:  Negative for cough and shortness of breath.    Cardiovascular:  Negative for chest pain, palpitations and leg swelling.   Gastrointestinal:  Negative for abdominal pain, constipation and diarrhea.   Endocrine: Negative for cold intolerance and heat intolerance.   Genitourinary:  Negative for difficulty urinating and dysuria.   Musculoskeletal:  Positive for back pain. Negative for gait problem.   Skin:  Negative for rash.   Neurological:  Negative for dizziness, tremors, seizures and headaches.   Hematological:  Negative for adenopathy.   Psychiatric/Behavioral:  Negative for behavioral problems.      Medical History Reviewed by provider this encounter:  Tobacco  Allergies   Meds  Problems  Med Hx  Surg Hx  Fam Hx       Annual Wellness Visit Questionnaire   Miguelina is here for her Subsequent Wellness visit.     Health Risk Assessment:   Patient rates overall health as good. Patient feels that their physical health rating is same. Patient is very satisfied with their life. Eyesight was rated as slightly worse. Hearing was rated as same. Patient feels that their emotional and mental health rating is same. Patients states they are never, rarely angry. Patient states they are sometimes unusually tired/fatigued. Pain experienced in the last 7 days has been some. Patient's pain rating has been 2/10. Patient states that she has experienced no weight loss or gain in last 6 months.     Fall Risk Screening:   In the past year, patient has experienced: no history of falling in past year      Urinary Incontinence Screening:   Patient has not leaked urine accidently in the last six months.     Home Safety:  Patient does not have trouble with stairs inside or outside of their home. Patient has working smoke alarms and has working carbon monoxide detector. Home safety hazards include: loose rugs on the floor.     Nutrition:   Current diet is Regular and Limited junk food.     Medications:   Patient is currently taking over-the-counter supplements. OTC medications include: see medication list. Patient is able to manage medications.     Activities of Daily Living (ADLs)/Instrumental Activities of Daily Living (IADLs):   Walk and transfer into and out of bed and chair?: Yes  Dress and groom yourself?: Yes    Bathe or shower yourself?: Yes    Feed yourself? Yes  Do your laundry/housekeeping?: Yes  Manage your money, pay your bills and track your expenses?: Yes  Make your own meals?: Yes    Do your own shopping?: Yes    Previous Hospitalizations:   Any hospitalizations or ED visits within the last 12 months?: Yes    How many hospitalizations have you had in the last year?: 1-2    Hospitalization  Comments: Broke an ankle    Advance Care Planning:   Living will: No    Durable POA for healthcare: No    Advanced directive: No      Cognitive Screening:   Provider or family/friend/caregiver concerned regarding cognition?: No    Preventive Screenings      Cardiovascular Screening:    General: Screening Not Indicated and History Lipid Disorder      Diabetes Screening:     General: Screening Current      Colorectal Cancer Screening:     General: Screening Current      Breast Cancer Screening:     General: Screening Current      Cervical Cancer Screening:    General: Risks and Benefits Discussed      Osteoporosis Screening:    General: Risks and Benefits Discussed    Due for: DXA Axial      Abdominal Aortic Aneurysm (AAA) Screening:        General: Risks and Benefits Discussed      Lung Cancer Screening:     General: Risks and Benefits Discussed      Hepatitis C Screening:    General: Risks and Benefits Discussed    Immunizations:  - Immunizations due: Zoster (Shingrix)    Screening, Brief Intervention, and Referral to Treatment (SBIRT)     Screening  Typical number of drinks in a day: 0  Typical number of drinks in a week: 0  Interpretation: Low risk drinking behavior.    AUDIT-C Screenin) How often did you have a drink containing alcohol in the past year? never  2) How many drinks did you have on a typical day when you were drinking in the past year? 0  3) How often did you have 6 or more drinks on one occasion in the past year? never    AUDIT-C Score: 0  Interpretation: Score 0-2 (female): Negative screen for alcohol misuse    Single Item Drug Screening:  How often have you used an illegal drug (including marijuana) or a prescription medication for non-medical reasons in the past year? never    Single Item Drug Screen Score: 0  Interpretation: Negative screen for possible drug use disorder    Brief Intervention  Alcohol & drug use screenings were reviewed. No concerns regarding substance use disorder  "identified.     Other Counseling Topics:   Calcium and vitamin D intake and regular weightbearing exercise.     Social Drivers of Health     Food Insecurity: Food Insecurity Present (7/7/2025)    Nursing - Inadequate Food Risk Classification    • Worried About Running Out of Food in the Last Year: Never true    • Ran Out of Food in the Last Year: Sometimes true   Transportation Needs: No Transportation Needs (7/7/2025)    PRAPARE - Transportation    • Lack of Transportation (Medical): No    • Lack of Transportation (Non-Medical): No   Housing Stability: Unknown (7/7/2025)    Housing Stability Vital Sign    • Unable to Pay for Housing in the Last Year: No    • Homeless in the Last Year: No   Utilities: At Risk (7/7/2025)    [facility] Utilities    • Threatened with loss of utilities: Yes     No results found.    Objective   /72 (BP Location: Left arm, Patient Position: Sitting, Cuff Size: Large)   Pulse 75   Temp 97.5 °F (36.4 °C) (Tympanic)   Resp 16   Ht 5' 4\" (1.626 m)   Wt 81.1 kg (178 lb 12.8 oz)   SpO2 98%   BMI 30.69 kg/m²     Physical Exam  Vitals and nursing note reviewed.   Constitutional:       Appearance: She is well-developed.   HENT:      Head: Normocephalic and atraumatic.     Eyes:      Pupils: Pupils are equal, round, and reactive to light.       Cardiovascular:      Rate and Rhythm: Normal rate and regular rhythm.      Heart sounds: Normal heart sounds.   Pulmonary:      Effort: Pulmonary effort is normal.      Breath sounds: Normal breath sounds.   Abdominal:      General: Bowel sounds are normal.      Palpations: Abdomen is soft.     Musculoskeletal:      Cervical back: Normal range of motion and neck supple.   Lymphadenopathy:      Cervical: No cervical adenopathy.     Skin:     General: Skin is warm.     Neurological:      Mental Status: She is alert and oriented to person, place, and time.         "

## 2025-07-13 ENCOUNTER — PATIENT MESSAGE (OUTPATIENT)
Dept: FAMILY MEDICINE CLINIC | Facility: CLINIC | Age: 60
End: 2025-07-13

## 2025-07-25 ENCOUNTER — OFFICE VISIT (OUTPATIENT)
Dept: PAIN MEDICINE | Facility: CLINIC | Age: 60
End: 2025-07-25
Payer: COMMERCIAL

## 2025-07-25 VITALS — BODY MASS INDEX: 30.56 KG/M2 | HEIGHT: 64 IN | WEIGHT: 179 LBS

## 2025-07-25 DIAGNOSIS — M54.16 LUMBAR RADICULOPATHY: Primary | ICD-10-CM

## 2025-07-25 DIAGNOSIS — M48.061 SPINAL STENOSIS OF LUMBAR REGION, UNSPECIFIED WHETHER NEUROGENIC CLAUDICATION PRESENT: ICD-10-CM

## 2025-07-25 PROCEDURE — 99214 OFFICE O/P EST MOD 30 MIN: CPT | Performed by: ANESTHESIOLOGY

## 2025-07-25 PROCEDURE — G2211 COMPLEX E/M VISIT ADD ON: HCPCS | Performed by: ANESTHESIOLOGY

## 2025-07-25 NOTE — PROGRESS NOTES
Name: Miguelina Russo      : 1965      MRN: [id number]  Encounter Provider: Christopher Quiroz MD  Encounter Date: 2025   Encounter department:  [facility] [place]  :  Assessment & Plan  Lumbar radiculopathy    Orders:    FL spine and pain procedure; Future    Spinal stenosis of lumbar region, unspecified whether neurogenic claudication present           Pain is consistent with lumbar radicular pain, lumbar spinal stenosis, lumbar spondylosis accompanied by consistent moderate pain on the pain scale (3-4+/10) with inability to participate in IADLs for >6 weeks. Patient has participated with physical therapy as well as home exercises and stretches.  Patient has tried Voltaren gel, ibuprofen, Robaxin, gabapentin, oral steroids with modest benefit. Denies any bowel or bladder incontinence, saddle anesthesia.       Independently reviewed and interpreted prior lumbar MRI - this shows moderate spinal stenosis at L2-3 and moderate to severe spinal stenosis at L3-4. Spinal fusions noted at L1-2 and L4-5.     Given that patient has not had any benefit with conservative treatments, I think patient would benefit from targeted interventional treatment modalities.     Patient notes no significant change in symptoms after bilateral L3 transforaminal epidural steroid injections.  She continues to have symptoms consistent with lumbar radiculopathy and spinal stenosis.    - Discussed and offered repeat DANIELLE with L3-4 interlaminar ADNIELLE approach.  The procedures, its risks, and benefits were explained in detail to the patient. Risks include but are not limited to bleeding, infection, hematoma formation, abscess formation, weakness, headache, failure the pain to improve, nerve irritation or damage, and potential worsening of the pain.  The approach was demonstrated using models and literature was provided. The patient verbalized understanding and wished to proceed with the procedure.      Will follow up 1  "month after LESI.     Reviewed hemoglobin A1c, renal function, CBC and/or PT/INR prior to discussing/offering interventional modalities.    My impressions and treatment recommendations were discussed in detail with the patient who verbalized understanding and had no further questions.  Discharge instructions were provided. I personally saw and examined the patient and I agree with the above discussed plan of care.    History of Present Illness     Miguelina Russo is a 60 y.o. female who presents to [facility] for interval re-evaluation in regards to pain in the Low back. The patients last office visit was 6/23/25. Patient presents today with pain in low back that radiates to the buttocks and left thigh. Pain is described as Constant and Pins & Needles. Symptoms are worse all day. Alleviating factors identifiable by the patient are lying down. On the numeric pain scale of 1-10, the pain typically increases to max of 2 out of 10, which is currently impacting their quality of life and interferes with their activities of daily living.     Review of Systems   Respiratory:  Negative for shortness of breath.    Cardiovascular:  Negative for chest pain.   Gastrointestinal:  Negative for constipation, diarrhea, nausea and vomiting.   Musculoskeletal:  Positive for back pain and myalgias. Negative for arthralgias, gait problem and joint swelling.   Skin:  Negative for rash.   Neurological:  Negative for dizziness, seizures and weakness.   All other systems reviewed and are negative.      Medical History Reviewed by provider this encounter:     .  Medications Ordered Prior to Encounter[1]      Objective   Ht 5' 4\" (1.626 m)   Wt 81.2 kg (179 lb)   BMI 30.73 kg/m²      Pain Score:   2  Physical Exam  Constitutional: normal, well developed, well nourished, alert, in no distress and non-toxic and no overt pain behavior.  Eyes: anicteric  HEENT: grossly intact  Neck: supple, symmetric, trachea midline " and no masses   Pulmonary: even and unlabored  Cardiovascular: No edema or pitting edema present  Skin: Normal without rashes or lesions and well hydrated  Psychiatric: Mood and affect appropriate  Neurologic:  Motor function is grossly intact with no focal neurologic deficits   Musculoskeletal: limited lumbar spine ROM.    Radiology Results Review: I personally reviewed the following image studies in PACS and associated radiology reports: MRI spine. My interpretation of the radiology images/reports is: Moderate to severe canal stenosis at L2-3 and L4-5. Prior lumbar spinal fusion hardware at L1-2 and L4-5.         [1]   Current Outpatient Medications on File Prior to Visit   Medication Sig Dispense Refill    albuterol (PROVENTIL HFA,VENTOLIN HFA) 90 mcg/act inhaler Inhale 2 puffs every 4 (four) hours as needed for wheezing 6.7 g 0    ARIPiprazole (ABILIFY) 10 mg tablet Take 1 tablet (10 mg total) by mouth daily at bedtime 90 tablet 1    buPROPion (Wellbutrin XL) 300 mg 24 hr tablet Take 1 tablet (300 mg total) by mouth every morning 90 tablet 1    Calcium Carbonate (CALTRATE 600 PO) Take by mouth in the morning      gabapentin (NEURONTIN) 400 mg capsule Take 2 capsules (800 mg total) by mouth 3 (three) times a day 540 capsule 1    hydrOXYzine HCL (ATARAX) 50 mg tablet TAKE ONE-HALF TO ONE TABLET BY MOUTH THREE TIMES A DAY AS NEEDED FOR ANXIETY 180 tablet 1    lamoTRIgine (LaMICtal) 25 mg tablet TAKE 2 PO QAM AND TAKE EIGHT TABLETS BY MOUTH AT BEDTIME 900 tablet 1    omeprazole (PriLOSEC) 40 MG capsule Take 1 capsule (40 mg total) by mouth daily before breakfast 90 capsule 1    phentermine (ADIPEX-P) 37.5 MG tablet Take 1 tablet (37.5 mg total) by mouth daily 90 tablet 0    traZODone (DESYREL) 50 mg tablet TAKE THREE TABLETS BY MOUTH DAILY AT BEDTIME AS NEEDED 270 tablet 0     No current facility-administered medications on file prior to visit.

## 2025-07-31 ENCOUNTER — HOSPITAL ENCOUNTER (OUTPATIENT)
Dept: RADIOLOGY | Facility: MEDICAL CENTER | Age: 60
Discharge: HOME | End: 2025-07-31
Attending: ANESTHESIOLOGY
Payer: COMMERCIAL

## 2025-07-31 VITALS
SYSTOLIC BLOOD PRESSURE: 113 MMHG | DIASTOLIC BLOOD PRESSURE: 83 MMHG | TEMPERATURE: 96.7 F | HEART RATE: 81 BPM | OXYGEN SATURATION: 95 % | RESPIRATION RATE: 18 BRPM

## 2025-07-31 DIAGNOSIS — M54.16 LUMBAR RADICULOPATHY: ICD-10-CM

## 2025-07-31 PROCEDURE — 62323 NJX INTERLAMINAR LMBR/SAC: CPT | Performed by: ANESTHESIOLOGY

## 2025-07-31 RX ORDER — METHYLPREDNISOLONE ACETATE 80 MG/ML
80 INJECTION, SUSPENSION INTRA-ARTICULAR; INTRALESIONAL; INTRAMUSCULAR; PARENTERAL; SOFT TISSUE ONCE
Status: COMPLETED | OUTPATIENT
Start: 2025-07-31 | End: 2025-07-31

## 2025-07-31 RX ORDER — BUPIVACAINE HCL/PF 2.5 MG/ML
1 VIAL (ML) INJECTION ONCE
Status: COMPLETED | OUTPATIENT
Start: 2025-07-31 | End: 2025-07-31

## 2025-07-31 RX ADMIN — IOHEXOL 1 ML: 300 INJECTION, SOLUTION INTRAVENOUS at 14:57

## 2025-07-31 RX ADMIN — BUPIVACAINE HYDROCHLORIDE 1 ML: 2.5 INJECTION, SOLUTION EPIDURAL; INFILTRATION; INTRACAUDAL at 14:57

## 2025-07-31 RX ADMIN — METHYLPREDNISOLONE ACETATE 80 MG: 80 INJECTION, SUSPENSION INTRA-ARTICULAR; INTRALESIONAL; INTRAMUSCULAR; SOFT TISSUE at 14:57

## 2025-08-10 PROBLEM — Z00.00 MEDICARE ANNUAL WELLNESS VISIT, SUBSEQUENT: Status: RESOLVED | Noted: 2023-06-11 | Resolved: 2025-08-10

## 2025-08-14 ENCOUNTER — TELEPHONE (OUTPATIENT)
Dept: PAIN MEDICINE | Facility: CLINIC | Age: 60
End: 2025-08-14

## (undated) DEVICE — SUT MONOCRYL 4-0 PS-2 18 IN Y496G

## (undated) DEVICE — CHLORAPREP HI-LITE 26ML ORANGE

## (undated) DEVICE — INTENDED FOR TISSUE SEPARATION, AND OTHER PROCEDURES THAT REQUIRE A SHARP SURGICAL BLADE TO PUNCTURE OR CUT.: Brand: BARD-PARKER SAFETY BLADES SIZE 15, STERILE

## (undated) DEVICE — URIMETER 2500ML

## (undated) DEVICE — NEEDLE 25G X 1 1/2

## (undated) DEVICE — STRL UNIVERSAL MINOR VAGINAL: Brand: CARDINAL HEALTH

## (undated) DEVICE — GLOVE SRG BIOGEL 6.5

## (undated) DEVICE — DISPOSABLE EQUIPMENT COVER: Brand: SMALL TOWEL DRAPE

## (undated) DEVICE — ADHESIVE SKN CLSR HISTOACRYL FLEX 0.5ML LF

## (undated) DEVICE — SKIN MARKER DUAL TIP WITH RULER CAP, FLEXIBLE RULER AND LABELS: Brand: DEVON

## (undated) DEVICE — CATH FOLEY 18FR 5ML 2 WAY SILICONE ELASTIMER

## (undated) DEVICE — GLOVE SRG BIOGEL ECLIPSE 7.5

## (undated) DEVICE — PLUMEPEN PRO 10FT

## (undated) DEVICE — 1820 FOAM BLOCK NEEDLE COUNTER: Brand: DEVON

## (undated) DEVICE — COLUMN DRAPE

## (undated) DEVICE — SUT MONOCRYL 4-0 PS-2 27 IN Y426H

## (undated) DEVICE — PROGRASP FORCEPS: Brand: ENDOWRIST

## (undated) DEVICE — MEDI-VAC YANK SUCT HNDL W/TPRD BULBOUS TIP: Brand: CARDINAL HEALTH

## (undated) DEVICE — CANNULA SEAL

## (undated) DEVICE — TELFA NON-ADHERENT ABSORBENT DRESSING: Brand: TELFA

## (undated) DEVICE — HEM-O-LOK CLIP CARTRIDGE MED/LARGE DA VINCI SI/XI

## (undated) DEVICE — ALLENTOWN LAP CHOLE APP PACK: Brand: CARDINAL HEALTH

## (undated) DEVICE — EXOFIN PRECISION PEN HIGH VISCOSITY TOPICAL SKIN ADHESIVE: Brand: EXOFIN PRECISION PEN, 1G

## (undated) DEVICE — SYRINGE 20ML LL

## (undated) DEVICE — DRAPE EQUIPMENT RF WAND

## (undated) DEVICE — SUT SILK 0 SH 30 IN K834H

## (undated) DEVICE — BLADELESS OBTURATOR: Brand: WECK VISTA

## (undated) DEVICE — SUT SILK 2-0 SH 30 IN K833H

## (undated) DEVICE — MEDIUM-LARGE CLIP APPLIER: Brand: ENDOWRIST

## (undated) DEVICE — MAYO STAND COVER: Brand: CONVERTORS

## (undated) DEVICE — CURITY PLAIN PACKING STRIP: Brand: CURITY

## (undated) DEVICE — DISPOSABLE OR TOWEL: Brand: CARDINAL HEALTH

## (undated) DEVICE — DRAPE TOWEL: Brand: CONVERTORS

## (undated) DEVICE — 3000CC GUARDIAN II: Brand: GUARDIAN

## (undated) DEVICE — TROCAR: Brand: KII FIOS FIRST ENTRY

## (undated) DEVICE — SCD SEQUENTIAL COMPRESSION COMFORT SLEEVE MEDIUM KNEE LENGTH: Brand: KENDALL SCD

## (undated) DEVICE — 3M™ STERI-STRIP™ REINFORCED ADHESIVE SKIN CLOSURES, R1547, 1/2 IN X 4 IN (12 MM X 100 MM), 6 STRIPS/ENVELOPE: Brand: 3M™ STERI-STRIP™

## (undated) DEVICE — TISSUE RETRIEVAL SYSTEM: Brand: INZII RETRIEVAL SYSTEM

## (undated) DEVICE — SUT VICRYL 2-0 SH 27 IN UNDYED J417H

## (undated) DEVICE — INTENDED FOR TISSUE SEPARATION, AND OTHER PROCEDURES THAT REQUIRE A SHARP SURGICAL BLADE TO PUNCTURE OR CUT.: Brand: BARD-PARKER SAFETY BLADES SIZE 11, STERILE

## (undated) DEVICE — GLOVE INDICATOR PI UNDERGLOVE SZ 8 BLUE

## (undated) DEVICE — PACKING VAGINAL 2 IN

## (undated) DEVICE — METZENBAUM ADTEC SINGLE USE DISSECTING SCISSORS, SHAFT ONLY, MONOPOLAR, CURVED TO LEFT, WORKING LENGTH: 12 1/4", (310 MM), DIAM. 5 MM, INSULATED, DOUBLE ACTION, STERILE, DISPOSABLE, PACKAGE OF 10 PIECES: Brand: AESCULAP

## (undated) DEVICE — SPECIMEN CONTAINER STERILE PEEL PACK

## (undated) DEVICE — GLOVE INDICATOR PI UNDERGLOVE SZ 6.5 BLUE

## (undated) DEVICE — SYRINGE CATH TIP 50ML

## (undated) DEVICE — PERMANENT CAUTERY HOOK: Brand: ENDOWRIST

## (undated) DEVICE — TUBING SUCTION 5MM X 12 FT

## (undated) DEVICE — ARM DRAPE

## (undated) DEVICE — REM POLYHESIVE ADULT PATIENT RETURN ELECTRODE: Brand: VALLEYLAB

## (undated) DEVICE — NEEDLE HYPO 23G X 1-1/2 IN

## (undated) DEVICE — SYRINGE 10ML LL